# Patient Record
Sex: FEMALE | Race: WHITE | Employment: FULL TIME | URBAN - METROPOLITAN AREA
[De-identification: names, ages, dates, MRNs, and addresses within clinical notes are randomized per-mention and may not be internally consistent; named-entity substitution may affect disease eponyms.]

---

## 2017-01-04 ENCOUNTER — TELEPHONE (OUTPATIENT)
Dept: NURSING | Facility: CLINIC | Age: 27
End: 2017-01-04

## 2017-01-05 NOTE — TELEPHONE ENCOUNTER
Call Type: Triage Call    Presenting Problem: 34 and half weeks.  EDC 2-12-17 .  Lightheaded  and dizzy all the time and doesn't feel good. Has only had about 6  ounces of fluids today and a little soup to eat.  Care advice for  cold management and hydration provided.  Encouraged her to take her  temp and to call back if greater than 100.  Triage Note:  Guideline Title: Upper Respiratory Infection (URI)  Recommended Disposition: Provide Home/Self Care  Original Inclination:  Override Disposition:  Intended Action:  Physician Contacted: No  Pressure/pain above or below eyes, near ears over sinuses (may also be described  as fullness, worsens when bending forward, teeth or eye pain) ?  YES  Ear congestion, pressure, fullness ? NO  Severe breathing problems ? NO  Breathing symptoms (wheezing, shortness of breath, changes in rates of breathing,  skin color changes) main problem ? NO  Sore throat is the symptom causing most concern ? NO  Cough is the symptom causing most concern ? NO  New onset of eye redness, irritation/foreign body sensation or gritty feeling with  yellow/green drainage ? NO  Temperature of 101.5 F (38.6 C) or greater that has not responded to 24 hours of  home care measures ? NO  Localized tender, swollen glands (lymph nodes) that persist or are unimproved  after 14 days ? NO  Symptoms worsen after 7 days or symptoms do not improve after 14 days of home care  ? NO  Recent or recurrent episodes of sneezing, nasal congestion; watery nasal drainage;  scratchy/itchy throat; red/itchy/watery eyes or cough unrelieved after one week  of home care measures ? NO  Sudden onset of flu-like symptoms ? NO  Temperature up to 101.5 F (38.6C) and has not started any home care OR home care  for less than 24 hours ? NO  Pregnant and has a temperature up to 100 F (37.7 C) that has not responded to 3  days of home care ? NO  Being treated by a provider for a secondary infection AND no improvement in  symptoms, symptoms  have worsened OR has new symptoms after following treatment  plan for the time specified by provider. ? NO  Previous call within last week for similar symptoms AND has followed recommended  self care measures for a week but symptoms have not improved or symptoms have  worsened. ? NO  Mild to moderate headache for more than 24 hours unrelieved with 8 hours of  nonprescription medications ? NO  Generalized mild frontal headache unresponsive to 24 hours of home care measures ?  NO  Severe headache unrelieved by 8 hours of nonprescription medication ? NO  New productive cough with thick colored sputum (other than clear or white sputum;  not postnasal drainage) ? NO  Pregnant and has temperature 100F (37.7 C) or greater ? NO  Any temperature elevation in an immunocompromised individual OR frail elderly with  signs of dehydration ? NO  Physician Instructions:  Care Advice:

## 2017-01-09 ENCOUNTER — PRENATAL OFFICE VISIT (OUTPATIENT)
Dept: OBGYN | Facility: CLINIC | Age: 27
End: 2017-01-09
Payer: COMMERCIAL

## 2017-01-09 VITALS
HEIGHT: 62 IN | HEART RATE: 113 BPM | WEIGHT: 155.4 LBS | SYSTOLIC BLOOD PRESSURE: 127 MMHG | BODY MASS INDEX: 28.6 KG/M2 | DIASTOLIC BLOOD PRESSURE: 73 MMHG

## 2017-01-09 DIAGNOSIS — Z34.83 PRENATAL CARE, SUBSEQUENT PREGNANCY, THIRD TRIMESTER: Primary | ICD-10-CM

## 2017-01-09 PROCEDURE — 99207 ZZC PRENATAL VISIT: CPT | Performed by: OBSTETRICS & GYNECOLOGY

## 2017-01-09 PROCEDURE — 87653 STREP B DNA AMP PROBE: CPT | Performed by: OBSTETRICS & GYNECOLOGY

## 2017-01-09 PROCEDURE — 99000 SPECIMEN HANDLING OFFICE-LAB: CPT | Performed by: OBSTETRICS & GYNECOLOGY

## 2017-01-09 RX ORDER — HYDROXYZINE PAMOATE 50 MG/1
50-100 CAPSULE ORAL
Qty: 60 CAPSULE | Refills: 0 | Status: SHIPPED | OUTPATIENT
Start: 2017-01-09 | End: 2017-03-23

## 2017-01-09 NOTE — PROGRESS NOTES
"Doing well.   C/o pelvic pressure, pelvic/hip discomfort. Interferes with her sleeping.   Desires elective induction of labor at 39 weeks  Denies VB, ctx, LOF. +FM  /73 mmHg  Pulse 113  Ht 5' 2\" (1.575 m)  Wt 155 lb 6.4 oz (70.489 kg)  BMI 28.42 kg/m2  LMP 2016 (Approximate)  General Appearance: NAD  Abdomen: Gravid, NT  Refer to flow sheet above.   A/P: 26 year old  at 35w1d  -- GBS collected  -- concerns addressed above; vistaril prescribed for difficulty sleeping from discomfort  -- Discussed with Laura Grewal, the following; indications; the agents and methods of labor augmentation, including risks, benefits, and alternative approaches; and the possible need for  birth. EFW is AGA. The Labor Induction:what you need to know information sheet was made available to her. Questions and concerns were addressed and patient agrees to above if necessary during the course of her labor.  -- labor precautions reviewed  RTC in 1 week    Pawan Crum MD  [facility]            "

## 2017-01-09 NOTE — NURSING NOTE
"Chief Complaint   Patient presents with     Prenatal Care       Initial /73 mmHg  Pulse 113  Ht 5' 2\" (1.575 m)  Wt 155 lb 6.4 oz (70.489 kg)  BMI 28.42 kg/m2  LMP 05/08/2016 (Approximate) Estimated body mass index is 28.42 kg/(m^2) as calculated from the following:    Height as of this encounter: 5' 2\" (1.575 m).    Weight as of this encounter: 155 lb 6.4 oz (70.489 kg).  BP completed using cuff size: ava Woodard LPN      "

## 2017-01-10 LAB
GP B STREP DNA SPEC QL NAA+PROBE: NORMAL
SPECIMEN SOURCE: NORMAL

## 2017-01-12 ENCOUNTER — TELEPHONE (OUTPATIENT)
Dept: OBGYN | Facility: CLINIC | Age: 27
End: 2017-01-12

## 2017-01-12 NOTE — TELEPHONE ENCOUNTER
"Returned call to Patient:  S-(situation): decreased fetal movement, vaginal fluids?    B-(background): Patient is 35+4 week pregnant    A-(assessment): Patient reports lower back, abdominal pain, and pelvic pain with movement. Patient states \"every time I get up to go to the bathroom my underwear/pants are wet.\" Patient reports decreased fetal movement, states has not felt baby move today and normally baby is moving around at 5-6 AM.    R-(recommendations): Advised Patient to be seen in Birth Center to rule out rupture of membranes. Patient reports she plans to eat breakfast first, recommended to have a large glass of juice with breakfast, and lie on left side, pay attention to fetal movement. Patient will follow up in Birth Center after she eats.  Thank you,  Ifeoma Hernandez  OB/GYN, Specialty Clinic RN      "

## 2017-01-12 NOTE — PROGRESS NOTES
Quick Note:    GBS negative status. Will review at next follow-up visit.     Pawan Crum MD  [facility]      ______

## 2017-01-12 NOTE — TELEPHONE ENCOUNTER
Reason for call:  Patient reporting a symptom    Symptom or request: 35 wks pregnant - requesting an u/s at next appt.  States baby just doesn't move much - pt wants to make sure everything is ok.  States she called the birth center also.  Wants to know if an u/s can be done at the next prenatal appt.  Wants a response before appt.    Duration (how long have symptoms been present):     Have you been treated for this before?     Additional comments:     Phone Number patient can be reached at:  Home number on file [phone] (home)    Best Time:  any    Can we leave a detailed message on this number:  YES    Call taken on 1/12/2017 at 8:12 AM by Violetta Arteaga

## 2017-01-16 ENCOUNTER — PRENATAL OFFICE VISIT (OUTPATIENT)
Dept: OBGYN | Facility: CLINIC | Age: 27
End: 2017-01-16
Payer: COMMERCIAL

## 2017-01-16 VITALS
BODY MASS INDEX: 29.19 KG/M2 | SYSTOLIC BLOOD PRESSURE: 120 MMHG | WEIGHT: 158.6 LBS | HEART RATE: 107 BPM | DIASTOLIC BLOOD PRESSURE: 76 MMHG | HEIGHT: 62 IN

## 2017-01-16 DIAGNOSIS — Z34.83 PRENATAL CARE, SUBSEQUENT PREGNANCY, THIRD TRIMESTER: Primary | ICD-10-CM

## 2017-01-16 PROCEDURE — 99207 ZZC PRENATAL VISIT: CPT | Performed by: OBSTETRICS & GYNECOLOGY

## 2017-01-16 NOTE — PROGRESS NOTES
"CC: Here for routine prenatal visit @ 36w1d   HPI: + FM, no ctx, no LOF, no VB.  Achy pelvis.     PE: /76 mmHg  Pulse 107  Ht 5' 2\" (1.575 m)  Wt 158 lb 9.6 oz (71.94 kg)  BMI 29.00 kg/m2  LMP 2016 (Approximate)   See OB flowsheet    GBS negative    A/P  @ 36w1d normal pregnancy    1. Routine prenatal care.  Discussed aches/pains of pregnancy.      RTC 1 week    Mago Chaidez M.D.    "

## 2017-01-16 NOTE — NURSING NOTE
"Chief Complaint   Patient presents with     Prenatal Care     pelvic pain, swelling/pain in hands       Initial /76 mmHg  Pulse 107  Ht 5' 2\" (1.575 m)  Wt 158 lb 9.6 oz (71.94 kg)  BMI 29.00 kg/m2  LMP 05/08/2016 (Approximate) Estimated body mass index is 29 kg/(m^2) as calculated from the following:    Height as of this encounter: 5' 2\" (1.575 m).    Weight as of this encounter: 158 lb 9.6 oz (71.94 kg).  BP completed using cuff size: ava Woodard LPN      "

## 2017-01-18 ENCOUNTER — APPOINTMENT (OUTPATIENT)
Dept: ULTRASOUND IMAGING | Facility: CLINIC | Age: 27
End: 2017-01-18
Attending: OBSTETRICS & GYNECOLOGY
Payer: COMMERCIAL

## 2017-01-18 PROBLEM — O36.8190 DECREASED FETAL MOVEMENT: Status: ACTIVE | Noted: 2017-01-18

## 2017-01-18 PROCEDURE — 76819 FETAL BIOPHYS PROFIL W/O NST: CPT

## 2017-01-19 ENCOUNTER — TELEPHONE (OUTPATIENT)
Dept: OBGYN | Facility: CLINIC | Age: 27
End: 2017-01-19

## 2017-01-19 NOTE — TELEPHONE ENCOUNTER
Patient is currently able to work.  Please inquire if she feels she needs restrictions.     Mago Chaidez M.D.

## 2017-01-19 NOTE — TELEPHONE ENCOUNTER
Reason for call:  Patient reporting a symptom    Symptom or request: Has paperwork - needs Dr. Chaidez to fill out asap.  States she can't work until it is filled out.  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  Pregnant - almost 37 wks.  States employer just gave her this form and told her she couldn't work until it is completed.  Will be bringing it in to Dr. Chaidez to complete.    Additional comments: Will forward to Dr. Chaidez to advise - can pt work?  Any restrictions?  Next clinic appt is scheduled 1-24-17.    Phone Number patient can be reached at:  Home number on file [phone] (home)    Best Time:  any    Can we leave a detailed message on this number:  NO    Call taken on 1/19/2017 at 11:29 AM by Violetta Arteaga

## 2017-01-23 NOTE — TELEPHONE ENCOUNTER
Pt has not brought forms in yet.  Pt has an appt tomorrow -will close encounter for now.  Pt can bring her request to her appt.    -Violetta Arteaga  Clinic Station [place]

## 2017-01-24 ENCOUNTER — PRENATAL OFFICE VISIT (OUTPATIENT)
Dept: OBGYN | Facility: CLINIC | Age: 27
End: 2017-01-24
Payer: COMMERCIAL

## 2017-01-24 VITALS
DIASTOLIC BLOOD PRESSURE: 79 MMHG | HEART RATE: 106 BPM | HEIGHT: 62 IN | SYSTOLIC BLOOD PRESSURE: 122 MMHG | BODY MASS INDEX: 29.77 KG/M2 | WEIGHT: 161.8 LBS

## 2017-01-24 DIAGNOSIS — Z34.83 PRENATAL CARE, SUBSEQUENT PREGNANCY, THIRD TRIMESTER: Primary | ICD-10-CM

## 2017-01-24 PROBLEM — O36.8190 DECREASED FETAL MOVEMENT: Status: RESOLVED | Noted: 2017-01-18 | Resolved: 2017-01-24

## 2017-01-24 PROCEDURE — 99207 ZZC PRENATAL VISIT: CPT | Performed by: OBSTETRICS & GYNECOLOGY

## 2017-01-24 NOTE — NURSING NOTE
"Chief Complaint   Patient presents with     Prenatal Care       Initial /79 mmHg  Pulse 106  Ht 5' 2\" (1.575 m)  Wt 161 lb 12.8 oz (73.392 kg)  BMI 29.59 kg/m2  LMP 05/08/2016 (Approximate)  Breastfeeding? No Estimated body mass index is 29.59 kg/(m^2) as calculated from the following:    Height as of this encounter: 5' 2\" (1.575 m).    Weight as of this encounter: 161 lb 12.8 oz (73.392 kg).  BP completed using cuff size: ava Chávez CMA      "

## 2017-01-24 NOTE — PROGRESS NOTES
"CC: Here for routine prenatal visit @ 37w2d   HPI: + FM, no ctx, no LOF, no VB.  No complaints.     PE: /79 mmHg  Pulse 106  Ht 5' 2\" (1.575 m)  Wt 161 lb 12.8 oz (73.392 kg)  BMI 29.59 kg/m2  LMP 2016 (Approximate)  Breastfeeding? No   See OB flowsheet    GBS negative    A/P  @ 37w2d normal pregnancy    1. Routine prenatal care.  Syphilis is a sexually transmitted disease that can cause birth defects in the babies of untreated mothers. Every pregnant patient is tested for syphilis early in each pregnancy as part of the routine lab work. The [place] Department of [facility] has seen an increase in the rate of syphilis in [place]. The [facility] now recommends testing for syphilis 3 times during a pregnancy, the new prenatal visit, 28 weeks and when admitted for delivery. Patient declines lab testing for syphilis.    RTC 1 week    Mago Chaidez M.D.    "

## 2017-01-27 ENCOUNTER — TELEPHONE (OUTPATIENT)
Dept: OBGYN | Facility: CLINIC | Age: 27
End: 2017-01-27

## 2017-01-27 NOTE — TELEPHONE ENCOUNTER
Reason for Call:  Form, our goal is to have forms completed with 72 hours, however, some forms may require a visit or additional information.    Type of letter, form or note:  disability    Who is the form from?: Insurance comp    Where did the form come from: Patient or family brought in       What clinic location was the form placed at?: [facility]    Where the form was placed: 's Box filled out and given to Dr. Chaidez to sign    What number is listed as a contact on the form?: [phone]       Additional comments: Need to call pt to see where to send when they are signed.    Call taken on 1/27/2017 at 11:27 AM by Violetta Arteaga

## 2017-01-31 ENCOUNTER — PRENATAL OFFICE VISIT (OUTPATIENT)
Dept: OBGYN | Facility: CLINIC | Age: 27
End: 2017-01-31
Payer: COMMERCIAL

## 2017-01-31 VITALS
HEIGHT: 62 IN | DIASTOLIC BLOOD PRESSURE: 77 MMHG | BODY MASS INDEX: 30.62 KG/M2 | SYSTOLIC BLOOD PRESSURE: 128 MMHG | WEIGHT: 166.4 LBS | HEART RATE: 95 BPM

## 2017-01-31 DIAGNOSIS — Z34.83 PRENATAL CARE, SUBSEQUENT PREGNANCY, THIRD TRIMESTER: Primary | ICD-10-CM

## 2017-01-31 PROCEDURE — 59426 ANTEPARTUM CARE ONLY: CPT | Performed by: OBSTETRICS & GYNECOLOGY

## 2017-01-31 PROCEDURE — 99207 ZZC PRENATAL VISIT: CPT | Performed by: OBSTETRICS & GYNECOLOGY

## 2017-01-31 RX ORDER — ACETAMINOPHEN 325 MG/1
650 TABLET ORAL EVERY 4 HOURS PRN
Status: CANCELLED | OUTPATIENT
Start: 2017-01-31

## 2017-01-31 RX ORDER — ONDANSETRON 2 MG/ML
4 INJECTION INTRAMUSCULAR; INTRAVENOUS EVERY 6 HOURS PRN
Status: CANCELLED | OUTPATIENT
Start: 2017-01-31

## 2017-01-31 RX ORDER — OXYTOCIN 10 [USP'U]/ML
10 INJECTION, SOLUTION INTRAMUSCULAR; INTRAVENOUS
Status: CANCELLED | OUTPATIENT
Start: 2017-01-31

## 2017-01-31 RX ORDER — SODIUM CHLORIDE, SODIUM LACTATE, POTASSIUM CHLORIDE, CALCIUM CHLORIDE 600; 310; 30; 20 MG/100ML; MG/100ML; MG/100ML; MG/100ML
INJECTION, SOLUTION INTRAVENOUS CONTINUOUS
Status: CANCELLED | OUTPATIENT
Start: 2017-01-31

## 2017-01-31 RX ORDER — METHYLERGONOVINE MALEATE 0.2 MG/ML
200 INJECTION INTRAVENOUS
Status: CANCELLED | OUTPATIENT
Start: 2017-01-31

## 2017-01-31 RX ORDER — OXYCODONE AND ACETAMINOPHEN 5; 325 MG/1; MG/1
1 TABLET ORAL
Status: CANCELLED | OUTPATIENT
Start: 2017-01-31

## 2017-01-31 RX ORDER — IBUPROFEN 800 MG/1
800 TABLET, FILM COATED ORAL
Status: CANCELLED | OUTPATIENT
Start: 2017-01-31

## 2017-01-31 RX ORDER — OXYTOCIN/0.9 % SODIUM CHLORIDE 30/500 ML
100-340 PLASTIC BAG, INJECTION (ML) INTRAVENOUS CONTINUOUS PRN
Status: CANCELLED | OUTPATIENT
Start: 2017-01-31

## 2017-01-31 RX ORDER — NALOXONE HYDROCHLORIDE 0.4 MG/ML
.1-.4 INJECTION, SOLUTION INTRAMUSCULAR; INTRAVENOUS; SUBCUTANEOUS
Status: CANCELLED | OUTPATIENT
Start: 2017-01-31

## 2017-01-31 RX ORDER — OXYTOCIN/0.9 % SODIUM CHLORIDE 30/500 ML
1-24 PLASTIC BAG, INJECTION (ML) INTRAVENOUS CONTINUOUS
Status: CANCELLED | OUTPATIENT
Start: 2017-01-31

## 2017-01-31 RX ORDER — CARBOPROST TROMETHAMINE 250 UG/ML
250 INJECTION, SOLUTION INTRAMUSCULAR
Status: CANCELLED | OUTPATIENT
Start: 2017-01-31

## 2017-01-31 RX ORDER — LIDOCAINE 40 MG/G
CREAM TOPICAL
Status: CANCELLED | OUTPATIENT
Start: 2017-01-31

## 2017-01-31 NOTE — PROGRESS NOTES
"CC: Here for routine prenatal visit @ 38w2d   HPI: + FM, no ctx, no LOF, no VB.  No complaints.     PE: /77 mmHg  Pulse 95  Ht 5' 2\" (1.575 m)  Wt 166 lb 6.4 oz (75.479 kg)  BMI 30.43 kg/m2  LMP 2016 (Approximate)  Breastfeeding? No   See OB flowsheet    SVE deferred today per request.    A/P  @ 38w2d normal pregnancy    1. Routine prenatal care.  Elective IOL scheduled for 17    Mago Chaidez M.D.    "

## 2017-01-31 NOTE — NURSING NOTE
"Chief Complaint   Patient presents with     Prenatal Care     hand and feet swelling       Initial /77 mmHg  Pulse 95  Ht 5' 2\" (1.575 m)  Wt 166 lb 6.4 oz (75.479 kg)  BMI 30.43 kg/m2  LMP 05/08/2016 (Approximate)  Breastfeeding? No Estimated body mass index is 30.43 kg/(m^2) as calculated from the following:    Height as of this encounter: 5' 2\" (1.575 m).    Weight as of this encounter: 166 lb 6.4 oz (75.479 kg).  BP completed using cuff size: regular  Caro Chávez CMA      "

## 2017-01-31 NOTE — PROGRESS NOTES
Prenatal Breastfeeding Education Toolkit provided for patient to review, helping her to make an informed decision on a feeding choice for her baby. Questions directed to the provider.  Not planning on breastfeeding.  Caro Chávez, [facility]

## 2017-02-02 NOTE — TELEPHONE ENCOUNTER
Forms were signed and faxed then sent to be scanned and copy at .    Kemi Booker  Clinic Station

## 2017-02-04 ENCOUNTER — TRANSFERRED RECORDS (OUTPATIENT)
Dept: HEALTH INFORMATION MANAGEMENT | Facility: CLINIC | Age: 27
End: 2017-02-04

## 2017-03-09 ENCOUNTER — TELEPHONE (OUTPATIENT)
Dept: OBGYN | Facility: CLINIC | Age: 27
End: 2017-03-09

## 2017-03-09 NOTE — TELEPHONE ENCOUNTER
Pt recently gave birth and has 6 w post partum appointment on 3/23/17.  Pt calling to talk to a RN to discuss her post partum sadness, pt crying I offered to have her talk to a triage RN and she said she wants to wait till tomorrow to talk to the RN here.  Pt only wants to see Dr. Chaidez.    Please call-     Kemi Booker  Clinic Station

## 2017-03-10 ENCOUNTER — CARE COORDINATION (OUTPATIENT)
Dept: CARE COORDINATION | Facility: CLINIC | Age: 27
End: 2017-03-10

## 2017-03-10 RX ORDER — ESCITALOPRAM OXALATE 20 MG/1
TABLET ORAL
Qty: 30 TABLET | Refills: 0 | Status: SHIPPED | OUTPATIENT
Start: 2017-03-10 | End: 2018-01-22

## 2017-03-10 ASSESSMENT — ANXIETY QUESTIONNAIRES
GAD7 TOTAL SCORE: 20
IF YOU CHECKED OFF ANY PROBLEMS ON THIS QUESTIONNAIRE, HOW DIFFICULT HAVE THESE PROBLEMS MADE IT FOR YOU TO DO YOUR WORK, TAKE CARE OF THINGS AT HOME, OR GET ALONG WITH OTHER PEOPLE: VERY DIFFICULT
5. BEING SO RESTLESS THAT IT IS HARD TO SIT STILL: NEARLY EVERY DAY
3. WORRYING TOO MUCH ABOUT DIFFERENT THINGS: NEARLY EVERY DAY
2. NOT BEING ABLE TO STOP OR CONTROL WORRYING: NEARLY EVERY DAY
7. FEELING AFRAID AS IF SOMETHING AWFUL MIGHT HAPPEN: MORE THAN HALF THE DAYS
1. FEELING NERVOUS, ANXIOUS, OR ON EDGE: NEARLY EVERY DAY
6. BECOMING EASILY ANNOYED OR IRRITABLE: NEARLY EVERY DAY

## 2017-03-10 ASSESSMENT — PATIENT HEALTH QUESTIONNAIRE - PHQ9: 5. POOR APPETITE OR OVEREATING: NEARLY EVERY DAY

## 2017-03-10 NOTE — PROGRESS NOTES
Clinic Care Coordination Contact  OUTREACH    Referral Information:  Referral Source: Specialist (OB MD)  Reason for Contact: Post partum depression  Care Conference: No     Universal Utilization:   ED Visits in last year: 7  Hospital visits in last year: 2  Last PCP appointment: 17  Missed Appointments: 0  Concerns: yes  Multiple Providers or Specialists: no    Clinical Concerns:  Current Medical Concerns: None, per pt's report    Current Behavioral Concerns: Pt experiencing post partum depression    Education Provided to patient: SW and pt discussed Pregnancy & Postpartum [place] Crisis Line at [phone] and Bernarda RIGGS.     Clinical Pathway: None    Medication Management:  Pt on new mental health medication from MD Chaidez; pt hopeful it will help her symptoms     Functional Status:  Mobility Status: Independent  Equipment Currently Used at Home: none  Transportation: Pt states she has transportation.    Psychosocial:  Current living arrangement:  I live in a private home with familysuppor  Financial/Insurance: Pt is employed as a PCA / [employer]    Pt reports that she is struggling with post partum depression.  Pt reports she has 3 children- ages 7, 5 & a  and did not experience symptoms with her older children, just with her .  Pt shared she is afraid of speaking to strangers about her feelings, and is worried she will sound dumb to people.  SW and pt discussed options of speaking with a staff member from Pregnancy & Post Partum Support [place] via phone services at [phone] and/or coming in person to meet with LUX Lewis.  Pt stated she will use the phone number should need to, and did allow this writer to set an appointment with Bernarda for 3/15/17 at 10 AM.       Resources and Interventions:  Current Resources: WIC and [facility] appointment      Advanced Care Plans/Directives on file:: No  Referrals Placed: Mental Health with [facility]     Goals:   Goal 1 Statement: I want help for my post  partum depression  Goal 1 Progression Percent: 30%  Goal 1 Progression Date: 03/10/17  Barriers: Pt has high anxiety surrounding speaking to professionals about her PPD  Strengths: Pt brought her concerns to her OB MD, which shows great strength  Patient/Caregiver understanding: Pt verbalized permission for this writer to make an appointment with [facility] and verbalized understanding of calling the PPD Support Line  Frequency of Care Coordination: 1-2x/month, PRN  Upcoming appointment: 03/23/17     Plan: SW to continue to follow as needed.  Pt to attend appointment with Bernarda WOODS on 3/15.  Pt to attend appointment with OB MD on 3/23.    Brittany Treadwell  Social Work Care Coordinator  [facility]  [phone]

## 2017-03-10 NOTE — TELEPHONE ENCOUNTER
"Return call to patient.    S-(situation): feelings of post partum depression, PHQ-9 score today 19, KERRY-7 score today 20. Patient feeling very alone and that she is \" doing everything by myself.\" patient currently caring for her children ages 7, 5 and . Patient reports she has a \" friend that had the same symptoms that I talk to and she had postpartum depression.\" patient reports she does not like to talk to anyone openly about ow she is feeling. Patient thinks she would be comfortable with Dr. Chaidez. Patient reports she is \" too chicken to hurt my self but sometimes I think it would be better if I was not here.\"    B-(background): vag delivery 17 at [facility], delivered baby boy 8#2 oz \"Mahin\", prenatal care by Dr. Chaidez.    A-(assessment): postpartum depression    R-(recommendations): Support, reassurance, therapeutic communication. Pact made with patient that she will call back, use ER or call 911 if needed for feelings of hurting herself or another. Huddled with Dr. Chaidez. Prescription sent for depression, care coordinator referral generated.     Patient reports understanding.  Patient has 6 week PP visit in 2 weeks.    Renuka Peters   Ob/Gyn Clinic  RN      "

## 2017-03-11 ASSESSMENT — PATIENT HEALTH QUESTIONNAIRE - PHQ9: SUM OF ALL RESPONSES TO PHQ QUESTIONS 1-9: 19

## 2017-03-11 ASSESSMENT — ANXIETY QUESTIONNAIRES: GAD7 TOTAL SCORE: 20

## 2017-03-15 ENCOUNTER — TELEPHONE (OUTPATIENT)
Dept: FAMILY MEDICINE | Facility: CLINIC | Age: 27
End: 2017-03-15

## 2017-03-23 ENCOUNTER — TELEPHONE (OUTPATIENT)
Dept: OBGYN | Facility: CLINIC | Age: 27
End: 2017-03-23

## 2017-03-23 ENCOUNTER — PRENATAL OFFICE VISIT (OUTPATIENT)
Dept: OBGYN | Facility: CLINIC | Age: 27
End: 2017-03-23
Payer: COMMERCIAL

## 2017-03-23 VITALS
SYSTOLIC BLOOD PRESSURE: 125 MMHG | WEIGHT: 141.2 LBS | HEIGHT: 62 IN | HEART RATE: 79 BPM | BODY MASS INDEX: 25.98 KG/M2 | DIASTOLIC BLOOD PRESSURE: 68 MMHG | TEMPERATURE: 98.2 F

## 2017-03-23 DIAGNOSIS — J45.20 INTERMITTENT ASTHMA, UNCOMPLICATED: ICD-10-CM

## 2017-03-23 DIAGNOSIS — Z30.430 ENCOUNTER FOR INSERTION OF MIRENA IUD: ICD-10-CM

## 2017-03-23 DIAGNOSIS — F41.1 GAD (GENERALIZED ANXIETY DISORDER): ICD-10-CM

## 2017-03-23 PROCEDURE — 58300 INSERT INTRAUTERINE DEVICE: CPT | Performed by: OBSTETRICS & GYNECOLOGY

## 2017-03-23 RX ORDER — ALBUTEROL SULFATE 90 UG/1
2 AEROSOL, METERED RESPIRATORY (INHALATION) EVERY 6 HOURS PRN
Qty: 3 INHALER | Refills: 1 | Status: SHIPPED | OUTPATIENT
Start: 2017-03-23 | End: 2018-11-12

## 2017-03-23 NOTE — MR AVS SNAPSHOT
"              After Visit Summary   3/23/2017    Laura Huff    MRN: [id number]           Patient Information     Date Of Birth          1990        Visit Information        Provider Department      3/23/2017 11:15 AM Mago Chaidez MD [facility]        Today's Diagnoses     Routine postpartum follow-up    -  1    Intermittent asthma, uncomplicated        KERRY (generalized anxiety disorder)        mirena IUD           Follow-ups after your visit        Who to contact     If you have questions or need follow up information about today's clinic visit or your schedule please contact [facility] directly at [phone].  Normal or non-critical lab and imaging results will be communicated to you by MyChart, letter or phone within 4 business days after the clinic has received the results. If you do not hear from us within 7 days, please contact the clinic through [employer] or phone. If you have a critical or abnormal lab result, we will notify you by phone as soon as possible.  Submit refill requests through [employer] or call your pharmacy and they will forward the refill request to us. Please allow 3 business days for your refill to be completed.          Additional Information About Your Visit        MyChart Information     [employer] gives you secure access to your electronic health record. If you see a primary care provider, you can also send messages to your care team and make appointments. If you have questions, please call your primary care clinic.  If you do not have a primary care provider, please call [phone] and they will assist you.        Care EveryWhere ID     This is your Care EveryWhere ID. This could be used by other organizations to access your [facility] medical records  [id number]        Your Vitals Were     Pulse Temperature Height Last Period Breastfeeding? BMI (Body Mass Index)    79 98.2  F (36.8  C) (Oral) 5' 2\" (1.575 m) 05/08/2016 (Approximate) No 25.83 " kg/m2       Blood Pressure from Last 3 Encounters:   03/23/17 125/68   01/31/17 128/77   01/24/17 122/79    Weight from Last 3 Encounters:   03/23/17 141 lb 3.2 oz (64 kg)   01/31/17 166 lb 6.4 oz (75.5 kg)   01/24/17 161 lb 12.8 oz (73.4 kg)              We Performed the Following     INSERTION INTRAUTERINE DEVICE     LEVONORGESTREL-RELEASING ICS          Today's Medication Changes          These changes are accurate as of: 3/23/17 12:14 PM.  If you have any questions, ask your nurse or doctor.               Start taking these medicines.        Dose/Directions    albuterol 108 (90 BASE) MCG/ACT Inhaler   Commonly known as:  PROAIR HFA/PROVENTIL HFA/VENTOLIN HFA   Used for:  Intermittent asthma, uncomplicated   Started by:  Mago Chaidez MD        Dose:  2 puff   Inhale 2 puffs into the lungs every 6 hours as needed for shortness of breath / dyspnea or wheezing   Quantity:  3 Inhaler   Refills:  1         Stop taking these medicines if you haven't already. Please contact your care team if you have questions.     hydrOXYzine 50 MG capsule   Commonly known as:  VISTARIL   Stopped by:  Mago Chaidez MD           hydrOXYzine 50 MG tablet   Commonly known as:  ATARAX   Stopped by:  Mago Chaidez MD           ondansetron 4 MG ODT tab   Commonly known as:  ZOFRAN ODT   Stopped by:  Mago Chaidez MD           PRENATAL PO   Stopped by:  Mago Chaidez MD           TYLENOL PM EXTRA STRENGTH PO   Stopped by:  Mago Chaidez MD                Where to get your medicines      [id number] - [place], [place] - [address]  [address] [facility] [address]     Phone:  [phone]     albuterol 108 (90 BASE) MCG/ACT Inhaler                Primary Care Provider Office Phone # Fax #    Pratibha Hannah Zaidi [phone]       [facility] [address] [facility] [address]        Thank you!     Thank you for choosing [facility]  for your care. Our goal is always to provide you with excellent care. Hearing back from our patients is one way we can continue to improve our services. Please take a few minutes to complete the written survey that you may receive in the mail after your visit with us. Thank you!             Your Updated Medication List - Protect others around you: Learn how to safely use, store and throw away your medicines at www.disposemymeds.org.          This list is accurate as of: 3/23/17 12:14 PM.  Always use your most recent med list.                   Brand Name Dispense Instructions for use    albuterol 108 (90 BASE) MCG/ACT Inhaler    PROAIR HFA/PROVENTIL HFA/VENTOLIN HFA    3 Inhaler    Inhale 2 puffs into the lungs every 6 hours as needed for shortness of breath / dyspnea or wheezing       escitalopram 20 MG tablet    LEXAPRO    30 tablet    Take 1/2 tablet (10 mg) for 1-2 weeks, then increase to 1 tablet orally daily

## 2017-03-23 NOTE — NURSING NOTE
"Chief Complaint   Patient presents with     Post Partum Exam     IUD placement- still having some bleeding post delivery, would like to discuss ADHD also.       Initial /68 (BP Location: Right arm, Patient Position: Chair, Cuff Size: Adult Regular)  Pulse 79  Temp 98.2  F (36.8  C) (Oral)  Ht 5' 2\" (1.575 m)  Wt 141 lb 3.2 oz (64 kg)  LMP 05/08/2016 (Approximate)  Breastfeeding? No  BMI 25.83 kg/m2 Estimated body mass index is 25.83 kg/(m^2) as calculated from the following:    Height as of this encounter: 5' 2\" (1.575 m).    Weight as of this encounter: 141 lb 3.2 oz (64 kg).  Medication Reconciliation: complete   Caro Chávez, PETRA      "
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
## 2017-03-24 ASSESSMENT — PATIENT HEALTH QUESTIONNAIRE - PHQ9: SUM OF ALL RESPONSES TO PHQ QUESTIONS 1-9: 25

## 2017-04-21 ENCOUNTER — CARE COORDINATION (OUTPATIENT)
Dept: CARE COORDINATION | Facility: CLINIC | Age: 27
End: 2017-04-21

## 2017-04-21 NOTE — LETTER
[place] [facility]ORDIN[facility]  [address] [place] [address]  [phone]      April 21, 2017      Laura BLANKENSHIP [facility]  [address]  [facility] [address]    Dear Laura,  I am the Clinic Care Coordinator that works with your primary care provider's clinic. I recently tried to call and was unable to reach you. Below is a description of what Clinic Care Coordination is and how I can further assist you.     The Clinic Care Coordinator role is a Registered Nurse and/or  who understands the health care system. The goal of Clinic Care Coordination is to help you manage your health and improve access to the [facility] system in the most efficient manner.  The Registered Nurse can assist you in meeting your health care goals by providing education, coordinating services, and strengthening the communication among your providers. The  can assist you with financial, behavioral, psychosocial, and chemical dependency and counseling/psychiatric resources.    Please feel free to keep this letter and contact information to contact me at [phone] with any further questions or concerns that may arise. We at [facility] are focused on providing you with the highest-quality healthcare experience possible and that all starts with you.       Sincerely,     Brittany Puga    Enclosed: I have enclosed a copy of a 24 Hour Access Plan. This has helpful phone numbers for you to call when needed. Please keep this in an easy to access place to use as needed.

## 2017-04-21 NOTE — LETTER
Health Care Home - Access Care Plan    About Me  Patient Name:  Laura Pendleton    YOB: 1990  Age:                            26 year old   Estefani MRN:         [id number] Telephone Information:     Home Phone [phone]   Mobile [phone]       Address:    [address] [facility] [address] Email address:  [email or web address]      Emergency Contact(s)  Name Relationship Lgl Grd Work Phone Home Phone Mobile Phone   1. PIETRO PENDLETON Mother   [phone]   2. TIFFANIE PENDLETON Father   [phone]              Health Maintenance:      My Access Plan  Medical Emergency 911   Questions or concerns during clinic hours Primary Clinic Line, I will call the clinic directly: Primary Clinic: [facility] [phone]   24 Hour Appointment Line [phone] or  [phone])  (toll free)   24 Hour Nurse Line [phone] (toll free)   Questions or concerns outside clinic hours 24 Hour Appointment Line, I will call the after-hours on-call line: [facility] [phone] or [phone]) (toll-free)   Preferred Urgent Care Preferred Urgent Care: Other   Preferred Hospital Preferred Hospital: [place], [place]  [phone]   Preferred Pharmacy [facility] [address] [place] [facility] [place], [place] - [address] [place] [address]     Behavioral Health Crisis Line [facility], [phone] or 911     My Care Team Members  Patient Care Team       Relationship Specialty Notifications Start End    Pratibha Zaidi NP PCP - General Nurse Practitioner - Family  7/15/14     Phone: [phone] Fax: [phone]         [facility] [address] [facility] [address]    Brittany Puga   Admissions 4/21/17     Phone: [phone] Fax: [phone]            My Medical and Care Information  Problem List   Patient Active Problem List   Diagnosis     Temporomandibular joint disorder     Congenital vascular nevus      Psoriasis     CARDIOVASCULAR SCREENING; LDL GOAL LESS THAN 130     GERD (gastroesophageal reflux disease)     Intermittent asthma     Sinus tachycardia     Genital warts     Migraine without status migrainosus, not intractable, unspecified migraine type     KERRY (generalized anxiety disorder)     mirena IUD      Current Medications and Allergies:  See printed Medication Report

## 2017-04-21 NOTE — PROGRESS NOTES
Clinic Care Coordination Contact  [facility]/Voicemail    Referral Source: Specialist (OB MD)  Clinical Data: Care Coordinator Outreach  Outreach attempted x 1.  Left message on voicemail with call back information and requested return call.  Plan: Care Coordinator mailed out care coordination introduction letter on 4-21-17. Care Coordinator will try to reach patient again in 10-15 business days.    Brittany Tuttle  Social Work Care Coordinator  [facility]  [phone]

## 2017-07-05 ENCOUNTER — CARE COORDINATION (OUTPATIENT)
Dept: CARE COORDINATION | Facility: CLINIC | Age: 27
End: 2017-07-05

## 2017-07-05 NOTE — PROGRESS NOTES
Clinic Care Coordination Contact  [facility]/Voicemail    Referral Source: Specialist (OB MD)  Clinical Data: Care Coordinator Outreach  Outreach attempted x 2.  Left message on voicemail with call back information and requested return call.  Plan: Care Coordinator mailed out care coordination introduction letter on 4-21-17. Care Coordinator will do no further outreaches at this time.    Brittany Treadwell  Social Work Care Coordinator  [facility]  [phone]

## 2017-12-20 ENCOUNTER — APPOINTMENT (OUTPATIENT)
Dept: GENERAL RADIOLOGY | Facility: CLINIC | Age: 27
End: 2017-12-20
Attending: NURSE PRACTITIONER

## 2017-12-20 ENCOUNTER — HOSPITAL ENCOUNTER (EMERGENCY)
Facility: CLINIC | Age: 27
Discharge: HOME | End: 2017-12-20
Attending: NURSE PRACTITIONER | Admitting: NURSE PRACTITIONER

## 2017-12-20 VITALS
RESPIRATION RATE: 16 BRPM | SYSTOLIC BLOOD PRESSURE: 128 MMHG | TEMPERATURE: 98.4 F | DIASTOLIC BLOOD PRESSURE: 93 MMHG | HEART RATE: 84 BPM | WEIGHT: 150 LBS | OXYGEN SATURATION: 100 % | BODY MASS INDEX: 27.44 KG/M2

## 2017-12-20 DIAGNOSIS — S00.83XA FACIAL CONTUSION, INITIAL ENCOUNTER: Primary | ICD-10-CM

## 2017-12-20 PROCEDURE — 99214 OFFICE O/P EST MOD 30 MIN: CPT | Mod: 25

## 2017-12-20 PROCEDURE — 25000128 H RX IP 250 OP 636: Performed by: NURSE PRACTITIONER

## 2017-12-20 PROCEDURE — 99213 OFFICE O/P EST LOW 20 MIN: CPT | Performed by: NURSE PRACTITIONER

## 2017-12-20 PROCEDURE — 70150 X-RAY EXAM OF FACIAL BONES: CPT

## 2017-12-20 PROCEDURE — 70110 X-RAY EXAM OF JAW 4/> VIEWS: CPT

## 2017-12-20 PROCEDURE — 96372 THER/PROPH/DIAG INJ SC/IM: CPT

## 2017-12-20 RX ORDER — KETOROLAC TROMETHAMINE 30 MG/ML
60 INJECTION, SOLUTION INTRAMUSCULAR; INTRAVENOUS ONCE
Status: COMPLETED | OUTPATIENT
Start: 2017-12-20 | End: 2017-12-20

## 2017-12-20 RX ORDER — IBUPROFEN 600 MG/1
600 TABLET, FILM COATED ORAL EVERY 6 HOURS PRN
Qty: 30 TABLET | Refills: 0 | Status: SHIPPED | OUTPATIENT
Start: 2017-12-20 | End: 2018-01-22

## 2017-12-20 RX ORDER — HYDROCODONE BITARTRATE AND ACETAMINOPHEN 5; 325 MG/1; MG/1
1-2 TABLET ORAL EVERY 4 HOURS PRN
Qty: 15 TABLET | Refills: 0 | Status: SHIPPED | OUTPATIENT
Start: 2017-12-20 | End: 2018-01-22

## 2017-12-20 RX ADMIN — KETOROLAC TROMETHAMINE 60 MG: 30 INJECTION, SOLUTION INTRAMUSCULAR at 16:40

## 2017-12-20 ASSESSMENT — ENCOUNTER SYMPTOMS
SHORTNESS OF BREATH: 0
DIAPHORESIS: 0
WHEEZING: 0
APPETITE CHANGE: 0
RHINORRHEA: 0
FEVER: 0
NAUSEA: 0
SORE THROAT: 0
VOMITING: 0
FATIGUE: 1
CHILLS: 0
DIARRHEA: 0
CONSTIPATION: 0
ACTIVITY CHANGE: 0

## 2017-12-20 NOTE — ED AVS SNAPSHOT
[facility] Emergency Department    [address] [facility] [address]    Phone:  [phone]    Fax:  [phone]                                       Laura Grewal   MRN: [id number]    Department:  [facility] Emergency Department   Date of Visit:  12/20/2017           After Visit Summary Signature Page     I have received my discharge instructions, and my questions have been answered. I have discussed any challenges I see with this plan with the nurse or doctor.    ..........................................................................................................................................  Patient/Patient Representative Signature      ..........................................................................................................................................  Patient Representative Print Name and Relationship to Patient    ..................................................               ................................................  Date                                            Time    ..........................................................................................................................................  Reviewed by Signature/Title    ...................................................              ..............................................  Date                                                            Time
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
## 2017-12-20 NOTE — DISCHARGE INSTRUCTIONS
The xray was negative for fracture today.  If there is worsening of symptoms please return for re evaluation and possible CT scan of face.  Use ibuprofen 400-600 mg up to 4 times per day if needed for pain. Stop if it is causing nausea or abdominal pain.   Add Norco 5-325 (hydrocodone-acetaminophen) 1-2 pills up to every 4 hours if needed for pain. Do not use alcohol, operate machinery, drive, or climb on ladders for 8 hours after taking Norco. Use docusate (100mg) 2 times a day to prevent constipation while on narcotics.  CT scan was offered today and declined.  Facial Contusion  A contusion is another word for a bruise. It happens when small blood vessels break open and leak blood into the nearby area. A facial contusion can result from a bump, hit, or fall. This may happen during sports or an accident. Symptoms of a contusion often include changes in skin color (bruising), swelling, and pain.   The swelling from the contusion should decrease in a few days. Bruising and pain may take several weeks to go away.   Home care    If you have been prescribed medicines for pain, take them as directed.    To help reduce swelling and pain, wrap a cold pack or bag of frozen peas in a thin towel. Put it on the injured area for up to 20 minutes. Do this a few times a day until the swelling goes down.     If you have scrapes or cuts on your face requiring stiches or other closures, care for them as directed.    For the next 24 hours (or longer if instructed):    Don t drink alcohol, or use sedatives or medicines that make you sleepy.    Don t drive or operate machinery.    Don't do anything strenuous. Don t lift or strain.    Don't return to sports or other activity that could result in another head injury.  Note about concussion  Because the injury was to your head, it is possible that a concussion (mild brain injury) could result. Symptoms of a concussion can show up later. Be alert for signs and symptoms of a concussion.  "Seek emergency medical care if any of these develop over the next hours to days:    Headache    Nausea or vomiting    Dizziness    Sensitivity to light or noise    Unusual sleepiness or grogginess    Trouble falling asleep    Personality changes    Vision changes    Memory loss    Confusion    Trouble walking or clumsiness    Loss of consciousness (even for a short time)    Inability to be awakened    Feeling \"off\" or slow as if in a daze   Follow-up care  Follow up with your healthcare provider, or as directed.  When to seek medical advice  Call your healthcare provider right away if any of these occur:    Swelling or pain that gets worse, not better    New swelling or pain    Warmth or drainage from the swollen area or from cuts or scrapes    Fluid drainage or bleeding from the nose or ears    Fever of 100.4 F (38 C) or higher, or as directed by your healthcare provider  Call 911  Call 911 or get immediate medical care if any of the following occur:     Repeated vomiting    Unusual drowsiness or trouble awakening    Fainting or loss of consciousness    Seizure    Worsening confusion, memory loss, dizziness, headache, behavior, speech, or vision  Date Last Reviewed: 5/1/2017 2000-2017 [employer]. [address] [place] [address]. All rights reserved. This information is not intended as a substitute for professional medical care. Always follow your healthcare professional's instructions.        "

## 2018-01-22 ENCOUNTER — HOSPITAL ENCOUNTER (EMERGENCY)
Facility: CLINIC | Age: 28
Discharge: HOME | End: 2018-01-22
Attending: EMERGENCY MEDICINE | Admitting: EMERGENCY MEDICINE
Payer: COMMERCIAL

## 2018-01-22 ENCOUNTER — ALLIED HEALTH/NURSE VISIT (OUTPATIENT)
Dept: FAMILY MEDICINE | Facility: CLINIC | Age: 28
End: 2018-01-22
Payer: COMMERCIAL

## 2018-01-22 VITALS
BODY MASS INDEX: 28.16 KG/M2 | WEIGHT: 153 LBS | SYSTOLIC BLOOD PRESSURE: 120 MMHG | HEART RATE: 90 BPM | HEIGHT: 62 IN | RESPIRATION RATE: 18 BRPM | DIASTOLIC BLOOD PRESSURE: 82 MMHG | TEMPERATURE: 98.2 F | OXYGEN SATURATION: 100 %

## 2018-01-22 VITALS
SYSTOLIC BLOOD PRESSURE: 129 MMHG | TEMPERATURE: 98.3 F | OXYGEN SATURATION: 100 % | DIASTOLIC BLOOD PRESSURE: 88 MMHG | HEART RATE: 89 BPM

## 2018-01-22 DIAGNOSIS — J10.1 INFLUENZA A: Primary | ICD-10-CM

## 2018-01-22 DIAGNOSIS — B34.9 VIRAL ILLNESS: ICD-10-CM

## 2018-01-22 LAB
ALBUMIN UR-MCNC: NEGATIVE MG/DL
ANION GAP SERPL CALCULATED.3IONS-SCNC: 7 MMOL/L (ref 3–14)
APPEARANCE UR: CLEAR
BACTERIA #/AREA URNS HPF: ABNORMAL /HPF
BASOPHILS # BLD AUTO: 0 10E9/L (ref 0–0.2)
BASOPHILS NFR BLD AUTO: 0.2 %
BILIRUB UR QL STRIP: NEGATIVE
BUN SERPL-MCNC: 8 MG/DL (ref 7–30)
CALCIUM SERPL-MCNC: 8.3 MG/DL (ref 8.5–10.1)
CHLORIDE SERPL-SCNC: 106 MMOL/L (ref 94–109)
CO2 SERPL-SCNC: 25 MMOL/L (ref 20–32)
COLOR UR AUTO: YELLOW
CREAT SERPL-MCNC: 0.57 MG/DL (ref 0.52–1.04)
DIFFERENTIAL METHOD BLD: NORMAL
EOSINOPHIL # BLD AUTO: 0.1 10E9/L (ref 0–0.7)
EOSINOPHIL NFR BLD AUTO: 1.3 %
ERYTHROCYTE [DISTWIDTH] IN BLOOD BY AUTOMATED COUNT: 12.8 % (ref 10–15)
GFR SERPL CREATININE-BSD FRML MDRD: >90 ML/MIN/1.7M2
GLUCOSE SERPL-MCNC: 87 MG/DL (ref 70–99)
GLUCOSE UR STRIP-MCNC: NEGATIVE MG/DL
HCG UR QL: NEGATIVE
HCT VFR BLD AUTO: 41.3 % (ref 35–47)
HGB BLD-MCNC: 14 G/DL (ref 11.7–15.7)
HGB UR QL STRIP: NEGATIVE
IMM GRANULOCYTES # BLD: 0 10E9/L (ref 0–0.4)
IMM GRANULOCYTES NFR BLD: 0.2 %
KETONES UR STRIP-MCNC: NEGATIVE MG/DL
LEUKOCYTE ESTERASE UR QL STRIP: ABNORMAL
LYMPHOCYTES # BLD AUTO: 2.6 10E9/L (ref 0.8–5.3)
LYMPHOCYTES NFR BLD AUTO: 30.4 %
MCH RBC QN AUTO: 28.7 PG (ref 26.5–33)
MCHC RBC AUTO-ENTMCNC: 33.9 G/DL (ref 31.5–36.5)
MCV RBC AUTO: 85 FL (ref 78–100)
MONOCYTES # BLD AUTO: 0.6 10E9/L (ref 0–1.3)
MONOCYTES NFR BLD AUTO: 6.5 %
MUCOUS THREADS #/AREA URNS LPF: PRESENT /LPF
NEUTROPHILS # BLD AUTO: 5.2 10E9/L (ref 1.6–8.3)
NEUTROPHILS NFR BLD AUTO: 61.4 %
NITRATE UR QL: NEGATIVE
PH UR STRIP: 5.5 PH (ref 5–7)
PLATELET # BLD AUTO: 270 10E9/L (ref 150–450)
POTASSIUM SERPL-SCNC: 3.6 MMOL/L (ref 3.4–5.3)
RBC # BLD AUTO: 4.88 10E12/L (ref 3.8–5.2)
RBC #/AREA URNS AUTO: 1 /HPF (ref 0–2)
SODIUM SERPL-SCNC: 138 MMOL/L (ref 133–144)
SOURCE: ABNORMAL
SP GR UR STRIP: 1.01 (ref 1–1.03)
SQUAMOUS #/AREA URNS AUTO: 3 /HPF (ref 0–1)
UROBILINOGEN UR STRIP-MCNC: NORMAL MG/DL (ref 0–2)
WBC # BLD AUTO: 8.5 10E9/L (ref 4–11)
WBC #/AREA URNS AUTO: 2 /HPF (ref 0–2)

## 2018-01-22 PROCEDURE — 99207 ZZC NO CHARGE NURSE ONLY: CPT

## 2018-01-22 PROCEDURE — 96360 HYDRATION IV INFUSION INIT: CPT | Performed by: EMERGENCY MEDICINE

## 2018-01-22 PROCEDURE — 85025 COMPLETE CBC W/AUTO DIFF WBC: CPT | Performed by: EMERGENCY MEDICINE

## 2018-01-22 PROCEDURE — 25000128 H RX IP 250 OP 636: Performed by: EMERGENCY MEDICINE

## 2018-01-22 PROCEDURE — 99283 EMERGENCY DEPT VISIT LOW MDM: CPT | Mod: 25 | Performed by: EMERGENCY MEDICINE

## 2018-01-22 PROCEDURE — 80048 BASIC METABOLIC PNL TOTAL CA: CPT | Performed by: EMERGENCY MEDICINE

## 2018-01-22 PROCEDURE — 99282 EMERGENCY DEPT VISIT SF MDM: CPT | Mod: Z6 | Performed by: EMERGENCY MEDICINE

## 2018-01-22 PROCEDURE — 87086 URINE CULTURE/COLONY COUNT: CPT | Performed by: EMERGENCY MEDICINE

## 2018-01-22 PROCEDURE — 81025 URINE PREGNANCY TEST: CPT | Performed by: EMERGENCY MEDICINE

## 2018-01-22 PROCEDURE — 81001 URINALYSIS AUTO W/SCOPE: CPT | Performed by: EMERGENCY MEDICINE

## 2018-01-22 RX ORDER — ONDANSETRON 4 MG/1
8 TABLET, ORALLY DISINTEGRATING ORAL EVERY 8 HOURS PRN
Qty: 10 TABLET | Refills: 0 | Status: SHIPPED | OUTPATIENT
Start: 2018-01-22 | End: 2018-01-25

## 2018-01-22 RX ORDER — CODEINE PHOSPHATE/GUAIFENESIN 10-100MG/5
5-10 LIQUID (ML) ORAL EVERY 4 HOURS PRN
COMMUNITY
Start: 2018-01-16 | End: 2018-11-06

## 2018-01-22 RX ADMIN — SODIUM CHLORIDE 500 ML: 9 INJECTION, SOLUTION INTRAVENOUS at 12:39

## 2018-01-22 ASSESSMENT — ENCOUNTER SYMPTOMS
HEADACHES: 1
LIGHT-HEADEDNESS: 1
APPETITE CHANGE: 1
FREQUENCY: 0
ABDOMINAL PAIN: 0
WEAKNESS: 1
VOMITING: 0
SORE THROAT: 1
DYSURIA: 0
NAUSEA: 0
MYALGIAS: 1

## 2018-01-22 NOTE — NURSING NOTE
"Patient reports flu symptoms last Monday 1-15-18 = HA, stomach ache, diarrhea (dry heaving), body aches, fever, cough.  1-16-18 was seen [facility] in clinic - strep and nasal swab was negative  1-16-18 went to ER at [facility] due to worsening symptoms = temp 103, CXR - normal  Boyfriend tested positive for flu same day.    Patient in clinic due to symptoms persisting:  Coughing - green mucous  Diarrhea persists  Sweats/chills  No appetite  Is drinking however not much - urinated 15 minutes before RN visit.    Lightheadedness - started this morning.  Constant.  Has not eaten much today.  Feels weak - \"feels like I could pass out\".    Patient was wheeled to ER for further evaluation.  Tiff MUNOZ RN      "

## 2018-01-22 NOTE — ED NOTES
Pt here with multiple complaints, including sore throat, body aches,  Headache, and generalized weakness. All symptoms started last Monday.

## 2018-01-22 NOTE — ED AVS SNAPSHOT
" [facility] Emergency Department    [address] [address]    Phone:  [phone]    Fax:  [phone]                                       Laura Grewal   MRN: [id number]    Department:  [facility] Emergency Department   Date of Visit:  1/22/2018           Patient Information     Date Of Birth          1990        Your diagnoses for this visit were:     Viral illness        You were seen by Hemanth Portillo MD.      Follow-up Information     Follow up with Pratibha Zaidi NP.    Specialty:  Nurse Practitioner - Family    Why:  As needed    Contact information:    [address] [facility] [address]  [phone]          Follow up with [facility] Emergency Department.    Specialty:  EMERGENCY MEDICINE    Why:  If symptoms worsen    Contact information:    [address] [place] [id number] [phone]    Additional information:    The medical center is located at   [address] [facility] [address] [facility] ([address]   [facility] [address] [place], [place] miles north   of [place]).        Discharge Instructions       Return if symptoms worsen or new symptoms develop.  Follow-up with primary care physician next available.  Drink plenty of fluids.  If increased fevers not controlled with ibuprofen or Tylenol decreased p.o. intake decreased urine output chest pain shortness of breath or other symptoms present please return for further evaluation and care.  Viral Syndrome (Adult)  A viral illness may cause a number of symptoms. The symptoms depend on the part of the body that the virus affects. If it settles in your nose, throat, and lungs, it may cause cough, sore throat, congestion, and sometimes headache. If it settles in your stomach and intestinal tract, it may cause vomiting and diarrhea. Sometimes it causes vague symptoms like \"aching all over,\" feeling tired, loss of appetite, or fever.  A viral illness usually lasts 1 to 2 weeks, but sometimes it lasts " longer. In some cases, a more serious infection can look like a viral syndrome in the first few days of the illness. You may need another exam and additional tests to know the difference. Watch for the warning signs listed below.  Home care  Follow these guidelines for taking care of yourself at home:    If symptoms are severe, rest at home for the first 2 to 3 days.    Stay away from cigarette smoke - both your smoke and the smoke from others.    You may use over-the-counter acetaminophen or ibuprofen for fever, muscle aching, and headache, unless another medicine was prescribed for this. If you have chronic liver or kidney disease or ever had a stomach ulcer or GI bleeding, talk with your doctor before using these medicines. No one who is younger than 18 and ill with a fever should take aspirin. It may cause severe disease or death.    Your appetite may be poor, so a light diet is fine. Avoid dehydration by drinking 8 to 12 8-ounce glasses of fluids each day. This may include water; orange juice; lemonade; apple, grape, and cranberry juice; clear fruit drinks; electrolyte replacement and sports drinks; and decaffeinated teas and coffee. If you have been diagnosed with a kidney disease, ask your doctor how much and what types of fluids you should drink to prevent dehydration. If you have kidney disease, drinking too much fluid can cause it build up in the your body and be dangerous to your health.    Over-the-counter remedies won't shorten the length of the illness but may be helpful for cough, sore throat; and nasal and sinus congestion. Don't use decongestants if you have high blood pressure.  Follow-up care  Follow up with your healthcare provider if you do not improve over the next week.  Call 911  Get emergency medical care if any of the following occur:    Convulsion    Feeling weak, dizzy, or like you are going to faint    Chest pain, shortness of breath, wheezing, or difficulty breathing  When to seek  medical advice  Call your healthcare provider right away if any of these occur:    Cough with lots of colored sputum (mucus) or blood in your sputum    Chest pain, shortness of breath, wheezing, or difficulty breathing    Severe headache; face, neck, or ear pain    Severe, constant pain in the lower right side of your belly (abdominal)    Continued vomiting (can t keep liquids down)    Frequent diarrhea (more than 5 times a day); blood (red or black color) or mucus in diarrhea    Feeling weak, dizzy, or like you are going to faint    Extreme thirst    Fever of 100.4 F (38 C) or higher, or as directed by your healthcare provider  Date Last Reviewed: 9/25/2015 2000-2017 [employer]. [address] [place] [address], [place] [address]. All rights reserved. This information is not intended as a substitute for professional medical care. Always follow your healthcare professional's instructions.          24 Hour Appointment Hotline       To make an appointment at any [facility], call [phone] ([phone]). If you don't have a family doctor or clinic, we will help you find one. [facility] are conveniently located to serve the needs of you and your family.             Review of your medicines      Our records show that you are taking the medicines listed below. If these are incorrect, please call your family doctor or clinic.        Dose / Directions Last dose taken    albuterol 108 (90 BASE) MCG/ACT Inhaler   Commonly known as:  PROAIR HFA/PROVENTIL HFA/VENTOLIN HFA   Dose:  2 puff   Quantity:  3 Inhaler        Inhale 2 puffs into the lungs every 6 hours as needed for shortness of breath / dyspnea or wheezing   Refills:  1        guaiFENesin-codeine 100-10 MG/5ML Syrp syrup   Commonly known as:  guaiFENesin AC   Dose:  5-10 mL        Take 5-10 mLs by mouth every 4 hours as needed   Refills:  0        IBUPROFEN PO   Dose:  200-400 mg        Take 200-400 mg by mouth every 6 hours as needed for  moderate pain   Refills:  0                Procedures and tests performed during your visit     Basic metabolic panel    CBC with platelets, differential    HCG qualitative urine    UA reflex to Microscopic      Orders Needing Specimen Collection     None      Pending Results     No orders found from 1/20/2018 to 1/23/2018.            Pending Culture Results     No orders found from 1/20/2018 to 1/23/2018.            Pending Results Instructions     If you had any lab results that were not finalized at the time of your Discharge, you can call the ED Lab Result RN at [phone]. You will be contacted by this team for any positive Lab results or changes in treatment. The nurses are available 7 days a week from 10A to 6:30P.  You can leave a message 24 hours per day and they will return your call.        Test Results From Your Hospital Stay        1/22/2018 12:54 PM      Component Results     Component Value Ref Range & Units Status    WBC 8.5 4.0 - 11.0 10e9/L Final    RBC Count 4.88 3.8 - 5.2 10e12/L Final    Hemoglobin 14.0 11.7 - 15.7 g/dL Final    Hematocrit 41.3 35.0 - 47.0 % Final    MCV 85 78 - 100 fl Final    MCH 28.7 26.5 - 33.0 pg Final    MCHC 33.9 31.5 - 36.5 g/dL Final    RDW 12.8 10.0 - 15.0 % Final    Platelet Count 270 150 - 450 10e9/L Final    Diff Method Automated Method  Final    % Neutrophils 61.4 % Final    % Lymphocytes 30.4 % Final    % Monocytes 6.5 % Final    % Eosinophils 1.3 % Final    % Basophils 0.2 % Final    % Immature Granulocytes 0.2 % Final    Absolute Neutrophil 5.2 1.6 - 8.3 10e9/L Final    Absolute Lymphocytes 2.6 0.8 - 5.3 10e9/L Final    Absolute Monocytes 0.6 0.0 - 1.3 10e9/L Final    Absolute Eosinophils 0.1 0.0 - 0.7 10e9/L Final    Absolute Basophils 0.0 0.0 - 0.2 10e9/L Final    Abs Immature Granulocytes 0.0 0 - 0.4 10e9/L Final         1/22/2018  1:09 PM      Component Results     Component Value Ref Range & Units Status    Sodium 138 133 - 144 mmol/L Final    Potassium  3.6 3.4 - 5.3 mmol/L Final    Chloride 106 94 - 109 mmol/L Final    Carbon Dioxide 25 20 - 32 mmol/L Final    Anion Gap 7 3 - 14 mmol/L Final    Glucose 87 70 - 99 mg/dL Final    Urea Nitrogen 8 7 - 30 mg/dL Final    Creatinine 0.57 0.52 - 1.04 mg/dL Final    GFR Estimate >90 >60 mL/min/1.7m2 Final    Non  GFR Calc    GFR Estimate If Black >90 >60 mL/min/1.7m2 Final    African American GFR Calc    Calcium 8.3 (L) 8.5 - 10.1 mg/dL Final         1/22/2018 12:42 PM      Component Results     Component Value Ref Range & Units Status    Color Urine Yellow  Final    Appearance Urine Clear  Final    Glucose Urine Negative NEG^Negative mg/dL Final    Bilirubin Urine Negative NEG^Negative Final    Ketones Urine Negative NEG^Negative mg/dL Final    Specific Gravity Urine 1.011 1.003 - 1.035 Final    Blood Urine Negative NEG^Negative Final    pH Urine 5.5 5.0 - 7.0 pH Final    Protein Albumin Urine Negative NEG^Negative mg/dL Final    Urobilinogen mg/dL Normal 0.0 - 2.0 mg/dL Final    Nitrite Urine Negative NEG^Negative Final    Leukocyte Esterase Urine Small (A) NEG^Negative Final    Source Midstream Urine  Final    RBC Urine 1 0 - 2 /HPF Final    WBC Urine 2 0 - 2 /HPF Final    Bacteria Urine Few (A) NEG^Negative /HPF Final    Squamous Epithelial /HPF Urine 3 (H) 0 - 1 /HPF Final    Mucous Urine Present (A) NEG^Negative /LPF Final         1/22/2018  1:01 PM      Component Results     Component Value Ref Range & Units Status    HCG Qual Urine Negative NEG^Negative Final    This test is for screening purposes.  Results should be interpreted along with   the clinical picture.  Confirmation testing is available if warranted by   ordering [id number], HCG Quantitative Pregnancy.                  Thank you for choosing [facility]       Thank you for choosing [facility] for your care. Our goal is always to provide you with excellent care. Hearing back from our patients is one way we can continue to improve our services.  Please take a few minutes to complete the written survey that you may receive in the mail after you visit with us. Thank you!        [facility] Information     [facility] gives you secure access to your electronic health record. If you see a primary care provider, you can also send messages to your care team and make appointments. If you have questions, please call your primary care clinic.  If you do not have a primary care provider, please call [phone] and they will assist you.        Care EveryWhere ID     This is your Care EveryWhere ID. This could be used by other organizations to access your [facility] medical records  [id number]        Equal Access to Services     MELISA [facility] : Tami Aj, mani moscoso, funmi tyson, amy egan . So [facility] [phone].    ATENCIÓN: Si habla español, tiene a sparks disposición servicios gratuitos de asistencia lingüística. Llame al [phone].    We comply with applicable federal civil rights laws and [place] laws. We do not discriminate on the basis of race, color, national origin, age, disability, sex, sexual orientation, or gender identity.            After Visit Summary       This is your record. Keep this with you and show to your community pharmacist(s) and doctor(s) at your next visit.

## 2018-01-22 NOTE — ED PROVIDER NOTES
History     Chief Complaint   Patient presents with     Generalized Weakness     diagnosed with influenza, doesn't feel improved.      HPI  Laura Grewal is a 27 year old female who presents to the Emergency Department with concerns of generalized weakness. Patient began feeling poorly, with sore throat, body aches, headache and generalized weakness 1 week ago. She was evaluated in the [facility] ED last week and diagnosed with influenza. Chest x-ray was negative. Patient was started on Tamiflu. Patient reports she is not improving, and presented for reevaluation. She is concerned about her decreased appetite and fluid intake over the last week. She is also complaining of lightheadedness and generalized weakness today. She complains of low back pain, and chest pain secondary to coughing. She denies vomiting, abdominal pain, dysuria, urgency or frequency.      Problem List:    Patient Active Problem List    Diagnosis Date Noted     mirena IUD 03/23/2017     Priority: Medium     Mirena IUD placed today by Mago Chaidez MD  LOT# [id number]  Exp:09/19       Migraine without status migrainosus, not intractable, unspecified migraine type 06/09/2016     Priority: Medium     KERRY (generalized anxiety disorder) 06/09/2016     Priority: Medium     Did not tolerate Zoloft.  Currently trying lexapro       Genital warts 12/09/2015     Priority: Medium     Sinus tachycardia 01/14/2015     Priority: Medium     Intermittent asthma 05/27/2014     Priority: Medium     GERD (gastroesophageal reflux disease) 06/10/2013     Priority: Medium     Psoriasis 10/16/2008     Priority: Medium     Has tried clindamycin, triamcinolone, and differin.  On nothing currently.       Congenital vascular nevus 05/24/2007     Priority: Medium     Right forehead       Temporomandibular joint disorder 03/20/2007     Priority: Medium     right side       CARDIOVASCULAR SCREENING; LDL GOAL LESS THAN 130 01/31/2012     Priority: Low        Past Medical  History:    Past Medical History:   Diagnosis Date     Acute sinusitis, unspecified      Acute upper respiratory infections of unspecified site      Chickenpox      Depression with anxiety 5/19/2012     Mild major depression (H) 5/19/2012     Unspecified otitis media        Past Surgical History:    Past Surgical History:   Procedure Laterality Date     MOUTH SURGERY      wisdom teeth       Family History:    Family History   Problem Relation Age of Onset     C.A.D. Maternal Grandmother      Asthma Maternal Grandmother      HEART DISEASE Maternal Grandmother      pacemaker     Cancer - colorectal Paternal Grandmother      colon     CEREBROVASCULAR DISEASE Paternal Grandmother      Asthma Sister      GASTROINTESTINAL DISEASE Sister      celiac     Coronary Artery Disease Paternal Grandfather        Social History:  Marital Status:  Single [1]  Social History   Substance Use Topics     Smoking status: Never Smoker     Smokeless tobacco: Never Used     Alcohol use 0.0 oz/week     0 Standard drinks or equivalent per week      Comment: occas-quit with pregnancy        Medications:      ibuprofen (ADVIL/MOTRIN) 600 MG tablet   HYDROcodone-acetaminophen (NORCO) 5-325 MG per tablet   albuterol (PROAIR HFA/PROVENTIL HFA/VENTOLIN HFA) 108 (90 BASE) MCG/ACT Inhaler   escitalopram (LEXAPRO) 20 MG tablet         Review of Systems   Constitutional: Positive for activity change, appetite change (decreased food and fluid intake), chills, fatigue and unexpected weight change. Negative for fever.   HENT: Positive for congestion and sore throat. Negative for trouble swallowing.    Eyes: Negative for visual disturbance.   Cardiovascular: Negative for chest pain.   Gastrointestinal: Negative for abdominal pain, nausea and vomiting.   Genitourinary: Negative for dysuria, frequency and urgency.   Musculoskeletal: Positive for myalgias.   Skin: Negative for rash.   Allergic/Immunologic: Negative for immunocompromised state.   Neurological:  "Positive for weakness (generalized), light-headedness and headaches.   Psychiatric/Behavioral: Negative for confusion.       Physical Exam   BP: (!) 129/93  Pulse: 90  Temp: 98.2  F (36.8  C)  Resp: 18  Height: 157.5 cm (5' 2\")  Weight: 69.4 kg (153 lb)  SpO2: 98 %      Physical Exam   Constitutional: She appears well-developed and well-nourished. No distress.   HENT:   Head: Normocephalic.   Mouth/Throat: Oropharynx is clear and moist.   TMs clear bilaterally posterior pharynx no erythema edema present.   Eyes: Conjunctivae are normal.   Psychiatric: She has a normal mood and affect.   Nursing note and vitals reviewed.    HENT: Oral mucosa moist. No lesions.   Neck: Supple  Pulmonary/Chest: Lungs are clear to auscultation bilaterally.  Cardiovascular: Heart is regular rate and rhythm. No murmur.  Abdomen: Soft, non-distended, non-tender.   Musculoskeletal: Moving all extremities well. No peripheral edema.   Neurological: Alert. No focal neurologic deficit.   Skin: No rash.    ED Course     ED Course     Procedures               Critical Care time:  none               Labs Ordered and Resulted from Time of ED Arrival Up to the Time of Departure from the ED   BASIC METABOLIC PANEL - Abnormal; Notable for the following:        Result Value    Calcium 8.3 (*)     All other components within normal limits   URINE MACROSCOPIC WITH REFLEX TO MICRO - Abnormal; Notable for the following:     Leukocyte Esterase Urine Small (*)     Bacteria Urine Few (*)     Squamous Epithelial /HPF Urine 3 (*)     Mucous Urine Present (*)     All other components within normal limits   CBC WITH PLATELETS DIFFERENTIAL   HCG QUALITATIVE URINE           Medications   0.9% sodium chloride BOLUS (0 mLs Intravenous Stopped 1/22/18 1354)       12:00 PM Patient assessed.       Assessments & Plan (with Medical Decision Making) records were reviewed.  Patient has had a recent chest x-ray and with clear lung sounds I did not think another chest x-ray " was warranted.  Basic labs were obtained and patient was given a liter of fluids.  White count was not elevated and there was no left shift hemoglobin was 14.0.  Basic metabolic panel without significant abnormality.  UA with small leukocyte esterase otherwise unremarkable and pregnancy test was negative.  Findings were discussed with patient.  I feel this is more likely a viral illness.  She should continue symptomatic treatment with ibuprofen and Tylenol and drink plenty of fluids.  If symptoms worsen or new symptoms develop she should follow-up with her primary or to the emergency department for further assessment and care patient is agreement with this plan.     I have reviewed the nursing notes.    I have reviewed the findings, diagnosis, plan and need for follow up with the patient.       Discharge Medication List as of 1/22/2018  2:11 PM          Final diagnoses:   Viral illness     This document serves as a record of the services and decisions personally performed and made by Hemanth Portillo MD. It was created on his behalf by Sunshine Garcia, a trained medical scribe. The creation of this document is based the provider's statements to the medical scribe.  Sunshine Garcia 12:00 PM 1/22/2018    Provider:   The information in this document, created by the medical scribe for me, accurately reflects the services I personally performed and the decisions made by me. I have reviewed and approved this document for accuracy prior to leaving the patient care area.  Hemanth Portillo MD 12:00 PM 1/22/2018 1/22/2018   [facility] EMERGENCY DEPARTMENT     Hemanth Portillo MD  01/23/18 1749

## 2018-01-22 NOTE — LETTER
January 22, 2018      To Whom It May Concern:      Laura Grewal was seen in our Emergency Department today, 01/22/18.  She should return to work on 1/24/18.    Sincerely,        Hemanth Portillo MD

## 2018-01-22 NOTE — ED NOTES
Seen in [place] ED last week and told she had influenza.  Stated not feeling better.generalized weakness

## 2018-01-22 NOTE — MR AVS SNAPSHOT
After Visit Summary   1/22/2018    Laura Huff    MRN: [id number]           Patient Information     Date Of Birth          1990        Visit Information        Provider Department      1/22/2018 9:30 AM STEPHAN ROME/UMA VALDOVINOS [facility]        Today's Diagnoses     Influenza A    -  1       Follow-ups after your visit        Who to contact     If you have questions or need follow up information about today's clinic visit or your schedule please contact [facility] directly at [phone].  Normal or non-critical lab and imaging results will be communicated to you by Micro[employer], letter or phone within 4 business days after the clinic has received the results. If you do not hear from us within 7 days, please contact the clinic through geoladt or phone. If you have a critical or abnormal lab result, we will notify you by phone as soon as possible.  Submit refill requests through [employer] or call your pharmacy and they will forward the refill request to us. Please allow 3 business days for your refill to be completed.          Additional Information About Your Visit        MyChart Information     [employer] gives you secure access to your electronic health record. If you see a primary care provider, you can also send messages to your care team and make appointments. If you have questions, please call your primary care clinic.  If you do not have a primary care provider, please call [phone] and they will assist you.        Care EveryWhere ID     This is your Care EveryWhere ID. This could be used by other organizations to access your [place] medical records  [id number]        Your Vitals Were     Pulse Temperature Pulse Oximetry             89 98.3  F (36.8  C) 100%          Blood Pressure from Last 3 Encounters:   01/22/18 129/88   12/20/17 (!) 128/93   03/23/17 125/68    Weight from Last 3 Encounters:   12/20/17 150 lb (68 kg)   03/23/17 141 lb 3.2 oz (64 kg)   01/31/17 166  lb 6.4 oz (75.5 kg)              Today, you had the following     No orders found for display       Primary Care Provider Office Phone # Fax #    Pratibha ZaidiTANYA [phone] [address] [facility] [address]        Equal Access to Services     MELISA MI : Hadii aad ku hadasho Soomaali, waaxda luqadaha, qaybta kaalmada adeegyada, waxay idiin hayaan mary khfloresitanimco bryant. So [facility] [phone].    ATENCIÓN: Si habla español, tiene a sparks disposición servicios gratuitos de asistencia lingüística. Llame al [phone].    We comply with applicable federal civil rights laws and [place] laws. We do not discriminate on the basis of race, color, national origin, age, disability, sex, sexual orientation, or gender identity.            Thank you!     Thank you for choosing [facility]  for your care. Our goal is always to provide you with excellent care. Hearing back from our patients is one way we can continue to improve our services. Please take a few minutes to complete the written survey that you may receive in the mail after your visit with us. Thank you!             Your Updated Medication List - Protect others around you: Learn how to safely use, store and throw away your medicines at www.disposemymeds.org.          This list is accurate as of: 1/22/18  9:52 AM.  Always use your most recent med list.                   Brand Name Dispense Instructions for use Diagnosis    albuterol 108 (90 BASE) MCG/ACT Inhaler    PROAIR HFA/PROVENTIL HFA/VENTOLIN HFA    3 Inhaler    Inhale 2 puffs into the lungs every 6 hours as needed for shortness of breath / dyspnea or wheezing    Intermittent asthma, uncomplicated       escitalopram 20 MG tablet    LEXAPRO    30 tablet    Take 1/2 tablet (10 mg) for 1-2 weeks, then increase to 1 tablet orally daily    Postpartum depression       HYDROcodone-acetaminophen 5-325 MG per tablet    NORCO    15 tablet    Take 1-2 tablets by mouth every 4  hours as needed for moderate to severe pain    Facial contusion, initial encounter       ibuprofen 600 MG tablet    ADVIL/MOTRIN    30 tablet    Take 1 tablet (600 mg) by mouth every 6 hours as needed for moderate pain    Facial contusion, initial encounter

## 2018-01-22 NOTE — DISCHARGE INSTRUCTIONS
"Return if symptoms worsen or new symptoms develop.  Follow-up with primary care physician next available.  Drink plenty of fluids.  If increased fevers not controlled with ibuprofen or Tylenol decreased p.o. intake decreased urine output chest pain shortness of breath or other symptoms present please return for further evaluation and care.  Viral Syndrome (Adult)  A viral illness may cause a number of symptoms. The symptoms depend on the part of the body that the virus affects. If it settles in your nose, throat, and lungs, it may cause cough, sore throat, congestion, and sometimes headache. If it settles in your stomach and intestinal tract, it may cause vomiting and diarrhea. Sometimes it causes vague symptoms like \"aching all over,\" feeling tired, loss of appetite, or fever.  A viral illness usually lasts 1 to 2 weeks, but sometimes it lasts longer. In some cases, a more serious infection can look like a viral syndrome in the first few days of the illness. You may need another exam and additional tests to know the difference. Watch for the warning signs listed below.  Home care  Follow these guidelines for taking care of yourself at home:    If symptoms are severe, rest at home for the first 2 to 3 days.    Stay away from cigarette smoke - both your smoke and the smoke from others.    You may use over-the-counter acetaminophen or ibuprofen for fever, muscle aching, and headache, unless another medicine was prescribed for this. If you have chronic liver or kidney disease or ever had a stomach ulcer or GI bleeding, talk with your doctor before using these medicines. No one who is younger than 18 and ill with a fever should take aspirin. It may cause severe disease or death.    Your appetite may be poor, so a light diet is fine. Avoid dehydration by drinking 8 to 12 8-ounce glasses of fluids each day. This may include water; orange juice; lemonade; apple, grape, and cranberry juice; clear fruit drinks; electrolyte " replacement and sports drinks; and decaffeinated teas and coffee. If you have been diagnosed with a kidney disease, ask your doctor how much and what types of fluids you should drink to prevent dehydration. If you have kidney disease, drinking too much fluid can cause it build up in the your body and be dangerous to your health.    Over-the-counter remedies won't shorten the length of the illness but may be helpful for cough, sore throat; and nasal and sinus congestion. Don't use decongestants if you have high blood pressure.  Follow-up care  Follow up with your healthcare provider if you do not improve over the next week.  Call 911  Get emergency medical care if any of the following occur:    Convulsion    Feeling weak, dizzy, or like you are going to faint    Chest pain, shortness of breath, wheezing, or difficulty breathing  When to seek medical advice  Call your healthcare provider right away if any of these occur:    Cough with lots of colored sputum (mucus) or blood in your sputum    Chest pain, shortness of breath, wheezing, or difficulty breathing    Severe headache; face, neck, or ear pain    Severe, constant pain in the lower right side of your belly (abdominal)    Continued vomiting (can t keep liquids down)    Frequent diarrhea (more than 5 times a day); blood (red or black color) or mucus in diarrhea    Feeling weak, dizzy, or like you are going to faint    Extreme thirst    Fever of 100.4 F (38 C) or higher, or as directed by your healthcare provider  Date Last Reviewed: 9/25/2015 2000-2017 [employer]. [address] [place] [address], [place] [address]. All rights reserved. This information is not intended as a substitute for professional medical care. Always follow your healthcare professional's instructions.

## 2018-01-22 NOTE — ED AVS SNAPSHOT
[facility] Emergency Department    [address] [address]    Phone:  [phone]    Fax:  [phone]                                       Laura Grewal   MRN: [id number]    Department:  [facility] Emergency Department   Date of Visit:  1/22/2018           After Visit Summary Signature Page     I have received my discharge instructions, and my questions have been answered. I have discussed any challenges I see with this plan with the nurse or doctor.    ..........................................................................................................................................  Patient/Patient Representative Signature      ..........................................................................................................................................  Patient Representative Print Name and Relationship to Patient    ..................................................               ................................................  Date                                            Time    ..........................................................................................................................................  Reviewed by Signature/Title    ...................................................              ..............................................  Date                                                            Time

## 2018-01-23 LAB
BACTERIA SPEC CULT: NO GROWTH
Lab: NORMAL
SPECIMEN SOURCE: NORMAL

## 2018-01-23 ASSESSMENT — ENCOUNTER SYMPTOMS
UNEXPECTED WEIGHT CHANGE: 1
ACTIVITY CHANGE: 1
CONFUSION: 0
FATIGUE: 1
TROUBLE SWALLOWING: 0
CHILLS: 1
FEVER: 0

## 2018-10-19 ENCOUNTER — HOSPITAL ENCOUNTER (EMERGENCY)
Facility: CLINIC | Age: 28
Discharge: HOME | End: 2018-10-19
Attending: NURSE PRACTITIONER | Admitting: NURSE PRACTITIONER
Payer: OTHER MISCELLANEOUS

## 2018-10-19 VITALS — DIASTOLIC BLOOD PRESSURE: 92 MMHG | SYSTOLIC BLOOD PRESSURE: 142 MMHG | TEMPERATURE: 97.8 F | OXYGEN SATURATION: 99 %

## 2018-10-19 DIAGNOSIS — S46.811A TRAPEZIUS MUSCLE STRAIN, RIGHT, INITIAL ENCOUNTER: Primary | ICD-10-CM

## 2018-10-19 PROCEDURE — 99213 OFFICE O/P EST LOW 20 MIN: CPT | Mod: Z6 | Performed by: NURSE PRACTITIONER

## 2018-10-19 PROCEDURE — G0463 HOSPITAL OUTPT CLINIC VISIT: HCPCS | Performed by: NURSE PRACTITIONER

## 2018-10-19 RX ORDER — TIZANIDINE 2 MG/1
2 TABLET ORAL 3 TIMES DAILY PRN
Qty: 30 TABLET | Refills: 0 | Status: SHIPPED | OUTPATIENT
Start: 2018-10-19 | End: 2019-03-27

## 2018-10-19 NOTE — ED AVS SNAPSHOT
[facility] Emergency Department    [address] [address]    Phone:  [phone]    Fax:  [phone]                                       Laura Grewal   MRN: [id number]    Department:  [facility] Emergency Department   Date of Visit:  10/19/2018           After Visit Summary Signature Page     I have received my discharge instructions, and my questions have been answered. I have discussed any challenges I see with this plan with the nurse or doctor.    ..........................................................................................................................................  Patient/Patient Representative Signature      ..........................................................................................................................................  Patient Representative Print Name and Relationship to Patient    ..................................................               ................................................  Date                                   Time    ..........................................................................................................................................  Reviewed by Signature/Title    ...................................................              ..............................................  Date                                               Time          22EPIC Rev 08/18

## 2018-10-19 NOTE — ED PROVIDER NOTES
History     Chief Complaint   Patient presents with     Neck Pain     HPI  Laura Grewal is a 27 year old female who presents with neck pain.  Symptoms started three weeks ago. 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  As patient admits to having a previous neck injury from a previous.   Pt reports muscle pain in right shoulder and upper back.   Pt feels that her work has aggravated or re-initiated symptoms similar type of employment.  She is wondering if the current symptoms are related to her old injury or if it is a new injury..  Pt reports intermittently tingling in the fingers.  Pt denies loss of muscle control.   Patient denies any other concerns today and reports feeling well otherwise.  Patient denies fever, aches, chills, sweats, loss of bowel or bladder function, loss of arm control or hand control, abdominal pain, dysuria, vomiting, diarrhea, or constipation.    Problem List:    Patient Active Problem List    Diagnosis Date Noted     mirena IUD 03/23/2017     Priority: Medium     Mirena IUD placed today by Mago Chaidez MD  LOT# [id number]  Exp:09/19       Migraine without status migrainosus, not intractable, unspecified migraine type 06/09/2016     Priority: Medium     KERRY (generalized anxiety disorder) 06/09/2016     Priority: Medium     Did not tolerate Zoloft.  Currently trying lexapro       Genital warts 12/09/2015     Priority: Medium     Sinus tachycardia 01/14/2015     Priority: Medium     Intermittent asthma 05/27/2014     Priority: Medium     GERD (gastroesophageal reflux disease) 06/10/2013     Priority: Medium     Psoriasis 10/16/2008     Priority: Medium     Has tried clindamycin, triamcinolone, and differin.  On nothing currently.       Congenital vascular nevus 05/24/2007     Priority: Medium     Right forehead       Temporomandibular joint disorder 03/20/2007     Priority: Medium     right  side       CARDIOVASCULAR SCREENING; LDL GOAL LESS THAN 130 01/31/2012     Priority: Low        Past Medical History:    Past Medical History:   Diagnosis Date     Acute sinusitis, unspecified      Acute upper respiratory infections of unspecified site      Chickenpox      Depression with anxiety 5/19/2012     Mild major depression (H) 5/19/2012     Unspecified otitis media        Past Surgical History:    Past Surgical History:   Procedure Laterality Date     MOUTH SURGERY      wisdom teeth       Family History:    Family History   Problem Relation Age of Onset     C.A.D. Maternal Grandmother      Asthma Maternal Grandmother      HEART DISEASE Maternal Grandmother      pacemaker     Cancer - colorectal Paternal Grandmother      colon     Cerebrovascular Disease Paternal Grandmother      Asthma Sister      GASTROINTESTINAL DISEASE Sister      celiac     Coronary Artery Disease Paternal Grandfather        Social History:  Marital Status:  Single [1]  Social History   Substance Use Topics     Smoking status: Never Smoker     Smokeless tobacco: Never Used     Alcohol use 0.0 oz/week     0 Standard drinks or equivalent per week      Comment: occas-quit with pregnancy        Medications:      tiZANidine (ZANAFLEX) 2 MG tablet   albuterol (PROAIR HFA/PROVENTIL HFA/VENTOLIN HFA) 108 (90 BASE) MCG/ACT Inhaler   guaiFENesin-codeine (GUAIFENESIN AC) 100-10 MG/5ML SYRP syrup   IBUPROFEN PO     Review of Systems  As mentioned above in the history present illness. All other systems were reviewed and are negative.    Physical Exam   BP: (!) 142/92  Heart Rate: 88  Temp: 97.8  F (36.6  C)  SpO2: 99 %      Physical Exam   Constitutional: She appears well-developed and well-nourished. No distress.   Cardiovascular: Normal rate, regular rhythm and normal heart sounds.  Exam reveals no gallop and no friction rub.    No murmur heard.  Pulmonary/Chest: Effort normal and breath sounds normal. No respiratory distress. She has no wheezes.  She has no rales. She exhibits no tenderness.   Musculoskeletal:        Cervical back: She exhibits tenderness (right trapezius muscle spasm noted with pain with palpation), pain and spasm. She exhibits normal range of motion, no bony tenderness, no swelling, no edema, no deformity, no laceration and normal pulse.   Neurological: She is alert.   Skin: Skin is warm and dry. No rash noted. She is not diaphoretic. No erythema. No pallor.   Psychiatric: She has a normal mood and affect.   Nursing note and vitals reviewed.      ED Course     ED Course     Procedures      No results found for this or any previous visit (from the past 24 hour(s)).    Medications - No data to display    Assessments & Plan (with Medical Decision Making)     I have reviewed the nursing notes.    I have reviewed the findings, diagnosis, plan and need for follow up with the patient.  This is a 27-year-old female who comes in for some neck pain.  Patient has a previous history of similar neck pain and it being a work-related injury.  Patient is uncertain if her current pain is a new injury or reinjury from previous employment.  Evaluation reveals trapezius muscle spasm.  Discussed with patient treatment options and recommend follow-up with orthopedic  referral.  In the meantime discussed tizanidine and massage and warm moist packs per patient recommend good body mechanics and get getting plenty of sleep as well.  Patient discharged in stable condition and denies any questions at this point in time.  Discharge Medication List as of 10/19/2018  6:23 PM      START taking these medications    Details   tiZANidine (ZANAFLEX) 2 MG tablet Take 1 tablet (2 mg) by mouth 3 times daily as needed for muscle spasms, Disp-30 tablet, R-0, E-Prescribe             Final diagnoses:   Trapezius muscle strain, right, initial encounter       10/19/2018   [facility] EMERGENCY DEPARTMENT     Enma Lujan, CIELO CNP  10/19/18 6708

## 2018-10-19 NOTE — ED AVS SNAPSHOT
[facility] Emergency Department    [address] [facility] [address]    Phone:  [phone]    Fax:  [phone]                                       Laura Grewal   MRN: [id number]    Department:  [facility] Emergency Department   Date of Visit:  10/19/2018           Patient Information     Date Of Birth          1990        Your diagnoses for this visit were:     Trapezius muscle strain, right, initial encounter        You were seen by Enma Lujan, CIELO CNP.      Follow-up Information     Follow up with Pratibha Zaidi NP.    Specialty:  Nurse Practitioner - Family    Contact information:    [address] [facility] [address]  [phone]        Discharge References/Attachments     BACK AND NECK PAIN, GENERAL (ENGLISH)      24 Hour Appointment Hotline       To make an appointment at any [place] clinic, call [phone] ([phone]). If you don't have a family doctor or clinic, we will help you find one. [place] clinics are conveniently located to serve the needs of you and your family.          ED Discharge Orders     ORTHO  REFERRAL       [facility] is referring you to the Orthopedic  Services at [facility].       The  Representative will assist you in the coordination of your Orthopedic and Musculoskeletal Care as prescribed by your physician.    The  Representative will call you within 1 business day to help schedule your appointment, or you may contact the  Representative at:    All areas ~ [phone]     Type of Referral : Non Surgical / Sport Medicine  Spine: Cervical / Thoracic: Medical Spine/Non-Surgical Specialist        Timeframe requested: Within 2 weeks    Coverage of these services is subject to the terms and limitations of your health insurance plan.  Please call member services at your health plan with any benefit or coverage questions.      If X-rays, CT  or MRI's have been performed, please contact the facility where they were done to arrange for , prior to your scheduled appointment.  Please bring this referral request to your appointment and present it to your specialist.                     Review of your medicines      START taking        Dose / Directions Last dose taken    tiZANidine 2 MG tablet   Commonly known as:  ZANAFLEX   Dose:  2 mg   Quantity:  30 tablet        Take 1 tablet (2 mg) by mouth 3 times daily as needed for muscle spasms   Refills:  0          Our records show that you are taking the medicines listed below. If these are incorrect, please call your family doctor or clinic.        Dose / Directions Last dose taken    albuterol 108 (90 Base) MCG/ACT inhaler   Commonly known as:  PROAIR HFA/PROVENTIL HFA/VENTOLIN HFA   Dose:  2 puff   Quantity:  3 Inhaler        Inhale 2 puffs into the lungs every 6 hours as needed for shortness of breath / dyspnea or wheezing   Refills:  1        guaiFENesin-codeine 100-10 MG/5ML Syrp syrup   Commonly known as:  guaiFENesin AC   Dose:  5-10 mL        Take 5-10 mLs by mouth every 4 hours as needed   Refills:  0        IBUPROFEN PO   Dose:  200-400 mg        Take 200-400 mg by mouth every 6 hours as needed for moderate pain   Refills:  0                Prescriptions were sent or printed at these locations (1 Prescription)                   [facility] [address] [place] [address] [address]   [address] [facility] [address]    Telephone:  [phone]   Fax:  [phone]   Hours:                  E-Prescribed (1 of 1)         tiZANidine (ZANAFLEX) 2 MG tablet                Orders Needing Specimen Collection     None      Pending Results     No orders found from 10/17/2018 to 10/20/2018.            Pending Culture Results     No orders found from 10/17/2018 to 10/20/2018.            Pending Results Instructions     If you had any lab results that were not finalized at the time of your  Discharge, you can call the ED Lab Result RN at [phone]. You will be contacted by this team for any positive Lab results or changes in treatment. The nurses are available 7 days a week from 10A to 6:30P.  You can leave a message 24 hours per day and they will return your call.        Test Results From Your Hospital Stay               Thank you for choosing [facility]       Thank you for choosing [facility] for your care. Our goal is always to provide you with excellent care. Hearing back from our patients is one way we can continue to improve our services. Please take a few minutes to complete the written survey that you may receive in the mail after you visit with us. Thank you!        [employer] Information     [employer] gives you secure access to your electronic health record. If you see a primary care provider, you can also send messages to your care team and make appointments. If you have questions, please call your primary care clinic.  If you do not have a primary care provider, please call [phone] and they will assist you.        Care EveryWhere ID     This is your Care EveryWhere ID. This could be used by other organizations to access your [facility] medical records  [id number]        Equal Access to Services     MELISA MI : Tami Aj, mani moscoso, amy galicia. So [facility] [phone].    ATENCIÓN: Si habla español, tiene a sparks disposición servicios gratuitos de asistencia lingüística. Margarita al [phone].    We comply with applicable federal civil rights laws and [place] laws. We do not discriminate on the basis of race, color, national origin, age, disability, sex, sexual orientation, or gender identity.            After Visit Summary       This is your record. Keep this with you and show to your community pharmacist(s) and doctor(s) at your next visit.

## 2018-10-26 ENCOUNTER — RADIANT APPOINTMENT (OUTPATIENT)
Dept: GENERAL RADIOLOGY | Facility: CLINIC | Age: 28
End: 2018-10-26
Attending: PEDIATRICS
Payer: COMMERCIAL

## 2018-10-26 ENCOUNTER — OFFICE VISIT (OUTPATIENT)
Dept: ORTHOPEDICS | Facility: CLINIC | Age: 28
End: 2018-10-26

## 2018-10-26 VITALS
WEIGHT: 163 LBS | HEIGHT: 63 IN | SYSTOLIC BLOOD PRESSURE: 133 MMHG | DIASTOLIC BLOOD PRESSURE: 89 MMHG | BODY MASS INDEX: 28.88 KG/M2

## 2018-10-26 DIAGNOSIS — M25.511 RIGHT SHOULDER PAIN: ICD-10-CM

## 2018-10-26 DIAGNOSIS — M54.2 NECK PAIN: ICD-10-CM

## 2018-10-26 DIAGNOSIS — M54.2 CERVICALGIA: Primary | ICD-10-CM

## 2018-10-26 DIAGNOSIS — M62.838 TRAPEZIUS MUSCLE SPASM: ICD-10-CM

## 2018-10-26 PROCEDURE — 99204 OFFICE O/P NEW MOD 45 MIN: CPT | Performed by: PEDIATRICS

## 2018-10-26 PROCEDURE — 72040 X-RAY EXAM NECK SPINE 2-3 VW: CPT

## 2018-10-26 RX ORDER — CYCLOBENZAPRINE HCL 5 MG
5 TABLET ORAL 2 TIMES DAILY PRN
Qty: 30 TABLET | Refills: 0 | Status: SHIPPED | OUTPATIENT
Start: 2018-10-26 | End: 2019-03-27

## 2018-10-26 NOTE — MR AVS SNAPSHOT
After Visit Summary   10/26/2018    Laura Huff    MRN: [id number]           Patient Information     Date Of Birth          1990        Visit Information        Provider Department      10/26/2018 2:40 PM Tereza Lopez MD [facility]        Today's Diagnoses     Cervicalgia    -  1    Trapezius muscle spasm          Care Instructions        Plan:  - Today's Plan of Care:  Prescription Medication as directed: Cyclobenzaprine  STOP TIZANIDINE  Rehab: Physical Therapy: [facility] - [phone]  Letter for work provided    -We also discussed other future treatment options:  MRI of the neck or shoulder    Follow Up: 6 - 8 weeks    If you have any further questions for your physician or physician s care team you can call [phone] and use option 3 to leave a voice message. Calls received during business hours will be returned same day.              Follow-ups after your visit        Additional Services     PHYSICAL THERAPY REFERRAL       If you have not heard from the scheduling office within 2 business days, please call [phone] for all locations, with the exception of [place], please call [phone] and [facility] [place], please call [phone].    Please be aware that coverage of these services is subject to the terms and limitations of your health insurance plan.  Call member services at your health plan with any benefit or coverage questions.                  Future tests that were ordered for you today     Open Future Orders        Priority Expected Expires Ordered    PHYSICAL THERAPY REFERRAL Routine  10/26/2019 10/26/2018            Who to contact     If you have questions or need follow up information about today's clinic visit or your schedule please contact [facility] directly at [phone].  Normal or non-critical lab and imaging results will be communicated to you by MyChart, letter or phone within 4  "business days after the clinic has received the results. If you do not hear from us within 7 days, please contact the clinic through [employer] or phone. If you have a critical or abnormal lab result, we will notify you by phone as soon as possible.  Submit refill requests through [employer] or call your pharmacy and they will forward the refill request to us. Please allow 3 business days for your refill to be completed.          Additional Information About Your Visit        [employer] Information     [employer] gives you secure access to your electronic health record. If you see a primary care provider, you can also send messages to your care team and make appointments. If you have questions, please call your primary care clinic.  If you do not have a primary care provider, please call [phone] and they will assist you.        Care EveryWhere ID     This is your Care EveryWhere ID. This could be used by other organizations to access your [facility] medical records  [id number]        Your Vitals Were     Height BMI (Body Mass Index)                5' 3\" (1.6 m) 28.87 kg/m2           Blood Pressure from Last 3 Encounters:   10/26/18 133/89   10/19/18 (!) 142/92   01/22/18 120/82    Weight from Last 3 Encounters:   10/26/18 163 lb (73.9 kg)   01/22/18 153 lb (69.4 kg)   12/20/17 150 lb (68 kg)                 Today's Medication Changes          These changes are accurate as of 10/26/18  3:27 PM.  If you have any questions, ask your nurse or doctor.               Start taking these medicines.        Dose/Directions    cyclobenzaprine 5 MG tablet   Commonly known as:  FLEXERIL   Used for:  Cervicalgia   Started by:  Tereza Lopez MD        Dose:  5 mg   Take 1 tablet (5 mg) by mouth 2 times daily as needed for muscle spasms   Quantity:  30 tablet   Refills:  0            Where to get your medicines      These medications were sent to [facility] [address] [place], [place] - [address] [place] [address]  [address] [facility] [place] [address]     Phone:  [phone]     cyclobenzaprine 5 MG tablet                Primary Care Provider Office Phone # Fax #    [facility] [phone] [address] [facility] [address]        Equal Access to Services     MELISA MI : Hadii aad ku hadasho Soomaali, waaxda luqadaha, qaybta kaalmada adeegyada, waxay carleein hayaan adeed robertnimco bryant. So [facility] [phone].    ATENCIÓN: Si habla español, tiene a sparks disposición servicios gratuitos de asistencia lingüística. Llame al [phone].    We comply with applicable federal civil rights laws and [place] laws. We do not discriminate on the basis of race, color, national origin, age, disability, sex, sexual orientation, or gender identity.            Thank you!     Thank you for choosing [facility]  for your care. Our goal is always to provide you with excellent care. Hearing back from our patients is one way we can continue to improve our services. Please take a few minutes to complete the written survey that you may receive in the mail after your visit with us. Thank you!             Your Updated Medication List - Protect others around you: Learn how to safely use, store and throw away your medicines at www.disposemymeds.org.          This list is accurate as of 10/26/18  3:27 PM.  Always use your most recent med list.                   Brand Name Dispense Instructions for use Diagnosis    albuterol 108 (90 Base) MCG/ACT inhaler    PROAIR HFA/PROVENTIL HFA/VENTOLIN HFA    3 Inhaler    Inhale 2 puffs into the lungs every 6 hours as needed for shortness of breath / dyspnea or wheezing    Intermittent asthma, uncomplicated       cyclobenzaprine 5 MG tablet    FLEXERIL    30 tablet    Take 1 tablet (5 mg) by mouth 2 times daily as needed for muscle spasms    Cervicalgia       guaiFENesin-codeine 100-10 MG/5ML Syrp syrup    guaiFENesin AC     Take 5-10 mLs by mouth every 4 hours as needed         IBUPROFEN PO      Take 200-400 mg by mouth every 6 hours as needed for moderate pain        tiZANidine 2 MG tablet    ZANAFLEX    30 tablet    Take 1 tablet (2 mg) by mouth 3 times daily as needed for muscle spasms

## 2018-10-26 NOTE — PATIENT INSTRUCTIONS
Plan:  - Today's Plan of Care:  Prescription Medication as directed: Cyclobenzaprine  STOP TIZANIDINE  [facility]hab[facility] [phone]  Letter for work provided    -We also discussed other future treatment options:  MRI of the neck or shoulder    Follow Up: 6 - 8 weeks    If you have any further questions for your physician or physician s care team you can call [phone] and use option 3 to leave a voice message. Calls received during business hours will be returned same day.
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
## 2018-10-26 NOTE — LETTER
October 26, 2018      Laura BLANKENSHIP [facility]  [address] [address]        To Whom It May Concern,     Laura is under my care for a work related injury.  She should start physical therapy and follow up in 6-8 weeks.      Sincerely,        Tereza Lopez MD

## 2018-11-06 ENCOUNTER — APPOINTMENT (OUTPATIENT)
Dept: CT IMAGING | Facility: CLINIC | Age: 28
End: 2018-11-06
Attending: EMERGENCY MEDICINE
Payer: COMMERCIAL

## 2018-11-06 ENCOUNTER — HOSPITAL ENCOUNTER (EMERGENCY)
Facility: CLINIC | Age: 28
Discharge: HOME | End: 2018-11-06
Attending: NURSE PRACTITIONER | Admitting: EMERGENCY MEDICINE
Payer: COMMERCIAL

## 2018-11-06 VITALS
BODY MASS INDEX: 29.44 KG/M2 | WEIGHT: 160 LBS | DIASTOLIC BLOOD PRESSURE: 81 MMHG | OXYGEN SATURATION: 96 % | RESPIRATION RATE: 18 BRPM | SYSTOLIC BLOOD PRESSURE: 115 MMHG | TEMPERATURE: 99.2 F | HEIGHT: 62 IN | HEART RATE: 113 BPM

## 2018-11-06 DIAGNOSIS — N10 ACUTE PYELONEPHRITIS: ICD-10-CM

## 2018-11-06 LAB
ALBUMIN SERPL-MCNC: 4.1 G/DL (ref 3.4–5)
ALBUMIN UR-MCNC: 100 MG/DL
ALP SERPL-CCNC: 151 U/L (ref 40–150)
ALT SERPL W P-5'-P-CCNC: 53 U/L (ref 0–50)
ANION GAP SERPL CALCULATED.3IONS-SCNC: 5 MMOL/L (ref 3–14)
APPEARANCE UR: ABNORMAL
AST SERPL W P-5'-P-CCNC: 27 U/L (ref 0–45)
BASOPHILS # BLD AUTO: 0 10E9/L (ref 0–0.2)
BASOPHILS NFR BLD AUTO: 0.3 %
BILIRUB SERPL-MCNC: 0.4 MG/DL (ref 0.2–1.3)
BILIRUB UR QL STRIP: NEGATIVE
BUN SERPL-MCNC: 11 MG/DL (ref 7–30)
CALCIUM SERPL-MCNC: 9.1 MG/DL (ref 8.5–10.1)
CHLORIDE SERPL-SCNC: 104 MMOL/L (ref 94–109)
CO2 SERPL-SCNC: 30 MMOL/L (ref 20–32)
COLOR UR AUTO: ABNORMAL
CREAT SERPL-MCNC: 0.67 MG/DL (ref 0.52–1.04)
DIFFERENTIAL METHOD BLD: ABNORMAL
EOSINOPHIL # BLD AUTO: 0.1 10E9/L (ref 0–0.7)
EOSINOPHIL NFR BLD AUTO: 0.9 %
ERYTHROCYTE [DISTWIDTH] IN BLOOD BY AUTOMATED COUNT: 12.6 % (ref 10–15)
GFR SERPL CREATININE-BSD FRML MDRD: >90 ML/MIN/1.7M2
GLUCOSE SERPL-MCNC: 116 MG/DL (ref 70–99)
GLUCOSE UR STRIP-MCNC: NEGATIVE MG/DL
HCG UR QL: NEGATIVE
HCT VFR BLD AUTO: 43 % (ref 35–47)
HGB BLD-MCNC: 14.2 G/DL (ref 11.7–15.7)
HGB UR QL STRIP: ABNORMAL
IMM GRANULOCYTES # BLD: 0.1 10E9/L (ref 0–0.4)
IMM GRANULOCYTES NFR BLD: 0.4 %
KETONES UR STRIP-MCNC: NEGATIVE MG/DL
LEUKOCYTE ESTERASE UR QL STRIP: ABNORMAL
LYMPHOCYTES # BLD AUTO: 2.7 10E9/L (ref 0.8–5.3)
LYMPHOCYTES NFR BLD AUTO: 18 %
MCH RBC QN AUTO: 29.2 PG (ref 26.5–33)
MCHC RBC AUTO-ENTMCNC: 33 G/DL (ref 31.5–36.5)
MCV RBC AUTO: 89 FL (ref 78–100)
MONOCYTES # BLD AUTO: 0.7 10E9/L (ref 0–1.3)
MONOCYTES NFR BLD AUTO: 4.8 %
MUCOUS THREADS #/AREA URNS LPF: PRESENT /LPF
NEUTROPHILS # BLD AUTO: 11.4 10E9/L (ref 1.6–8.3)
NEUTROPHILS NFR BLD AUTO: 75.6 %
NITRATE UR QL: NEGATIVE
NRBC # BLD AUTO: 0 10*3/UL
NRBC BLD AUTO-RTO: 0 /100
PH UR STRIP: 6 PH (ref 5–7)
PLATELET # BLD AUTO: 349 10E9/L (ref 150–450)
POTASSIUM SERPL-SCNC: 3.9 MMOL/L (ref 3.4–5.3)
PROT SERPL-MCNC: 8.5 G/DL (ref 6.8–8.8)
RBC # BLD AUTO: 4.86 10E12/L (ref 3.8–5.2)
RBC #/AREA URNS AUTO: >182 /HPF (ref 0–2)
SODIUM SERPL-SCNC: 139 MMOL/L (ref 133–144)
SOURCE: ABNORMAL
SP GR UR STRIP: 1.01 (ref 1–1.03)
SQUAMOUS #/AREA URNS AUTO: 3 /HPF (ref 0–1)
UROBILINOGEN UR STRIP-MCNC: 0 MG/DL (ref 0–2)
WBC # BLD AUTO: 15.1 10E9/L (ref 4–11)
WBC #/AREA URNS AUTO: >182 /HPF (ref 0–5)

## 2018-11-06 PROCEDURE — 81001 URINALYSIS AUTO W/SCOPE: CPT | Performed by: NURSE PRACTITIONER

## 2018-11-06 PROCEDURE — 99285 EMERGENCY DEPT VISIT HI MDM: CPT | Mod: 25 | Performed by: EMERGENCY MEDICINE

## 2018-11-06 PROCEDURE — 96375 TX/PRO/DX INJ NEW DRUG ADDON: CPT | Performed by: EMERGENCY MEDICINE

## 2018-11-06 PROCEDURE — 80053 COMPREHEN METABOLIC PANEL: CPT | Performed by: EMERGENCY MEDICINE

## 2018-11-06 PROCEDURE — 25000128 H RX IP 250 OP 636: Performed by: EMERGENCY MEDICINE

## 2018-11-06 PROCEDURE — 81025 URINE PREGNANCY TEST: CPT | Performed by: NURSE PRACTITIONER

## 2018-11-06 PROCEDURE — 96365 THER/PROPH/DIAG IV INF INIT: CPT | Performed by: EMERGENCY MEDICINE

## 2018-11-06 PROCEDURE — 74176 CT ABD & PELVIS W/O CONTRAST: CPT

## 2018-11-06 PROCEDURE — 96361 HYDRATE IV INFUSION ADD-ON: CPT | Performed by: EMERGENCY MEDICINE

## 2018-11-06 PROCEDURE — 85025 COMPLETE CBC W/AUTO DIFF WBC: CPT | Performed by: EMERGENCY MEDICINE

## 2018-11-06 PROCEDURE — 99285 EMERGENCY DEPT VISIT HI MDM: CPT | Mod: Z6 | Performed by: EMERGENCY MEDICINE

## 2018-11-06 RX ORDER — ONDANSETRON 2 MG/ML
4 INJECTION INTRAMUSCULAR; INTRAVENOUS EVERY 30 MIN PRN
Status: DISCONTINUED | OUTPATIENT
Start: 2018-11-06 | End: 2018-11-06

## 2018-11-06 RX ORDER — HYDROMORPHONE HYDROCHLORIDE 1 MG/ML
0.5 INJECTION, SOLUTION INTRAMUSCULAR; INTRAVENOUS; SUBCUTANEOUS ONCE
Status: COMPLETED | OUTPATIENT
Start: 2018-11-06 | End: 2018-11-06

## 2018-11-06 RX ORDER — ONDANSETRON 2 MG/ML
4 INJECTION INTRAMUSCULAR; INTRAVENOUS EVERY 30 MIN PRN
Status: DISCONTINUED | OUTPATIENT
Start: 2018-11-06 | End: 2018-11-06 | Stop reason: HOSPADM

## 2018-11-06 RX ORDER — ONDANSETRON 4 MG/1
4 TABLET, ORALLY DISINTEGRATING ORAL EVERY 6 HOURS PRN
Qty: 10 TABLET | Refills: 0 | Status: SHIPPED | OUTPATIENT
Start: 2018-11-06 | End: 2018-11-12

## 2018-11-06 RX ORDER — CIPROFLOXACIN 500 MG/1
500 TABLET, FILM COATED ORAL 2 TIMES DAILY
Qty: 14 TABLET | Refills: 0 | Status: SHIPPED | OUTPATIENT
Start: 2018-11-06 | End: 2019-02-03

## 2018-11-06 RX ORDER — KETOROLAC TROMETHAMINE 15 MG/ML
15 INJECTION, SOLUTION INTRAMUSCULAR; INTRAVENOUS ONCE
Status: COMPLETED | OUTPATIENT
Start: 2018-11-06 | End: 2018-11-06

## 2018-11-06 RX ORDER — HYDROCODONE BITARTRATE AND ACETAMINOPHEN 5; 325 MG/1; MG/1
1 TABLET ORAL EVERY 6 HOURS PRN
Qty: 10 TABLET | Refills: 0 | Status: SHIPPED | OUTPATIENT
Start: 2018-11-06 | End: 2018-11-08

## 2018-11-06 RX ORDER — CEFTRIAXONE SODIUM 1 G/50ML
1 INJECTION, SOLUTION INTRAVENOUS ONCE
Status: COMPLETED | OUTPATIENT
Start: 2018-11-06 | End: 2018-11-06

## 2018-11-06 RX ORDER — SODIUM CHLORIDE 9 MG/ML
1000 INJECTION, SOLUTION INTRAVENOUS CONTINUOUS
Status: DISCONTINUED | OUTPATIENT
Start: 2018-11-06 | End: 2018-11-06 | Stop reason: HOSPADM

## 2018-11-06 RX ORDER — KETOROLAC TROMETHAMINE 30 MG/ML
30 INJECTION, SOLUTION INTRAMUSCULAR; INTRAVENOUS ONCE
Status: DISCONTINUED | OUTPATIENT
Start: 2018-11-06 | End: 2018-11-06

## 2018-11-06 RX ADMIN — CEFTRIAXONE SODIUM 1 G: 1 INJECTION, SOLUTION INTRAVENOUS at 20:04

## 2018-11-06 RX ADMIN — Medication 0.5 MG: at 19:28

## 2018-11-06 RX ADMIN — SODIUM CHLORIDE 1000 ML: 9 INJECTION, SOLUTION INTRAVENOUS at 18:18

## 2018-11-06 RX ADMIN — KETOROLAC TROMETHAMINE 15 MG: 15 INJECTION, SOLUTION INTRAMUSCULAR; INTRAVENOUS at 18:19

## 2018-11-06 RX ADMIN — ONDANSETRON 4 MG: 2 INJECTION INTRAMUSCULAR; INTRAVENOUS at 18:19

## 2018-11-06 ASSESSMENT — ENCOUNTER SYMPTOMS
DIZZINESS: 1
BACK PAIN: 1
FLANK PAIN: 1
HEMATURIA: 1
DIAPHORESIS: 1
CHILLS: 0
DIARRHEA: 0
DYSURIA: 1
VOMITING: 0
ABDOMINAL PAIN: 1
NAUSEA: 1
FREQUENCY: 1
FEVER: 0

## 2018-11-06 NOTE — ED PROVIDER NOTES
"  History     Chief Complaint   Patient presents with     Pelvic Pain     HX of IUD  2 years ago    Patient having  bleeding today  concerns if she has a bad bladder infection or vaginal bleeding      HPI  Laura Grewal is a 27 year old female who presents to urgent care for evaluation of low back pain sudden onset while working today. This was followed by dysuria - severe, \"like sharp jabs and takes her breath away\".  Became diaphoretic and nauseated. Pain continues. Urinary frequency present. Dizzy.  Trying to drink more water. Feeling hot and cold. Denies vaginal pain. Currently has IUD, placed 2 years ago.  No history of frequent UTIs or kidney stones (reports that her mother gets kidney stones).      Problem List:    Patient Active Problem List    Diagnosis Date Noted     mirena IUD 03/23/2017     Priority: Medium     Mirena IUD placed today by Mago Chaidez MD  LOT# [id number]  Exp:09/19       Migraine without status migrainosus, not intractable, unspecified migraine type 06/09/2016     Priority: Medium     KERRY (generalized anxiety disorder) 06/09/2016     Priority: Medium     Did not tolerate Zoloft.  Currently trying lexapro       Genital warts 12/09/2015     Priority: Medium     Sinus tachycardia 01/14/2015     Priority: Medium     Intermittent asthma 05/27/2014     Priority: Medium     GERD (gastroesophageal reflux disease) 06/10/2013     Priority: Medium     Psoriasis 10/16/2008     Priority: Medium     Has tried clindamycin, triamcinolone, and differin.  On nothing currently.       Congenital vascular nevus 05/24/2007     Priority: Medium     Right forehead       Temporomandibular joint disorder 03/20/2007     Priority: Medium     right side       CARDIOVASCULAR SCREENING; LDL GOAL LESS THAN 130 01/31/2012     Priority: Low        Past Medical History:    Past Medical History:   Diagnosis Date     Acute sinusitis, unspecified      Acute upper respiratory infections of unspecified site      Chickenpox      " "Depression with anxiety 5/19/2012     Mild major depression (H) 5/19/2012     Unspecified otitis media        Past Surgical History:    Past Surgical History:   Procedure Laterality Date     MOUTH SURGERY      wisdom teeth       Family History:    Family History   Problem Relation Age of Onset     C.A.D. Maternal Grandmother      Asthma Maternal Grandmother      HEART DISEASE Maternal Grandmother      pacemaker     Cancer - colorectal Paternal Grandmother      colon     Cerebrovascular Disease Paternal Grandmother      Asthma Sister      GASTROINTESTINAL DISEASE Sister      celiac     Coronary Artery Disease Paternal Grandfather        Social History:  Marital Status:  Single [1]  Social History   Substance Use Topics     Smoking status: Never Smoker     Smokeless tobacco: Never Used     Alcohol use 0.0 oz/week     0 Standard drinks or equivalent per week      Comment: occas-quit with pregnancy        Medications:      albuterol (PROAIR HFA/PROVENTIL HFA/VENTOLIN HFA) 108 (90 BASE) MCG/ACT Inhaler   cyclobenzaprine (FLEXERIL) 5 MG tablet   guaiFENesin-codeine (GUAIFENESIN AC) 100-10 MG/5ML SYRP syrup   IBUPROFEN PO   tiZANidine (ZANAFLEX) 2 MG tablet       Review of Systems   Constitutional: Positive for diaphoresis. Negative for chills and fever.   Gastrointestinal: Positive for abdominal pain and nausea. Negative for diarrhea and vomiting.   Genitourinary: Positive for dysuria, flank pain, frequency, hematuria and pelvic pain. Negative for vaginal pain.   Musculoskeletal: Positive for back pain.   Neurological: Positive for dizziness.       Physical Exam   BP: 132/89  Pulse: 113  Temp: 99.2  F (37.3  C)  Resp: 18  Height: 157.5 cm (5' 2\")  Weight: 72.6 kg (160 lb)      Physical Exam    GENERAL APPEARANCE: healthy, alert and no distress  EYES: EOMI, conjunctiva clear  HENT: bilateral ear canals clear, intact, and without inflammation. Right TM normal. Left TM normal. Nose normal.  Oropharynx without ulcers, " erythema or lesions  NECK: supple, nontender, no lymphadenopathy  RESP: lungs clear to auscultation - no rales, rhonchi or wheezes  CV: regular rates and rhythm, normal S1 S2, no murmur noted  ABDOMEN:  soft, Bowel sounds normal. tenderness RLQ. Positive CVA tenderness.No hepatomegaly.      ED Course     ED Course     Procedures               Results for orders placed or performed during the hospital encounter of 11/06/18 (from the past 24 hour(s))   UA with Microscopic   Result Value Ref Range    Color Urine Red     Appearance Urine Cloudy     Glucose Urine Negative NEG^Negative mg/dL    Bilirubin Urine Negative NEG^Negative    Ketones Urine Negative NEG^Negative mg/dL    Specific Gravity Urine 1.012 1.003 - 1.035    Blood Urine Large (A) NEG^Negative    pH Urine 6.0 5.0 - 7.0 pH    Protein Albumin Urine 100 (A) NEG^Negative mg/dL    Urobilinogen mg/dL 0.0 0.0 - 2.0 mg/dL    Nitrite Urine Negative NEG^Negative    Leukocyte Esterase Urine Large (A) NEG^Negative    Source Midstream Urine     WBC Urine >182 (H) 0 - 5 /HPF    RBC Urine >182 (H) 0 - 2 /HPF    Squamous Epithelial /HPF Urine 3 (H) 0 - 1 /HPF    Mucous Urine Present (A) NEG^Negative /LPF       Medications   0.9% sodium chloride BOLUS (not administered)     Followed by   sodium chloride 0.9% infusion (not administered)   ondansetron (ZOFRAN) injection 4 mg (not administered)   ketorolac (TORADOL) injection 30 mg (not administered)       Assessments & Plan (with Medical Decision Making)   27 year old female with relatively sudden onset of low back pain, pelvic pain, and hematuria.  Patient appears distressed, tearful, and in pain.  On exam she is tender in the right lower quadrant and positive for CVA tenderness.  Given her exam findings and history I recommend further management and workup in the emergency department.  Symptoms could be due to pyelonephritis, but have also considered nephrolithiasis.  I discussed patient's history and exam findings with   Antonio Millan and Dr. Devonte Leary, emergency physicians who agree patient should move to the ED for further work-up/management.    I have reviewed the nursing notes.    I have reviewed the findings, diagnosis, plan and need for follow up with the patient.  CIELO Brenner CNP       [facility], CIELO Watts CNP  11/06/18 8030

## 2018-11-06 NOTE — ED AVS SNAPSHOT
" [facility] Emergency Department    [address] [facility] [address]    Phone:  [phone]    Fax:  [phone]                                       Laura Grewal   MRN: [id number]    Department:  [facility] Emergency Department   Date of Visit:  11/6/2018           Patient Information     Date Of Birth          1990        Your diagnoses for this visit were:     Acute pyelonephritis        You were seen by Jacqui Perez APRN CNP and Antonio Millan MD.      Follow-up Information     Follow up with Clinic, [facility].    Contact information:    [address] [facility] [address] [phone]          Discharge Instructions         Kidney Infection (Adult Female)    An infection in one or both kidneys is called \"pyelonephritis.\" It usually happens when bacteria (or rarely, viruses, fungi, or other disease-causing organisms) get into the kidney. The bacteria (or other disease-causing organisms) can enter the kidneys from the bladder or blood traveling from other parts of the body. A kidney infection can become serious. It can cause severe illness, scarring of the kidneys, or kidney failure if not treated properly.  Common causes for this problem include:    Not keeping the genital area clean and dry, which promotes the growth of bacteria    Wiping back to front which drags bacteria from the rectum toward the urinary opening (urethra)    Wearing tight pants or underwear (this lets moisture build up in the genital area, which helps bacteria grow)    Holding urine in for long periods of time    Dehydration  Kidney infections can cause symptoms similar to a bladder infection. Symptoms include:    Pain (or burning) when urinating    Having to urinate more often than usual    Blood in the urine (pink or red)    Abdominal pain or discomfort, usually in the lower abdomen    Pain in the side or back    Pain above the pubic bone    Fever or chills    Vomiting    Loss of " appetite  Treatment is oral antibiotics, or in more severe cases, intramuscular or IV antibiotics. These are started right away and may be changed once urine culture results determine the infecting organisms. Treatment helps prevent a more serious kidney infection.  Medicines  Medicines can help in the treatment of a bladder infection:    Take antibiotics until they are used up, even if you feel better. It is important to finish them to make sure the infection is gone.    Unless another medicine was prescribed, you can use over-the-counter medicines for pain, fever, or discomfort. If you have chronic liver of kidney disease, talk with your healthcare provider before using these medicines. Also talk with your provider if you've ever had a stomach ulcer or gastrointestinal (GI) bleeding, or are taking blood thinners.  Home care  The following are general care guidelines:    Stay home from work or school. Rest in bed until your fever breaks and you are feeling better, or as advised by your healthcare provider.    Drink lots of fluid unless you must restrict fluids for other medical reasons. This will force the medicine into your urinary system and flush the bacteria out of your body. Ask your healthcare provider how much you should drink.    Don't have sex until you have finished all of your medicine and your symptoms are gone.    Don't have caffeine, alcohol, or spicy foods. These foods may irritate the kidneys and bladder.    Don't take bubble baths. Sensitivity to the chemicals in bubble baths can irritate the urethra.    Make sure you wipe from front to back after using the toilet.    Wear loose cloths and cotton underwear.  Prevention  These self-care steps can help prevent future infections:    Drink plenty of fluids to prevent dehydration and flush out the bladder. Do this unless you must restrict fluids for other health reasons, or your healthcare provider told you not to.    Proper cleaning after going to the  bathroom in important. Make sure you wipe from front to back after using the toilet.    Urinate more often. Don't try to hold urine in for a long time.    Don't wear tight-fitting pants and underwear.    Improve your diet to prevent constipation. Eat more fruits, vegetables, and fiber. Eat less junk and fatty foods. Constipation can make a urinary tract infection more likely. Talk with your healthcare provider if you have trouble with bowel movements.    Urinate right after intercourse to flush out the bladder.  Follow-up care  Follow up with your healthcare provider, or as advised. Additional testing may be needed to make sure the infection has cleared. Close follow-up and further testing is very important to find the cause and to prevent future infections.  If a urine culture was done, you will be contacted if your treatment needs to be changed. If directed, you may call to find out the results.  If you had an X-ray, CT scan, or other diagnostic test, you will be notified of any new findings that may affect your care.  Call 911  Call 911 if any of the following occur:    Trouble breathing    Fainting or loss of consciousness    Rapid or very slow heart rate    Weakness, dizziness, or fainting    Difficulty arousing or confusion  When to seek medical advice  Call your healthcare provider right away if any of these occur:    Fever 100.4 F (38 C) or higher, or as directed by your healthcare provider    Not feeling better within 1 to 2 days after starting antibiotics    Any symptom that continues after 3 days of treatment    Increasing pain in the stomach, back, side, or groin area    Repeated vomiting    Not able to take prescribed medicine due to nausea or another reason    Bloody, dark-colored, or foul smelling urine    Trouble urinating or decreased urine output    No urine for 8 hours, no tears when crying, sunken eyes, or dry mouth  Date Last Reviewed: 10/1/2016    4633-3965 [employer] [address]  [place] [address]. All rights reserved. This information is not intended as a substitute for professional medical care. Always follow your healthcare professional's instructions.          Your next 10 appointments already scheduled     Nov 07, 2018  4:00 PM CST   Ortho Eval with Shannon De Leon PT   [facility] ([facility])    [address]  [address] [place] [id number] [phone]              24 Hour Appointment Hotline       To make an appointment at any [facility] clinic, call [phone] ([phone]). If you don't have a family doctor or clinic, we will help you find one. [facility] clinics are conveniently located to serve the needs of you and your family.             Review of your medicines      START taking        Dose / Directions Last dose taken    ciprofloxacin 500 MG tablet   Commonly known as:  CIPRO   Dose:  500 mg   Quantity:  14 tablet        Take 1 tablet (500 mg) by mouth 2 times daily for 7 days   Refills:  0        HYDROcodone-acetaminophen 5-325 MG per tablet   Commonly known as:  NORCO   Dose:  1 tablet   Quantity:  10 tablet        Take 1 tablet by mouth every 6 hours as needed for severe pain   Refills:  0        ondansetron 4 MG ODT tab   Commonly known as:  ZOFRAN ODT   Dose:  4 mg   Quantity:  10 tablet        Take 1 tablet (4 mg) by mouth every 6 hours as needed for nausea   Refills:  0          Our records show that you are taking the medicines listed below. If these are incorrect, please call your family doctor or clinic.        Dose / Directions Last dose taken    albuterol 108 (90 Base) MCG/ACT inhaler   Commonly known as:  PROAIR HFA/PROVENTIL HFA/VENTOLIN HFA   Dose:  2 puff   Quantity:  3 Inhaler        Inhale 2 puffs into the lungs every 6 hours as needed for shortness of breath / dyspnea or wheezing   Refills:  1        cyclobenzaprine 5 MG tablet   Commonly known as:  FLEXERIL   Dose:  5 mg   Quantity:  30  tablet        Take 1 tablet (5 mg) by mouth 2 times daily as needed for muscle spasms   Refills:  0        IBUPROFEN PO   Dose:  200-400 mg        Take 200-400 mg by mouth every 6 hours as needed for moderate pain   Refills:  0        tiZANidine 2 MG tablet   Commonly known as:  ZANAFLEX   Dose:  2 mg   Quantity:  30 tablet        Take 1 tablet (2 mg) by mouth 3 times daily as needed for muscle spasms   Refills:  0                Information about OPIOIDS     PRESCRIPTION OPIOIDS: WHAT YOU NEED TO KNOW   We gave you an opioid (narcotic) pain medicine. It is important to manage your pain, but opioids are not always the best choice. You should first try all the other options your care team gave you. Take this medicine for as short a time (and as few doses) as possible.    Some activities can increase your pain, such as bandage changes or therapy sessions. It may help to take your pain medicine 30 to 60 minutes before these activities. Reduce your stress by getting enough sleep, working on hobbies you enjoy and practicing relaxation or meditation. Talk to your care team about ways to manage your pain beyond prescription opioids.    These medicines have risks:    DO NOT drive when on new or higher doses of pain medicine. These medicines can affect your alertness and reaction times, and you could be arrested for driving under the influence (DUI). If you need to use opioids long-term, talk to your care team about driving.    DO NOT operate heavy machinery    DO NOT do any other dangerous activities while taking these medicines.    DO NOT drink any alcohol while taking these medicines.     If the opioid prescribed includes acetaminophen, DO NOT take with any other medicines that contain acetaminophen. Read all labels carefully. Look for the word  acetaminophen  or  Tylenol.  Ask your pharmacist if you have questions or are unsure.    You can get addicted to pain medicines, especially if you have a history of addiction  (chemical, alcohol or substance dependence). Talk to your care team about ways to reduce this risk.    All opioids tend to cause constipation. Drink plenty of water and eat foods that have a lot of fiber, such as fruits, vegetables, prune juice, apple juice and high-fiber cereal. Take a laxative (Miralax, milk of magnesia, Colace, Senna) if you don t move your bowels at least every other day. Other side effects include upset stomach, sleepiness, dizziness, throwing up, tolerance (needing more of the medicine to have the same effect), physical dependence and slowed breathing.    Store your pills in a secure place, locked if possible. We will not replace any lost or stolen medicine. If you don t finish your medicine, please throw away (dispose) as directed by your pharmacist. The [place] [employer]ution [employer] has more information about safe disposal: https://www.pca.[facility].mn.us/living-green/managing-unwanted-medications        Prescriptions were sent or printed at these locations (3 Prescriptions)                   [facility] [address] [place] [address] [address]    [address] [facility] [address]    Telephone:  [phone]   Fax:  [phone]   Hours:                  Printed at Department/Unit printer (3 of 3)         ciprofloxacin (CIPRO) 500 MG tablet               HYDROcodone-acetaminophen (NORCO) 5-325 MG per tablet               ondansetron (ZOFRAN ODT) 4 MG ODT tab                Procedures and tests performed during your visit     CBC with platelets differential    CT Abdomen Pelvis without Contrast (stone protocol)    Comprehensive metabolic panel    HCG qualitative urine (UPT)    Peripheral IV catheter    UA with Microscopic      Orders Needing Specimen Collection     None      Pending Results     Date and Time Order Name Status Description    11/6/2018 9800 CT Abdomen Pelvis without Contrast (stone protocol) Preliminary             Pending Culture Results     No orders found from  11/4/2018 to 11/7/2018.            Pending Results Instructions     If you had any lab results that were not finalized at the time of your Discharge, you can call the ED Lab Result RN at [phone]. You will be contacted by this team for any positive Lab results or changes in treatment. The nurses are available 7 days a week from 10A to 6:30P.  You can leave a message 24 hours per day and they will return your call.        Test Results From Your Hospital Stay        11/6/2018  5:39 PM      Component Results     Component Value Ref Range & Units Status    Color Urine Red  Final    Appearance Urine Cloudy  Final    Glucose Urine Negative NEG^Negative mg/dL Final    Bilirubin Urine Negative NEG^Negative Final    Ketones Urine Negative NEG^Negative mg/dL Final    Specific Gravity Urine 1.012 1.003 - 1.035 Final    Blood Urine Large (A) NEG^Negative Final    pH Urine 6.0 5.0 - 7.0 pH Final    Protein Albumin Urine 100 (A) NEG^Negative mg/dL Final    Urobilinogen mg/dL 0.0 0.0 - 2.0 mg/dL Final    Nitrite Urine Negative NEG^Negative Final    Leukocyte Esterase Urine Large (A) NEG^Negative Final    Source Midstream Urine  Final    WBC Urine >182 (H) 0 - 5 /HPF Final    RBC Urine >182 (H) 0 - 2 /HPF Final    Squamous Epithelial /HPF Urine 3 (H) 0 - 1 /HPF Final    Mucous Urine Present (A) NEG^Negative /LPF Final         11/6/2018  6:31 PM      Component Results     Component Value Ref Range & Units Status    HCG Qual Urine Negative NEG^Negative Final    This test is for screening purposes.  Results should be interpreted along with   the clinical picture.  Confirmation testing is available if warranted by   ordering [id number], HCG Quantitative Pregnancy.           11/6/2018  6:14 PM      Component Results     Component Value Ref Range & Units Status    WBC 15.1 (H) 4.0 - 11.0 10e9/L Final    RBC Count 4.86 3.8 - 5.2 10e12/L Final    Hemoglobin 14.2 11.7 - 15.7 g/dL Final    Hematocrit 43.0 35.0 - 47.0 % Final    MCV 89 78 -  100 fl Final    MCH 29.2 26.5 - 33.0 pg Final    MCHC 33.0 31.5 - 36.5 g/dL Final    RDW 12.6 10.0 - 15.0 % Final    Platelet Count 349 150 - 450 10e9/L Final    Diff Method Automated Method  Final    % Neutrophils 75.6 % Final    % Lymphocytes 18.0 % Final    % Monocytes 4.8 % Final    % Eosinophils 0.9 % Final    % Basophils 0.3 % Final    % Immature Granulocytes 0.4 % Final    Nucleated RBCs 0 0 /100 Final    Absolute Neutrophil 11.4 (H) 1.6 - 8.3 10e9/L Final    Absolute Lymphocytes 2.7 0.8 - 5.3 10e9/L Final    Absolute Monocytes 0.7 0.0 - 1.3 10e9/L Final    Absolute Eosinophils 0.1 0.0 - 0.7 10e9/L Final    Absolute Basophils 0.0 0.0 - 0.2 10e9/L Final    Abs Immature Granulocytes 0.1 0 - 0.4 10e9/L Final    Absolute Nucleated RBC 0.0  Final         11/6/2018  6:28 PM      Component Results     Component Value Ref Range & Units Status    Sodium 139 133 - 144 mmol/L Final    Potassium 3.9 3.4 - 5.3 mmol/L Final    Chloride 104 94 - 109 mmol/L Final    Carbon Dioxide 30 20 - 32 mmol/L Final    Anion Gap 5 3 - 14 mmol/L Final    Glucose 116 (H) 70 - 99 mg/dL Final    Urea Nitrogen 11 7 - 30 mg/dL Final    Creatinine 0.67 0.52 - 1.04 mg/dL Final    GFR Estimate >90 >60 mL/min/1.7m2 Final    Non  GFR Calc    GFR Estimate If Black >90 >60 mL/min/1.7m2 Final    African American GFR Calc    Calcium 9.1 8.5 - 10.1 mg/dL Final    Bilirubin Total 0.4 0.2 - 1.3 mg/dL Final    Albumin 4.1 3.4 - 5.0 g/dL Final    Protein Total 8.5 6.8 - 8.8 g/dL Final    Alkaline Phosphatase 151 (H) 40 - 150 U/L Final    ALT 53 (H) 0 - 50 U/L Final    AST 27 0 - 45 U/L Final         11/6/2018  7:43 PM      Narrative     CT ABDOMEN AND PELVIS WITHOUT LVNSKIXA21/6/2018 7:16 PM     HISTORY: Abdominal or flank pain.     TECHNIQUE: Axial images with reconstructions. No IV contrast.  Radiation dose for this scan was reduced using automated exposure  control, adjustment of the mA and/or kV according to patient size, or  iterative  reconstruction technique.    COMPARISON: 3/14/2008    FINDINGS:  Small nonobstructing left renal stone. No ureter stone or  hydronephrosis. IUD. Unremarkable appendix.        Impression     IMPRESSION: Negative for ureter stone                  Thank you for choosing [facility]       Thank you for choosing [facility] for your care. Our goal is always to provide you with excellent care. Hearing back from our patients is one way we can continue to improve our services. Please take a few minutes to complete the written survey that you may receive in the mail after you visit with us. Thank you!        [employer] Information     [facility] gives you secure access to your electronic health record. If you see a primary care provider, you can also send messages to your care team and make appointments. If you have questions, please call your primary care clinic.  If you do not have a primary care provider, please call [phone] and they will assist you.        Care EveryWhere ID     This is your Care EveryWhere ID. This could be used by other organizations to access your [facility] medical records  [id number]        Equal Access to Services     MELISA MI : Tami Aj, waanamaria moscoso, qadeonta kaalavery tyson, amy egan . So [place] -[phone].    ATENCIÓN: Si habla español, tiene a sparks disposición servicios gratuitos de asistencia lingüística. Llame al [phone].    We comply with applicable federal civil rights laws and [place] laws. We do not discriminate on the basis of race, color, national origin, age, disability, sex, sexual orientation, or gender identity.            After Visit Summary       This is your record. Keep this with you and show to your community pharmacist(s) and doctor(s) at your next visit.

## 2018-11-06 NOTE — ED PROVIDER NOTES
History     Chief Complaint   Patient presents with     Pelvic Pain     HX of IUD  2 years ago    Patient having  bleeding today  concerns if she has a bad bladder infection or vaginal bleeding      HPI  Laura Grewal is a 27 year old female who presents to the Emergency Department from Urgent Care for evaluation of abdominal pain. Sudden onset right lower quadrant abdominal pain began this morning around 8 AM with associated nausea and diaphoresis. Urinary frequency and dysuria present. Small amout of blood noted on tissues this morning after wiping. Pain is severe, and radiates to the right flank. She went to work, but pain progressively worsened throughout the day. This afternoon she noticed blood in the toilet after urinating. She has been dizzy and nauseous without vomiting. She had a normal bowel movement yesterday. She is not menstruating as she has a IUD in place. Patient's mother has a history of kidney stones and pyelonephritis. She is a non smoker. No alcohol or illicit drug use.     Problem List:    Patient Active Problem List    Diagnosis Date Noted     mirena IUD 03/23/2017     Priority: Medium     Mirena IUD placed today by Mago Chaidez MD  LOT# [id number]  Exp:09/19       Migraine without status migrainosus, not intractable, unspecified migraine type 06/09/2016     Priority: Medium     KERRY (generalized anxiety disorder) 06/09/2016     Priority: Medium     Did not tolerate Zoloft.  Currently trying lexapro       Genital warts 12/09/2015     Priority: Medium     Sinus tachycardia 01/14/2015     Priority: Medium     Intermittent asthma 05/27/2014     Priority: Medium     GERD (gastroesophageal reflux disease) 06/10/2013     Priority: Medium     Psoriasis 10/16/2008     Priority: Medium     Has tried clindamycin, triamcinolone, and differin.  On nothing currently.       Congenital vascular nevus 05/24/2007     Priority: Medium     Right forehead       Temporomandibular joint disorder 03/20/2007      "Priority: Medium     right side       CARDIOVASCULAR SCREENING; LDL GOAL LESS THAN 130 01/31/2012     Priority: Low        Past Medical History:    Past Medical History:   Diagnosis Date     Acute sinusitis, unspecified      Acute upper respiratory infections of unspecified site      Chickenpox      Depression with anxiety 5/19/2012     Mild major depression (H) 5/19/2012     Unspecified otitis media        Past Surgical History:    Past Surgical History:   Procedure Laterality Date     MOUTH SURGERY      wisdom teeth       Family History:    Family History   Problem Relation Age of Onset     C.A.D. Maternal Grandmother      Asthma Maternal Grandmother      HEART DISEASE Maternal Grandmother      pacemaker     Cancer - colorectal Paternal Grandmother      colon     Cerebrovascular Disease Paternal Grandmother      Asthma Sister      GASTROINTESTINAL DISEASE Sister      celiac     Coronary Artery Disease Paternal Grandfather        Social History:  Marital Status:  Single [1]  Social History   Substance Use Topics     Smoking status: Never Smoker     Smokeless tobacco: Never Used     Alcohol use 0.0 oz/week     0 Standard drinks or equivalent per week      Comment: occas-quit with pregnancy        Medications:      ciprofloxacin (CIPRO) 500 MG tablet   cyclobenzaprine (FLEXERIL) 5 MG tablet   albuterol (PROAIR HFA/PROVENTIL HFA/VENTOLIN HFA) 108 (90 BASE) MCG/ACT Inhaler   IBUPROFEN PO   oxyCODONE IR (ROXICODONE) 5 MG tablet   phenazopyridine (PYRIDIUM) 200 MG tablet   tiZANidine (ZANAFLEX) 2 MG tablet       Review of Systems  All other systems are reviewed and are negative.    Physical Exam   BP: 132/89  Pulse: 113  Temp: 99.2  F (37.3  C)  Resp: 18  Height: 157.5 cm (5' 2\")  Weight: 72.6 kg (160 lb)  SpO2: 99 %      Physical Exam  Nontoxic appearing no respiratory distress alert and oriented ×3  Head atraumatic normocephalic  Neck supple full active painless range of motion  Lungs clear to auscultation  Heart " regular no murmur  No CVA tenderness  Abdomen soft mild diffuse right-sided tenderness without guarding or rebound bowel sounds positive no masses or HSM  Strength and sensation grossly intact throughout the extremities, gait and station normal  Speech is fluent, good eye contact, thought processes are rational  Lower extremities without swelling, redness or tenderness  Pedal pulses symmetrical and strong    ED Course     ED Course     Procedures          Results for orders placed or performed during the hospital encounter of 11/06/18   CT Abdomen Pelvis without Contrast (stone protocol)    Narrative    CT ABDOMEN AND PELVIS WITHOUT [id number]/6/2018 7:16 PM     HISTORY: Abdominal or flank pain.     TECHNIQUE: Axial images with reconstructions. No IV contrast.  Radiation dose for this scan was reduced using automated exposure  control, adjustment of the mA and/or kV according to patient size, or  iterative reconstruction technique.    COMPARISON: 3/14/2008    FINDINGS:  Small nonobstructing left renal stone. No ureter stone or  hydronephrosis. IUD. Unremarkable appendix.      Impression    IMPRESSION: Negative for ureter stone    MONSE BERRY MD   UA with Microscopic   Result Value Ref Range    Color Urine Red     Appearance Urine Cloudy     Glucose Urine Negative NEG^Negative mg/dL    Bilirubin Urine Negative NEG^Negative    Ketones Urine Negative NEG^Negative mg/dL    Specific Gravity Urine 1.012 1.003 - 1.035    Blood Urine Large (A) NEG^Negative    pH Urine 6.0 5.0 - 7.0 pH    Protein Albumin Urine 100 (A) NEG^Negative mg/dL    Urobilinogen mg/dL 0.0 0.0 - 2.0 mg/dL    Nitrite Urine Negative NEG^Negative    Leukocyte Esterase Urine Large (A) NEG^Negative    Source Midstream Urine     WBC Urine >182 (H) 0 - 5 /HPF    RBC Urine >182 (H) 0 - 2 /HPF    Squamous Epithelial /HPF Urine 3 (H) 0 - 1 /HPF    Mucous Urine Present (A) NEG^Negative /LPF   HCG qualitative urine (UPT)   Result Value Ref Range    HCG Qual Urine  Negative NEG^Negative   CBC with platelets differential   Result Value Ref Range    WBC 15.1 (H) 4.0 - 11.0 10e9/L    RBC Count 4.86 3.8 - 5.2 10e12/L    Hemoglobin 14.2 11.7 - 15.7 g/dL    Hematocrit 43.0 35.0 - 47.0 %    MCV 89 78 - 100 fl    MCH 29.2 26.5 - 33.0 pg    MCHC 33.0 31.5 - 36.5 g/dL    RDW 12.6 10.0 - 15.0 %    Platelet Count 349 150 - 450 10e9/L    Diff Method Automated Method     % Neutrophils 75.6 %    % Lymphocytes 18.0 %    % Monocytes 4.8 %    % Eosinophils 0.9 %    % Basophils 0.3 %    % Immature Granulocytes 0.4 %    Nucleated RBCs 0 0 /100    Absolute Neutrophil 11.4 (H) 1.6 - 8.3 10e9/L    Absolute Lymphocytes 2.7 0.8 - 5.3 10e9/L    Absolute Monocytes 0.7 0.0 - 1.3 10e9/L    Absolute Eosinophils 0.1 0.0 - 0.7 10e9/L    Absolute Basophils 0.0 0.0 - 0.2 10e9/L    Abs Immature Granulocytes 0.1 0 - 0.4 10e9/L    Absolute Nucleated RBC 0.0    Comprehensive metabolic panel   Result Value Ref Range    Sodium 139 133 - 144 mmol/L    Potassium 3.9 3.4 - 5.3 mmol/L    Chloride 104 94 - 109 mmol/L    Carbon Dioxide 30 20 - 32 mmol/L    Anion Gap 5 3 - 14 mmol/L    Glucose 116 (H) 70 - 99 mg/dL    Urea Nitrogen 11 7 - 30 mg/dL    Creatinine 0.67 0.52 - 1.04 mg/dL    GFR Estimate >90 >60 mL/min/1.7m2    GFR Estimate If Black >90 >60 mL/min/1.7m2    Calcium 9.1 8.5 - 10.1 mg/dL    Bilirubin Total 0.4 0.2 - 1.3 mg/dL    Albumin 4.1 3.4 - 5.0 g/dL    Protein Total 8.5 6.8 - 8.8 g/dL    Alkaline Phosphatase 151 (H) 40 - 150 U/L    ALT 53 (H) 0 - 50 U/L    AST 27 0 - 45 U/L            No results found for this or any previous visit (from the past 24 hour(s)).    Medications   0.9% sodium chloride BOLUS (0 mLs Intravenous Stopped 11/6/18 2000)   ketorolac (TORADOL) injection 15 mg (15 mg Intravenous Given 11/6/18 1819)   HYDROmorphone (PF) (DILAUDID) injection 0.5 mg (0.5 mg Intravenous Given 11/6/18 1928)   cefTRIAXone in d5w (ROCEPHIN) intermittent infusion 1 g (0 g Intravenous Stopped 11/6/18 2046)      5:48 PM Patient assessed.       Assessments & Plan (with Medical Decision Making)  27-year-old female presents with dysuria, details per HPI.  CT scan undertaken given pain, family history of kidney stone.  She has left intrarenal stone, but there is no evidence for ureteral stone or hydronephrosis.  Findings are consistent with pyelonephritis.  She is not vomiting not dehydrated no evidence for sepsis.  She is treated with IV fluid bolus, 1 dose Dilaudid, Toradol, Rocephin 1 g, urine culture ordered.  No indication for admission, outpatient treatment appropriate at this time.  Ciprofloxacin 500 twice daily times 7 days, return criteria reviewed discharged in improved stable condition.     I have reviewed the nursing notes.    I have reviewed the findings, diagnosis, plan and need for follow up with the patient.       Discharge Medication List as of 11/6/2018  8:46 PM      START taking these medications    Details   ciprofloxacin (CIPRO) 500 MG tablet Take 1 tablet (500 mg) by mouth 2 times daily for 7 days, Disp-14 tablet, R-0, Local Print      ondansetron (ZOFRAN ODT) 4 MG ODT tab Take 1 tablet (4 mg) by mouth every 6 hours as needed for nausea, Disp-10 tablet, R-0, Local Print      HYDROcodone-acetaminophen (NORCO) 5-325 MG per tablet Take 1 tablet by mouth every 6 hours as needed for severe pain, Disp-10 tablet, R-0, Local Print             Final diagnoses:   Acute pyelonephritis     This document serves as a record of the services and decisions personally performed and made by Antonio Millan MD. It was created on his behalf by Sunshine Garcia, a trained medical scribe. The creation of this document is based the provider's statements to the medical scribe.  Sunshine Garcia 5:48 PM 11/6/2018    Provider:   The information in this document, created by the medical scribe for me, accurately reflects the services I personally performed and the decisions made by me. I have reviewed and approved this  document for accuracy prior to leaving the patient care area.  Antonio Millan MD 5:48 PM 11/6/2018 11/6/2018   [facility] EMERGENCY DEPARTMENT     Antonio Millan MD  11/10/18 8711

## 2018-11-06 NOTE — ED AVS SNAPSHOT
[facility] Emergency Department    [address] [address]    Phone:  [phone]    Fax:  [phone]                                       Laura Grewal   MRN: [id number]    Department:  [facility] Emergency Department   Date of Visit:  11/6/2018           After Visit Summary Signature Page     I have received my discharge instructions, and my questions have been answered. I have discussed any challenges I see with this plan with the nurse or doctor.    ..........................................................................................................................................  Patient/Patient Representative Signature      ..........................................................................................................................................  Patient Representative Print Name and Relationship to Patient    ..................................................               ................................................  Date                                   Time    ..........................................................................................................................................  Reviewed by Signature/Title    ...................................................              ..............................................  Date                                               Time          22EPIC Rev 08/18

## 2018-11-07 NOTE — ED NOTES
Pt here from UC with concern for kidney stone. Pt reports flank pain and urinary frequency that started today along with hematuria. No personal hx of kidney stones but reports that her mother has had stones.

## 2018-11-07 NOTE — DISCHARGE INSTRUCTIONS
"  Kidney Infection (Adult Female)    An infection in one or both kidneys is called \"pyelonephritis.\" It usually happens when bacteria (or rarely, viruses, fungi, or other disease-causing organisms) get into the kidney. The bacteria (or other disease-causing organisms) can enter the kidneys from the bladder or blood traveling from other parts of the body. A kidney infection can become serious. It can cause severe illness, scarring of the kidneys, or kidney failure if not treated properly.  Common causes for this problem include:    Not keeping the genital area clean and dry, which promotes the growth of bacteria    Wiping back to front which drags bacteria from the rectum toward the urinary opening (urethra)    Wearing tight pants or underwear (this lets moisture build up in the genital area, which helps bacteria grow)    Holding urine in for long periods of time    Dehydration  Kidney infections can cause symptoms similar to a bladder infection. Symptoms include:    Pain (or burning) when urinating    Having to urinate more often than usual    Blood in the urine (pink or red)    Abdominal pain or discomfort, usually in the lower abdomen    Pain in the side or back    Pain above the pubic bone    Fever or chills    Vomiting    Loss of appetite  Treatment is oral antibiotics, or in more severe cases, intramuscular or IV antibiotics. These are started right away and may be changed once urine culture results determine the infecting organisms. Treatment helps prevent a more serious kidney infection.  Medicines  Medicines can help in the treatment of a bladder infection:    Take antibiotics until they are used up, even if you feel better. It is important to finish them to make sure the infection is gone.    Unless another medicine was prescribed, you can use over-the-counter medicines for pain, fever, or discomfort. If you have chronic liver of kidney disease, talk with your healthcare provider before using these " medicines. Also talk with your provider if you've ever had a stomach ulcer or gastrointestinal (GI) bleeding, or are taking blood thinners.  Home care  The following are general care guidelines:    Stay home from work or school. Rest in bed until your fever breaks and you are feeling better, or as advised by your healthcare provider.    Drink lots of fluid unless you must restrict fluids for other medical reasons. This will force the medicine into your urinary system and flush the bacteria out of your body. Ask your healthcare provider how much you should drink.    Don't have sex until you have finished all of your medicine and your symptoms are gone.    Don't have caffeine, alcohol, or spicy foods. These foods may irritate the kidneys and bladder.    Don't take bubble baths. Sensitivity to the chemicals in bubble baths can irritate the urethra.    Make sure you wipe from front to back after using the toilet.    Wear loose cloths and cotton underwear.  Prevention  These self-care steps can help prevent future infections:    Drink plenty of fluids to prevent dehydration and flush out the bladder. Do this unless you must restrict fluids for other health reasons, or your healthcare provider told you not to.    Proper cleaning after going to the bathroom in important. Make sure you wipe from front to back after using the toilet.    Urinate more often. Don't try to hold urine in for a long time.    Don't wear tight-fitting pants and underwear.    Improve your diet to prevent constipation. Eat more fruits, vegetables, and fiber. Eat less junk and fatty foods. Constipation can make a urinary tract infection more likely. Talk with your healthcare provider if you have trouble with bowel movements.    Urinate right after intercourse to flush out the bladder.  Follow-up care  Follow up with your healthcare provider, or as advised. Additional testing may be needed to make sure the infection has cleared. Close follow-up and  further testing is very important to find the cause and to prevent future infections.  If a urine culture was done, you will be contacted if your treatment needs to be changed. If directed, you may call to find out the results.  If you had an X-ray, CT scan, or other diagnostic test, you will be notified of any new findings that may affect your care.  Call 911  Call 911 if any of the following occur:    Trouble breathing    Fainting or loss of consciousness    Rapid or very slow heart rate    Weakness, dizziness, or fainting    Difficulty arousing or confusion  When to seek medical advice  Call your healthcare provider right away if any of these occur:    Fever 100.4 F (38 C) or higher, or as directed by your healthcare provider    Not feeling better within 1 to 2 days after starting antibiotics    Any symptom that continues after 3 days of treatment    Increasing pain in the stomach, back, side, or groin area    Repeated vomiting    Not able to take prescribed medicine due to nausea or another reason    Bloody, dark-colored, or foul smelling urine    Trouble urinating or decreased urine output    No urine for 8 hours, no tears when crying, sunken eyes, or dry mouth  Date Last Reviewed: 10/1/2016    [id number] [employer]. [address] [place] [address], [place] [address]. All rights reserved. This information is not intended as a substitute for professional medical care. Always follow your healthcare professional's instructions.

## 2018-11-08 ENCOUNTER — TELEPHONE (OUTPATIENT)
Dept: ORTHOPEDICS | Facility: CLINIC | Age: 28
End: 2018-11-08

## 2018-11-08 ENCOUNTER — HOSPITAL ENCOUNTER (EMERGENCY)
Facility: CLINIC | Age: 28
Discharge: HOME | End: 2018-11-08
Attending: NURSE PRACTITIONER | Admitting: NURSE PRACTITIONER
Payer: COMMERCIAL

## 2018-11-08 VITALS
TEMPERATURE: 99.6 F | HEART RATE: 94 BPM | RESPIRATION RATE: 20 BRPM | SYSTOLIC BLOOD PRESSURE: 138 MMHG | DIASTOLIC BLOOD PRESSURE: 90 MMHG | OXYGEN SATURATION: 99 %

## 2018-11-08 DIAGNOSIS — N10 ACUTE PYELONEPHRITIS: ICD-10-CM

## 2018-11-08 LAB
ALBUMIN SERPL-MCNC: 3.6 G/DL (ref 3.4–5)
ALP SERPL-CCNC: 132 U/L (ref 40–150)
ALT SERPL W P-5'-P-CCNC: 52 U/L (ref 0–50)
ANION GAP SERPL CALCULATED.3IONS-SCNC: 6 MMOL/L (ref 3–14)
AST SERPL W P-5'-P-CCNC: 43 U/L (ref 0–45)
BASOPHILS # BLD AUTO: 0 10E9/L (ref 0–0.2)
BASOPHILS NFR BLD AUTO: 0.3 %
BILIRUB SERPL-MCNC: 0.3 MG/DL (ref 0.2–1.3)
BUN SERPL-MCNC: 10 MG/DL (ref 7–30)
CALCIUM SERPL-MCNC: 9 MG/DL (ref 8.5–10.1)
CHLORIDE SERPL-SCNC: 106 MMOL/L (ref 94–109)
CO2 SERPL-SCNC: 30 MMOL/L (ref 20–32)
CREAT SERPL-MCNC: 0.7 MG/DL (ref 0.52–1.04)
DIFFERENTIAL METHOD BLD: NORMAL
EOSINOPHIL # BLD AUTO: 0.1 10E9/L (ref 0–0.7)
EOSINOPHIL NFR BLD AUTO: 1.6 %
ERYTHROCYTE [DISTWIDTH] IN BLOOD BY AUTOMATED COUNT: 12.7 % (ref 10–15)
GFR SERPL CREATININE-BSD FRML MDRD: >90 ML/MIN/1.7M2
GLUCOSE SERPL-MCNC: 81 MG/DL (ref 70–99)
HCT VFR BLD AUTO: 40.1 % (ref 35–47)
HGB BLD-MCNC: 13.1 G/DL (ref 11.7–15.7)
IMM GRANULOCYTES # BLD: 0 10E9/L (ref 0–0.4)
IMM GRANULOCYTES NFR BLD: 0.1 %
LYMPHOCYTES # BLD AUTO: 2.5 10E9/L (ref 0.8–5.3)
LYMPHOCYTES NFR BLD AUTO: 36.1 %
MCH RBC QN AUTO: 29 PG (ref 26.5–33)
MCHC RBC AUTO-ENTMCNC: 32.7 G/DL (ref 31.5–36.5)
MCV RBC AUTO: 89 FL (ref 78–100)
MONOCYTES # BLD AUTO: 0.5 10E9/L (ref 0–1.3)
MONOCYTES NFR BLD AUTO: 7.2 %
NEUTROPHILS # BLD AUTO: 3.8 10E9/L (ref 1.6–8.3)
NEUTROPHILS NFR BLD AUTO: 54.7 %
NRBC # BLD AUTO: 0 10*3/UL
NRBC BLD AUTO-RTO: 0 /100
PLATELET # BLD AUTO: 326 10E9/L (ref 150–450)
POTASSIUM SERPL-SCNC: 3.9 MMOL/L (ref 3.4–5.3)
PROT SERPL-MCNC: 7.4 G/DL (ref 6.8–8.8)
RBC # BLD AUTO: 4.52 10E12/L (ref 3.8–5.2)
SODIUM SERPL-SCNC: 142 MMOL/L (ref 133–144)
WBC # BLD AUTO: 6.9 10E9/L (ref 4–11)

## 2018-11-08 PROCEDURE — 80053 COMPREHEN METABOLIC PANEL: CPT | Performed by: NURSE PRACTITIONER

## 2018-11-08 PROCEDURE — 87086 URINE CULTURE/COLONY COUNT: CPT | Performed by: NURSE PRACTITIONER

## 2018-11-08 PROCEDURE — 99285 EMERGENCY DEPT VISIT HI MDM: CPT | Mod: 25 | Performed by: NURSE PRACTITIONER

## 2018-11-08 PROCEDURE — 25000128 H RX IP 250 OP 636: Performed by: NURSE PRACTITIONER

## 2018-11-08 PROCEDURE — 96375 TX/PRO/DX INJ NEW DRUG ADDON: CPT | Performed by: NURSE PRACTITIONER

## 2018-11-08 PROCEDURE — 99284 EMERGENCY DEPT VISIT MOD MDM: CPT | Mod: Z6 | Performed by: NURSE PRACTITIONER

## 2018-11-08 PROCEDURE — 85025 COMPLETE CBC W/AUTO DIFF WBC: CPT | Performed by: NURSE PRACTITIONER

## 2018-11-08 PROCEDURE — 96374 THER/PROPH/DIAG INJ IV PUSH: CPT | Performed by: NURSE PRACTITIONER

## 2018-11-08 PROCEDURE — 96361 HYDRATE IV INFUSION ADD-ON: CPT | Performed by: NURSE PRACTITIONER

## 2018-11-08 RX ORDER — OXYCODONE HYDROCHLORIDE 5 MG/1
5 TABLET ORAL EVERY 6 HOURS PRN
Qty: 8 TABLET | Refills: 0 | Status: SHIPPED | OUTPATIENT
Start: 2018-11-08 | End: 2019-03-27

## 2018-11-08 RX ORDER — HYDROMORPHONE HYDROCHLORIDE 1 MG/ML
0.5 INJECTION, SOLUTION INTRAMUSCULAR; INTRAVENOUS; SUBCUTANEOUS
Status: COMPLETED | OUTPATIENT
Start: 2018-11-08 | End: 2018-11-08

## 2018-11-08 RX ORDER — SODIUM CHLORIDE 9 MG/ML
1000 INJECTION, SOLUTION INTRAVENOUS CONTINUOUS
Status: DISCONTINUED | OUTPATIENT
Start: 2018-11-08 | End: 2018-11-08 | Stop reason: HOSPADM

## 2018-11-08 RX ORDER — ONDANSETRON 2 MG/ML
4 INJECTION INTRAMUSCULAR; INTRAVENOUS ONCE
Status: COMPLETED | OUTPATIENT
Start: 2018-11-08 | End: 2018-11-08

## 2018-11-08 RX ORDER — PHENAZOPYRIDINE HYDROCHLORIDE 200 MG/1
200 TABLET, FILM COATED ORAL 3 TIMES DAILY
Qty: 9 TABLET | Refills: 0 | Status: SHIPPED | OUTPATIENT
Start: 2018-11-08 | End: 2019-02-03

## 2018-11-08 RX ADMIN — ONDANSETRON HYDROCHLORIDE 4 MG: 2 INJECTION, SOLUTION INTRAMUSCULAR; INTRAVENOUS at 13:15

## 2018-11-08 RX ADMIN — SODIUM CHLORIDE 1000 ML: 9 INJECTION, SOLUTION INTRAVENOUS at 13:15

## 2018-11-08 RX ADMIN — Medication 0.5 MG: at 13:15

## 2018-11-08 ASSESSMENT — ENCOUNTER SYMPTOMS
DYSURIA: 1
CHILLS: 1
COUGH: 0
DIZZINESS: 0
VOMITING: 0
HEMATURIA: 0
FLANK PAIN: 1
FEVER: 1
FATIGUE: 1
FREQUENCY: 1
HEADACHES: 0
DIARRHEA: 0
BACK PAIN: 1
NAUSEA: 1
ABDOMINAL PAIN: 1

## 2018-11-08 NOTE — DISCHARGE INSTRUCTIONS
"Drink plenty of fluids.  Continue Ciprofloxacin.  Pyridium 200mg three times a day as needed for pain with urination.  Tylenol or Ibuprofen for pain mild/moderate pain.  Add Oxycodone 5mg every  6 hours as needed for more severe pain.  Do not use alcohol, operate machinery, drive, or climb on ladders for 8 hours after taking Percocet. Use docusate (100mg) 2 times a day to prevent constipation while on narcotics.  Return to the emergency department for persistent fevers > 2 more days, vomiting-unable to keep fluids down, or pain increasing.        Kidney Infection (Adult Female)    An infection in one or both kidneys is called \"pyelonephritis.\" It usually happens when bacteria (or rarely, viruses, fungi, or other disease-causing organisms) get into the kidney. The bacteria (or other disease-causing organisms) can enter the kidneys from the bladder or blood traveling from other parts of the body. A kidney infection can become serious. It can cause severe illness, scarring of the kidneys, or kidney failure if not treated properly.  Common causes for this problem include:    Not keeping the genital area clean and dry, which promotes the growth of bacteria    Wiping back to front which drags bacteria from the rectum toward the urinary opening (urethra)    Wearing tight pants or underwear (this lets moisture build up in the genital area, which helps bacteria grow)    Holding urine in for long periods of time    Dehydration  Kidney infections can cause symptoms similar to a bladder infection. Symptoms include:    Pain (or burning) when urinating    Having to urinate more often than usual    Blood in the urine (pink or red)    Abdominal pain or discomfort, usually in the lower abdomen    Pain in the side or back    Pain above the pubic bone    Fever or chills    Vomiting    Loss of appetite  Treatment is oral antibiotics, or in more severe cases, intramuscular or IV antibiotics. These are started right away and may be " changed once urine culture results determine the infecting organisms. Treatment helps prevent a more serious kidney infection.  Medicines  Medicines can help in the treatment of a bladder infection:    Take antibiotics until they are used up, even if you feel better. It is important to finish them to make sure the infection is gone.    Unless another medicine was prescribed, you can use over-the-counter medicines for pain, fever, or discomfort. If you have chronic liver of kidney disease, talk with your healthcare provider before using these medicines. Also talk with your provider if you've ever had a stomach ulcer or gastrointestinal (GI) bleeding, or are taking blood thinners.  Home care  The following are general care guidelines:    Stay home from work or school. Rest in bed until your fever breaks and you are feeling better, or as advised by your healthcare provider.    Drink lots of fluid unless you must restrict fluids for other medical reasons. This will force the medicine into your urinary system and flush the bacteria out of your body. Ask your healthcare provider how much you should drink.    Don't have sex until you have finished all of your medicine and your symptoms are gone.    Don't have caffeine, alcohol, or spicy foods. These foods may irritate the kidneys and bladder.    Don't take bubble baths. Sensitivity to the chemicals in bubble baths can irritate the urethra.    Make sure you wipe from front to back after using the toilet.    Wear loose cloths and cotton underwear.  Prevention  These self-care steps can help prevent future infections:    Drink plenty of fluids to prevent dehydration and flush out the bladder. Do this unless you must restrict fluids for other health reasons, or your healthcare provider told you not to.    Proper cleaning after going to the bathroom in important. Make sure you wipe from front to back after using the toilet.    Urinate more often. Don't try to hold urine in for  a long time.    Don't wear tight-fitting pants and underwear.    Improve your diet to prevent constipation. Eat more fruits, vegetables, and fiber. Eat less junk and fatty foods. Constipation can make a urinary tract infection more likely. Talk with your healthcare provider if you have trouble with bowel movements.    Urinate right after intercourse to flush out the bladder.  Follow-up care  Follow up with your healthcare provider, or as advised. Additional testing may be needed to make sure the infection has cleared. Close follow-up and further testing is very important to find the cause and to prevent future infections.  If a urine culture was done, you will be contacted if your treatment needs to be changed. If directed, you may call to find out the results.  If you had an X-ray, CT scan, or other diagnostic test, you will be notified of any new findings that may affect your care.  Call 911  Call 911 if any of the following occur:    Trouble breathing    Fainting or loss of consciousness    Rapid or very slow heart rate    Weakness, dizziness, or fainting    Difficulty arousing or confusion  When to seek medical advice  Call your healthcare provider right away if any of these occur:    Fever 100.4 F (38 C) or higher, or as directed by your healthcare provider    Not feeling better within 1 to 2 days after starting antibiotics    Any symptom that continues after 3 days of treatment    Increasing pain in the stomach, back, side, or groin area    Repeated vomiting    Not able to take prescribed medicine due to nausea or another reason    Bloody, dark-colored, or foul smelling urine    Trouble urinating or decreased urine output    No urine for 8 hours, no tears when crying, sunken eyes, or dry mouth  Date Last Reviewed: 10/1/2016    2242-1840 [employer]. [address] [place] [id number]. All rights reserved. This information is not intended as a substitute for professional medical care.  Always follow your healthcare professional's instructions.      Opioid Medication Information    Pain medications are among the most commonly prescribed medicines, so we are including this information for all our patients. If you did not receive pain medication or get a prescription for pain medicine, you can ignore it.     You may have been given a prescription for an opioid (narcotic) pain medicine and/or have received a pain medicine while here in the Emergency Department. These medicines can make you drowsy or impaired. You must not drive, operate dangerous equipment, or engage in any other dangerous activities while taking these medications. If you drive while taking these medications, you could be arrested for DUI, or driving under the influence. Do not drink any alcohol while you are taking these medications.     Opioid pain medications can cause addiction. If you have a history of chemical dependency of any type, you are at a higher risk of becoming addicted to pain medications.  Only take these prescribed medications to treat your pain when all other options have been tried. Take it for as short a time and as few doses as possible. Store your pain pills in a secure place, as they are frequently stolen and provide a dangerous opportunity for children or visitors in your house to start abusing these powerful medications. We will not replace any lost or stolen medicine.  As soon as your pain is better, you should flush all your remaining medication.     Many prescription pain medications contain Tylenol  (acetaminophen), including Vicodin , Tylenol #3 , Norco , Lortab , and Percocet .  You should not take any extra pills of Tylenol  if you are using these prescription medications or you can get very sick.  Do not ever take more than 3000 mg of acetaminophen in any 24 hour period.    All opioids tend to cause constipation. Drink plenty of water and eat foods that have a lot of fiber, such as fruits, vegetables,  prune juice, apple juice and high fiber cereal.  Take a laxative if you don t move your bowels at least every other day. Miralax , Milk of Magnesia, Colace , or Senna  can be used to keep you regular.

## 2018-11-08 NOTE — TELEPHONE ENCOUNTER
Received a medication refill request from [facility] for cyclobenzaprine for patient. LVM for patient requesting information on if this medication is still needed. Form is in Dr Lopez's box in [place] awaiting signature for approval once word is received from the patient.  Willian Kelly, ATC

## 2018-11-08 NOTE — ED AVS SNAPSHOT
" [facility] Emergency Department    [address] [facility] [address]    Phone:  [phone]    Fax:  [phone]                                       Laura Grewal   MRN: [id number]    Department:  [facility] Emergency Department   Date of Visit:  11/8/2018           Patient Information     Date Of Birth          1990        Your diagnoses for this visit were:     Acute pyelonephritis        You were seen by Jacqui Perez APRN CNP.      Follow-up Information     Follow up with Clinic, [facility].    Why:  As needed    Contact information:    [address] [place] [id number] [phone]          Follow up with [facility] Emergency Department.    Specialty:  EMERGENCY MEDICINE    Why:  If symptoms worsen    Contact information:    [address] [place] [id number] [phone]    Additional information:    The medical center is located at   [address] [place] [address] (between [facility] and   [place] [address] in [place], [place] miles north   of [place]).        Discharge Instructions       Drink plenty of fluids.  Continue Ciprofloxacin.  Pyridium 200mg three times a day as needed for pain with urination.  Tylenol or Ibuprofen for pain mild/moderate pain.  Add Oxycodone 5mg every  6 hours as needed for more severe pain.  Do not use alcohol, operate machinery, drive, or climb on ladders for 8 hours after taking Percocet. Use docusate (100mg) 2 times a day to prevent constipation while on narcotics.  Return to the emergency department for persistent fevers > 2 more days, vomiting-unable to keep fluids down, or pain increasing.        Kidney Infection (Adult Female)    An infection in one or both kidneys is called \"pyelonephritis.\" It usually happens when bacteria (or rarely, viruses, fungi, or other disease-causing organisms) get into the kidney. The bacteria (or other disease-causing organisms) can enter the kidneys from the bladder or blood traveling from " other parts of the body. A kidney infection can become serious. It can cause severe illness, scarring of the kidneys, or kidney failure if not treated properly.  Common causes for this problem include:    Not keeping the genital area clean and dry, which promotes the growth of bacteria    Wiping back to front which drags bacteria from the rectum toward the urinary opening (urethra)    Wearing tight pants or underwear (this lets moisture build up in the genital area, which helps bacteria grow)    Holding urine in for long periods of time    Dehydration  Kidney infections can cause symptoms similar to a bladder infection. Symptoms include:    Pain (or burning) when urinating    Having to urinate more often than usual    Blood in the urine (pink or red)    Abdominal pain or discomfort, usually in the lower abdomen    Pain in the side or back    Pain above the pubic bone    Fever or chills    Vomiting    Loss of appetite  Treatment is oral antibiotics, or in more severe cases, intramuscular or IV antibiotics. These are started right away and may be changed once urine culture results determine the infecting organisms. Treatment helps prevent a more serious kidney infection.  Medicines  Medicines can help in the treatment of a bladder infection:    Take antibiotics until they are used up, even if you feel better. It is important to finish them to make sure the infection is gone.    Unless another medicine was prescribed, you can use over-the-counter medicines for pain, fever, or discomfort. If you have chronic liver of kidney disease, talk with your healthcare provider before using these medicines. Also talk with your provider if you've ever had a stomach ulcer or gastrointestinal (GI) bleeding, or are taking blood thinners.  Home care  The following are general care guidelines:    Stay home from work or school. Rest in bed until your fever breaks and you are feeling better, or as advised by your healthcare  provider.    Drink lots of fluid unless you must restrict fluids for other medical reasons. This will force the medicine into your urinary system and flush the bacteria out of your body. Ask your healthcare provider how much you should drink.    Don't have sex until you have finished all of your medicine and your symptoms are gone.    Don't have caffeine, alcohol, or spicy foods. These foods may irritate the kidneys and bladder.    Don't take bubble baths. Sensitivity to the chemicals in bubble baths can irritate the urethra.    Make sure you wipe from front to back after using the toilet.    Wear loose cloths and cotton underwear.  Prevention  These self-care steps can help prevent future infections:    Drink plenty of fluids to prevent dehydration and flush out the bladder. Do this unless you must restrict fluids for other health reasons, or your healthcare provider told you not to.    Proper cleaning after going to the bathroom in important. Make sure you wipe from front to back after using the toilet.    Urinate more often. Don't try to hold urine in for a long time.    Don't wear tight-fitting pants and underwear.    Improve your diet to prevent constipation. Eat more fruits, vegetables, and fiber. Eat less junk and fatty foods. Constipation can make a urinary tract infection more likely. Talk with your healthcare provider if you have trouble with bowel movements.    Urinate right after intercourse to flush out the bladder.  Follow-up care  Follow up with your healthcare provider, or as advised. Additional testing may be needed to make sure the infection has cleared. Close follow-up and further testing is very important to find the cause and to prevent future infections.  If a urine culture was done, you will be contacted if your treatment needs to be changed. If directed, you may call to find out the results.  If you had an X-ray, CT scan, or other diagnostic test, you will be notified of any new findings that  may affect your care.  Call 911  Call 911 if any of the following occur:    Trouble breathing    Fainting or loss of consciousness    Rapid or very slow heart rate    Weakness, dizziness, or fainting    Difficulty arousing or confusion  When to seek medical advice  Call your healthcare provider right away if any of these occur:    Fever 100.4 F (38 C) or higher, or as directed by your healthcare provider    Not feeling better within 1 to 2 days after starting antibiotics    Any symptom that continues after 3 days of treatment    Increasing pain in the stomach, back, side, or groin area    Repeated vomiting    Not able to take prescribed medicine due to nausea or another reason    Bloody, dark-colored, or foul smelling urine    Trouble urinating or decreased urine output    No urine for 8 hours, no tears when crying, sunken eyes, or dry mouth  Date Last Reviewed: 10/1/2016    6243-4250 [employer]. [address] [place] [address]. All rights reserved. This information is not intended as a substitute for professional medical care. Always follow your healthcare professional's instructions.      Opioid Medication Information    Pain medications are among the most commonly prescribed medicines, so we are including this information for all our patients. If you did not receive pain medication or get a prescription for pain medicine, you can ignore it.     You may have been given a prescription for an opioid (narcotic) pain medicine and/or have received a pain medicine while here in the Emergency Department. These medicines can make you drowsy or impaired. You must not drive, operate dangerous equipment, or engage in any other dangerous activities while taking these medications. If you drive while taking these medications, you could be arrested for DUI, or driving under the influence. Do not drink any alcohol while you are taking these medications.     Opioid pain medications can cause addiction. If  you have a history of chemical dependency of any type, you are at a higher risk of becoming addicted to pain medications.  Only take these prescribed medications to treat your pain when all other options have been tried. Take it for as short a time and as few doses as possible. Store your pain pills in a secure place, as they are frequently stolen and provide a dangerous opportunity for children or visitors in your house to start abusing these powerful medications. We will not replace any lost or stolen medicine.  As soon as your pain is better, you should flush all your remaining medication.     Many prescription pain medications contain Tylenol  (acetaminophen), including Vicodin , Tylenol #3 , Norco , Lortab , and Percocet .  You should not take any extra pills of Tylenol  if you are using these prescription medications or you can get very sick.  Do not ever take more than 3000 mg of acetaminophen in any 24 hour period.    All opioids tend to cause constipation. Drink plenty of water and eat foods that have a lot of fiber, such as fruits, vegetables, prune juice, apple juice and high fiber cereal.  Take a laxative if you don t move your bowels at least every other day. Miralax , Milk of Magnesia, Colace , or Senna  can be used to keep you regular.                  24 Hour Appointment Hotline       To make an appointment at any [facility], call [phone] ([phone]). If you don't have a family doctor or clinic, we will help you find one. [facility] clinics are conveniently located to serve the needs of you and your family.             Review of your medicines      START taking        Dose / Directions Last dose taken    oxyCODONE IR 5 MG tablet   Commonly known as:  ROXICODONE   Dose:  5 mg   Quantity:  8 tablet        Take 1 tablet (5 mg) by mouth every 6 hours as needed for pain   Refills:  0        phenazopyridine 200 MG tablet   Commonly known as:  PYRIDIUM   Dose:  200 mg   Quantity:  9 tablet         Take 1 tablet (200 mg) by mouth 3 times daily for 3 days   Refills:  0          Our records show that you are taking the medicines listed below. If these are incorrect, please call your family doctor or clinic.        Dose / Directions Last dose taken    albuterol 108 (90 Base) MCG/ACT inhaler   Commonly known as:  PROAIR HFA/PROVENTIL HFA/VENTOLIN HFA   Dose:  2 puff   Quantity:  3 Inhaler        Inhale 2 puffs into the lungs every 6 hours as needed for shortness of breath / dyspnea or wheezing   Refills:  1        ciprofloxacin 500 MG tablet   Commonly known as:  CIPRO   Dose:  500 mg   Quantity:  14 tablet        Take 1 tablet (500 mg) by mouth 2 times daily for 7 days   Refills:  0        cyclobenzaprine 5 MG tablet   Commonly known as:  FLEXERIL   Dose:  5 mg   Quantity:  30 tablet        Take 1 tablet (5 mg) by mouth 2 times daily as needed for muscle spasms   Refills:  0        IBUPROFEN PO   Dose:  200-400 mg        Take 200-400 mg by mouth every 6 hours as needed for moderate pain   Refills:  0        ondansetron 4 MG ODT tab   Commonly known as:  ZOFRAN ODT   Dose:  4 mg   Quantity:  10 tablet        Take 1 tablet (4 mg) by mouth every 6 hours as needed for nausea   Refills:  0        tiZANidine 2 MG tablet   Commonly known as:  ZANAFLEX   Dose:  2 mg   Quantity:  30 tablet        Take 1 tablet (2 mg) by mouth 3 times daily as needed for muscle spasms   Refills:  0          STOP taking        Dose Reason for stopping Comments    HYDROcodone-acetaminophen 5-325 MG per tablet   Commonly known as:  NORCO                      Information about OPIOIDS     PRESCRIPTION OPIOIDS: WHAT YOU NEED TO KNOW   We gave you an opioid (narcotic) pain medicine. It is important to manage your pain, but opioids are not always the best choice. You should first try all the other options your care team gave you. Take this medicine for as short a time (and as few doses) as possible.    Some activities can increase your pain,  such as bandage changes or therapy sessions. It may help to take your pain medicine 30 to 60 minutes before these activities. Reduce your stress by getting enough sleep, working on hobbies you enjoy and practicing relaxation or meditation. Talk to your care team about ways to manage your pain beyond prescription opioids.    These medicines have risks:    DO NOT drive when on new or higher doses of pain medicine. These medicines can affect your alertness and reaction times, and you could be arrested for driving under the influence (DUI). If you need to use opioids long-term, talk to your care team about driving.    DO NOT operate heavy machinery    DO NOT do any other dangerous activities while taking these medicines.    DO NOT drink any alcohol while taking these medicines.     If the opioid prescribed includes acetaminophen, DO NOT take with any other medicines that contain acetaminophen. Read all labels carefully. Look for the word  acetaminophen  or  Tylenol.  Ask your pharmacist if you have questions or are unsure.    You can get addicted to pain medicines, especially if you have a history of addiction (chemical, alcohol or substance dependence). Talk to your care team about ways to reduce this risk.    All opioids tend to cause constipation. Drink plenty of water and eat foods that have a lot of fiber, such as fruits, vegetables, prune juice, apple juice and high-fiber cereal. Take a laxative (Miralax, milk of magnesia, Colace, Senna) if you don t move your bowels at least every other day. Other side effects include upset stomach, sleepiness, dizziness, throwing up, tolerance (needing more of the medicine to have the same effect), physical dependence and slowed breathing.    Store your pills in a secure place, locked if possible. We will not replace any lost or stolen medicine. If you don t finish your medicine, please throw away (dispose) as directed by your pharmacist. The [place] [employer]  has more information about safe disposal: https://www.pca.[facility].[place].us/living-green/managing-unwanted-medications        Prescriptions were sent or printed at these locations (2 Prescriptions)                   [id number] - [place], [place] - [address]   [address], [facility] [address]    Telephone:  [phone]   Fax:  [phone]   Hours:                  E-Prescribed (1 of 2)         phenazopyridine (PYRIDIUM) 200 MG tablet                 Printed at Department/Unit printer (1 of 2)         oxyCODONE IR (ROXICODONE) 5 MG tablet                Procedures and tests performed during your visit     CBC with platelets differential    Comprehensive metabolic panel    Urine Culture      Orders Needing Specimen Collection     Ordered          11/08/18 1308  CBC with platelets differential - STAT, Prio: STAT, Final result     Scheduled Task Status   11/08/18 1259 [employer] CC Reminder: Open   Order Class:  PCU Collect                11/08/18 1308  Comprehensive metabolic panel - STAT, Prio: STAT, Final result     Scheduled Task Status   11/08/18 1259 [employer] CC Reminder: Open   Order Class:  PCU Collect                11/08/18 1308  Urine Culture - ROUTINE, Prio: Routine, In process     Scheduled Task Status   11/08/18 1259 [employer] [facility] Reminder: Open   Order Class:  PCU Collect                  Pending Results     Date and Time Order Name Status Description    11/8/2018 1308 Urine Culture In process             Pending Culture Results     Date and Time Order Name Status Description    11/8/2018 1308 Urine Culture In process             Pending Results Instructions     If you had any lab results that were not finalized at the time of your Discharge, you can call the ED Lab Result RN at [phone]. You will be contacted by this team for any positive Lab results or changes in treatment. The nurses are available 7 days a week from 10A to 6:30P.  You can leave a message 24 hours per day and they will  return your call.        Test Results From Your Hospital Stay        11/8/2018  1:42 PM      Component Results     Component Value Ref Range & Units Status    WBC 6.9 4.0 - 11.0 10e9/L Final    RBC Count 4.52 3.8 - 5.2 10e12/L Final    Hemoglobin 13.1 11.7 - 15.7 g/dL Final    Hematocrit 40.1 35.0 - 47.0 % Final    MCV 89 78 - 100 fl Final    MCH 29.0 26.5 - 33.0 pg Final    MCHC 32.7 31.5 - 36.5 g/dL Final    RDW 12.7 10.0 - 15.0 % Final    Platelet Count 326 150 - 450 10e9/L Final    Diff Method Automated Method  Final    % Neutrophils 54.7 % Final    % Lymphocytes 36.1 % Final    % Monocytes 7.2 % Final    % Eosinophils 1.6 % Final    % Basophils 0.3 % Final    % Immature Granulocytes 0.1 % Final    Nucleated RBCs 0 0 /100 Final    Absolute Neutrophil 3.8 1.6 - 8.3 10e9/L Final    Absolute Lymphocytes 2.5 0.8 - 5.3 10e9/L Final    Absolute Monocytes 0.5 0.0 - 1.3 10e9/L Final    Absolute Eosinophils 0.1 0.0 - 0.7 10e9/L Final    Absolute Basophils 0.0 0.0 - 0.2 10e9/L Final    Abs Immature Granulocytes 0.0 0 - 0.4 10e9/L Final    Absolute Nucleated RBC 0.0  Final         11/8/2018  2:09 PM      Component Results     Component Value Ref Range & Units Status    Sodium 142 133 - 144 mmol/L Final    Potassium 3.9 3.4 - 5.3 mmol/L Final    Chloride 106 94 - 109 mmol/L Final    Carbon Dioxide 30 20 - 32 mmol/L Final    Anion Gap 6 3 - 14 mmol/L Final    Glucose 81 70 - 99 mg/dL Final    Urea Nitrogen 10 7 - 30 mg/dL Final    Creatinine 0.70 0.52 - 1.04 mg/dL Final    GFR Estimate >90 >60 mL/min/1.7m2 Final    Non  GFR Calc    GFR Estimate If Black >90 >60 mL/min/1.7m2 Final    African American GFR Calc    Calcium 9.0 8.5 - 10.1 mg/dL Final    Bilirubin Total 0.3 0.2 - 1.3 mg/dL Final    Albumin 3.6 3.4 - 5.0 g/dL Final    Protein Total 7.4 6.8 - 8.8 g/dL Final    Alkaline Phosphatase 132 40 - 150 U/L Final    ALT 52 (H) 0 - 50 U/L Final    AST 43 0 - 45 U/L Final         11/8/2018  1:15 PM                 Thank you for choosing [facility]       Thank you for choosing [facility] for your care. Our goal is always to provide you with excellent care. Hearing back from our patients is one way we can continue to improve our services. Please take a few minutes to complete the written survey that you may receive in the mail after you visit with us. Thank you!        [employer] Information     [employer] gives you secure access to your electronic health record. If you see a primary care provider, you can also send messages to your care team and make appointments. If you have questions, please call your primary care clinic.  If you do not have a primary care provider, please call [phone] and they will assist you.        Care EveryWhere ID     This is your Care EveryWhere ID. This could be used by other organizations to access your [facility] medical records  [id number]        Equal Access to Services     MELISA MI : Tami Aj, mani moscoso, amy galicia. So [facility] [phone].    ATENCIÓN: Si habla español, tiene a sparks disposición servicios gratuitos de asistencia lingüística. Llame al [phone].    We comply with applicable federal civil rights laws and [place] laws. We do not discriminate on the basis of race, color, national origin, age, disability, sex, sexual orientation, or gender identity.            After Visit Summary       This is your record. Keep this with you and show to your community pharmacist(s) and doctor(s) at your next visit.

## 2018-11-08 NOTE — ED PROVIDER NOTES
History     Chief Complaint   Patient presents with     Flank Pain     currently being treated for pylonephritis. pain is the same.     NAVA Grewal is a 27 year old female who presents to the emergency department for ongoing right flank and right abdominal pain.  Patient was evaluated here yesterday and diagnosed with pyelonephritis.  Patient states she continues to have pain and dysuria.  Patient received IV Rocephin yesterday and has taken 2 doses of ciprofloxacin so far.  Low-grade fevers.  Feels nauseated but no vomiting.  Patient is treating her pain with Norco that was prescribed yesterday without much relief.    Problem List:    Patient Active Problem List    Diagnosis Date Noted     mirena IUD 03/23/2017     Priority: Medium     Mirena IUD placed today by Mago Chaidez MD  LOT# [id number]  Exp:09/19       Migraine without status migrainosus, not intractable, unspecified migraine type 06/09/2016     Priority: Medium     KERRY (generalized anxiety disorder) 06/09/2016     Priority: Medium     Did not tolerate Zoloft.  Currently trying lexapro       Genital warts 12/09/2015     Priority: Medium     Sinus tachycardia 01/14/2015     Priority: Medium     Intermittent asthma 05/27/2014     Priority: Medium     GERD (gastroesophageal reflux disease) 06/10/2013     Priority: Medium     Psoriasis 10/16/2008     Priority: Medium     Has tried clindamycin, triamcinolone, and differin.  On nothing currently.       Congenital vascular nevus 05/24/2007     Priority: Medium     Right forehead       Temporomandibular joint disorder 03/20/2007     Priority: Medium     right side       CARDIOVASCULAR SCREENING; LDL GOAL LESS THAN 130 01/31/2012     Priority: Low        Past Medical History:    Past Medical History:   Diagnosis Date     Acute sinusitis, unspecified      Acute upper respiratory infections of unspecified site      Chickenpox      Depression with anxiety 5/19/2012     Mild major depression (H) 5/19/2012      Unspecified otitis media        Past Surgical History:    Past Surgical History:   Procedure Laterality Date     MOUTH SURGERY      wisdom teeth       Family History:    Family History   Problem Relation Age of Onset     C.A.D. Maternal Grandmother      Asthma Maternal Grandmother      HEART DISEASE Maternal Grandmother      pacemaker     Cancer - colorectal Paternal Grandmother      colon     Cerebrovascular Disease Paternal Grandmother      Asthma Sister      GASTROINTESTINAL DISEASE Sister      celiac     Coronary Artery Disease Paternal Grandfather        Social History:  Marital Status:  Single [1]  Social History   Substance Use Topics     Smoking status: Never Smoker     Smokeless tobacco: Never Used     Alcohol use 0.0 oz/week     0 Standard drinks or equivalent per week      Comment: occas-quit with pregnancy        Medications:      ciprofloxacin (CIPRO) 500 MG tablet   cyclobenzaprine (FLEXERIL) 5 MG tablet   ondansetron (ZOFRAN ODT) 4 MG ODT tab   oxyCODONE IR (ROXICODONE) 5 MG tablet   phenazopyridine (PYRIDIUM) 200 MG tablet   tiZANidine (ZANAFLEX) 2 MG tablet   albuterol (PROAIR HFA/PROVENTIL HFA/VENTOLIN HFA) 108 (90 BASE) MCG/ACT Inhaler   IBUPROFEN PO         Review of Systems   Constitutional: Positive for chills, fatigue and fever.   HENT: Negative for congestion.    Respiratory: Negative for cough.    Cardiovascular: Negative for chest pain.   Gastrointestinal: Positive for abdominal pain and nausea. Negative for diarrhea and vomiting.   Genitourinary: Positive for dysuria, flank pain and frequency. Negative for hematuria and urgency.   Musculoskeletal: Positive for back pain.   Skin: Negative for rash.   Neurological: Negative for dizziness and headaches.       Physical Exam   BP: 138/90  Pulse: 94  Temp: 99.6  F (37.6  C)  Resp: 20  SpO2: 99 %      Physical Exam   Constitutional: She appears well-developed and well-nourished. She appears distressed.   HENT:   Head: Normocephalic and  atraumatic.   Right Ear: External ear normal.   Left Ear: External ear normal.   Nose: Nose normal.   Mouth/Throat: Oropharynx is clear and moist.   Eyes: Conjunctivae are normal.   Cardiovascular: Normal rate, regular rhythm and normal heart sounds.    No murmur heard.  Pulmonary/Chest: Effort normal and breath sounds normal.   Abdominal: Soft. Normal appearance and bowel sounds are normal. There is tenderness in the right lower quadrant. There is CVA tenderness (right).       Musculoskeletal: Normal range of motion.   Neurological: She is alert.   Skin: Skin is warm and dry.       ED Course     ED Course     Procedures             Results for orders placed or performed during the hospital encounter of 11/08/18 (from the past 24 hour(s))   CBC with platelets differential   Result Value Ref Range    WBC 6.9 4.0 - 11.0 10e9/L    RBC Count 4.52 3.8 - 5.2 10e12/L    Hemoglobin 13.1 11.7 - 15.7 g/dL    Hematocrit 40.1 35.0 - 47.0 %    MCV 89 78 - 100 fl    MCH 29.0 26.5 - 33.0 pg    MCHC 32.7 31.5 - 36.5 g/dL    RDW 12.7 10.0 - 15.0 %    Platelet Count 326 150 - 450 10e9/L    Diff Method Automated Method     % Neutrophils 54.7 %    % Lymphocytes 36.1 %    % Monocytes 7.2 %    % Eosinophils 1.6 %    % Basophils 0.3 %    % Immature Granulocytes 0.1 %    Nucleated RBCs 0 0 /100    Absolute Neutrophil 3.8 1.6 - 8.3 10e9/L    Absolute Lymphocytes 2.5 0.8 - 5.3 10e9/L    Absolute Monocytes 0.5 0.0 - 1.3 10e9/L    Absolute Eosinophils 0.1 0.0 - 0.7 10e9/L    Absolute Basophils 0.0 0.0 - 0.2 10e9/L    Abs Immature Granulocytes 0.0 0 - 0.4 10e9/L    Absolute Nucleated RBC 0.0    Comprehensive metabolic panel   Result Value Ref Range    Sodium 142 133 - 144 mmol/L    Potassium 3.9 3.4 - 5.3 mmol/L    Chloride 106 94 - 109 mmol/L    Carbon Dioxide 30 20 - 32 mmol/L    Anion Gap 6 3 - 14 mmol/L    Glucose 81 70 - 99 mg/dL    Urea Nitrogen 10 7 - 30 mg/dL    Creatinine 0.70 0.52 - 1.04 mg/dL    GFR Estimate >90 >60 mL/min/1.7m2     GFR Estimate If Black >90 >60 mL/min/1.7m2    Calcium 9.0 8.5 - 10.1 mg/dL    Bilirubin Total 0.3 0.2 - 1.3 mg/dL    Albumin 3.6 3.4 - 5.0 g/dL    Protein Total 7.4 6.8 - 8.8 g/dL    Alkaline Phosphatase 132 40 - 150 U/L    ALT 52 (H) 0 - 50 U/L    AST 43 0 - 45 U/L       Medications   0.9% sodium chloride BOLUS (0 mLs Intravenous Stopped 11/8/18 1420)     Followed by   sodium chloride 0.9% infusion (not administered)   ondansetron (ZOFRAN) injection 4 mg (4 mg Intravenous Given 11/8/18 1315)   HYDROmorphone (PF) (DILAUDID) injection 0.5 mg (0.5 mg Intravenous Given 11/8/18 1315)       Assessments & Plan (with Medical Decision Making)   Laura Grewal is a 27 year old female who presents to the emergency department for ongoing right flank and right abdominal pain.  Patient was evaluated here yesterday and diagnosed with pyelonephritis.  Patient states she continues to have pain and dysuria.  Patient received IV Rocephin yesterday and has taken 2 doses of ciprofloxacin so far.  Low-grade fevers.  Feels nauseated but no vomiting.  Patient is treating her pain with Norco that was prescribed yesterday without much relief.    On exam patient appears distress, in pain.  No fever.  No tachycardia.  Right lower quadrant abdominal tenderness.  Positive right CVA tenderness.  Repeat blood work was obtained today and patient's WBC is now in the normal range compared to 15.1 from yesterday.  Electrolytes are normal.  Kidney function tests are normal.  Patient received IV normal saline 1 L bolus, IV Zofran, and IV Dilaudid with improvement in her pain.  Upon entering the room for recheck patient's toddler is laying/sleeping on top of her abdomen.  Patient has had no vomiting here.  I discussed with patient that I would expect her symptoms to continue to improve.  Patient has not been on antibiotics long enough for me to consider a failure of her antibiotics since she is only had 2 doses so far.  I instructed patient to continue  with her ciprofloxacin.  Patient inquires about something stronger for pain and I did discontinue her hydrocodone and gave her a prescription for a small number (#8) of oxycodone 5 mg every 6 hours to use for pain.  I would expect her pain to be improving over the weekend.  Patient was also given a prescription for Pyridium given she describes continued burning with urination.  She was also advised to use Tylenol or ibuprofen for mild to moderate pain.  Patient instructed to stay well-hydrated.  Patient instructed to return for persistent fever greater than 2 days, increased abdominal pain, vomiting, unable to keep fluids down, or getting worse in any way.  Patient is agreeable to the plan.  I have reviewed the nursing notes.    I have reviewed the findings, diagnosis, plan and need for follow up with the patient.      Discharge Medication List as of 11/8/2018  2:24 PM      START taking these medications    Details   oxyCODONE IR (ROXICODONE) 5 MG tablet Take 1 tablet (5 mg) by mouth every 6 hours as needed for pain, Disp-8 tablet, R-0, Local Print      phenazopyridine (PYRIDIUM) 200 MG tablet Take 1 tablet (200 mg) by mouth 3 times daily for 3 days, Disp-9 tablet, R-0, E-Prescribe             Final diagnoses:   Acute pyelonephritis       11/8/2018   [facility] EMERGENCY DEPARTMENT     Jacqui Perez APRN CNP  11/08/18 1444

## 2018-11-08 NOTE — ED NOTES
Discharge instructions, including new Rx, were reviewed with pt and pt's . Pt was ambulatory from the ER.

## 2018-11-09 LAB
BACTERIA SPEC CULT: NO GROWTH
Lab: NORMAL
SPECIMEN SOURCE: NORMAL

## 2018-11-12 ENCOUNTER — OFFICE VISIT (OUTPATIENT)
Dept: FAMILY MEDICINE | Facility: CLINIC | Age: 28
End: 2018-11-12
Payer: COMMERCIAL

## 2018-11-12 VITALS
HEART RATE: 107 BPM | HEIGHT: 62 IN | OXYGEN SATURATION: 99 % | SYSTOLIC BLOOD PRESSURE: 100 MMHG | TEMPERATURE: 98.8 F | DIASTOLIC BLOOD PRESSURE: 76 MMHG | BODY MASS INDEX: 30 KG/M2 | WEIGHT: 163 LBS | RESPIRATION RATE: 14 BRPM

## 2018-11-12 DIAGNOSIS — R10.9 FLANK PAIN: Primary | ICD-10-CM

## 2018-11-12 DIAGNOSIS — R11.0 NAUSEA: ICD-10-CM

## 2018-11-12 DIAGNOSIS — N20.0 CALCULUS OF LEFT KIDNEY: ICD-10-CM

## 2018-11-12 LAB
ALBUMIN UR-MCNC: NEGATIVE MG/DL
APPEARANCE UR: NORMAL
BACTERIA #/AREA URNS HPF: ABNORMAL /HPF
BILIRUB UR QL STRIP: NEGATIVE
COLOR UR AUTO: YELLOW
GLUCOSE UR STRIP-MCNC: NEGATIVE MG/DL
HGB UR QL STRIP: NEGATIVE
KETONES UR STRIP-MCNC: NEGATIVE MG/DL
LEUKOCYTE ESTERASE UR QL STRIP: NEGATIVE
NITRATE UR QL: NEGATIVE
NON-SQ EPI CELLS #/AREA URNS LPF: ABNORMAL /LPF
PH UR STRIP: 7 PH (ref 5–7)
RBC #/AREA URNS AUTO: ABNORMAL /HPF
SOURCE: NORMAL
SP GR UR STRIP: 1.02 (ref 1–1.03)
UROBILINOGEN UR STRIP-ACNC: 0.2 EU/DL (ref 0.2–1)
WBC #/AREA URNS AUTO: ABNORMAL /HPF

## 2018-11-12 PROCEDURE — 81001 URINALYSIS AUTO W/SCOPE: CPT | Performed by: FAMILY MEDICINE

## 2018-11-12 PROCEDURE — 99214 OFFICE O/P EST MOD 30 MIN: CPT | Performed by: FAMILY MEDICINE

## 2018-11-12 RX ORDER — PROCHLORPERAZINE MALEATE 10 MG
10 TABLET ORAL EVERY 6 HOURS PRN
Qty: 20 TABLET | Refills: 1 | Status: SHIPPED | OUTPATIENT
Start: 2018-11-12 | End: 2019-03-27

## 2018-11-12 RX ORDER — PHENAZOPYRIDINE HYDROCHLORIDE 200 MG/1
200 TABLET, FILM COATED ORAL 3 TIMES DAILY PRN
Qty: 6 TABLET | COMMUNITY
Start: 2018-11-12 | End: 2019-03-27

## 2018-11-12 RX ORDER — ONDANSETRON 4 MG/1
4 TABLET, ORALLY DISINTEGRATING ORAL EVERY 6 HOURS PRN
Qty: 10 TABLET | Refills: 0 | COMMUNITY
Start: 2018-11-12 | End: 2019-03-27

## 2018-11-12 NOTE — LETTER
November 12, 2018      Laura Grewal  [address] [address]        To Whom It May Concern:    Laura Grewal  was seen on 11/12/2018.  Please excuse her until 11/19/2018 due to illness.        Sincerely,        Tyrone Couch MD

## 2018-11-12 NOTE — PROGRESS NOTES
SUBJECTIVE:   Laura Grewal is a 27 year old female who presents to clinic today for the following health issues:      ED/UC Followup:    Facility:  [facility]   Date of visit: 11/08/2018  Reason for visit: Left kidney stone   Current Status: Same        URINARY TRACT SYMPTOMS  Onset: 11-6-18    Description: Patient has been in x2 to the ER for kidney infection was but on a pain med with cipro and pyridium and zofran for nausea, she is still having problems with the pain   Painful urination (Dysuria): no   Blood in urine (Hematuria): no   Delay in urine (Hesitency): YES    Intensity: severe    Progression of Symptoms:  worsening with pain dizziness lightheaded and vomiting     Accompanying Signs & Symptoms:  Fever/chills: YES  Flank pain YES  Nausea and vomiting: YES  Any vaginal symptoms: none  Abdominal/Pelvic Pain: YES    History:   History of frequent UTI's: no   History of kidney stones: YES  Sexually Active:no  Possibility of pregnancy: No    Precipitating factors:   no    Therapies Tried and outcome: Patient has ilya in the ER x2 11-6and 11-8, Pyridium  and Increase fluid intake    History as above here for ER follow up  She was diagnosed with a small non obstructing kidney stone. She reports that she is still  Having nausea , lightheaded feeling and also flank pain. She is on ciprofloxacin for a presumed bladder infection except that the culture turned out to be negative. Patient reports that she is still having significant nausea and the Zofran is not working for her.     Problem list and histories reviewed & adjusted, as indicated.  Additional history: as documented    Patient Active Problem List   Diagnosis     Temporomandibular joint disorder     Congenital vascular nevus     Psoriasis     CARDIOVASCULAR SCREENING; LDL GOAL LESS THAN 130     GERD (gastroesophageal reflux disease)     Intermittent asthma     Sinus tachycardia     Genital warts     Migraine without status migrainosus, not intractable,  unspecified migraine type     KERRY (generalized anxiety disorder)     mirena IUD     Past Surgical History:   Procedure Laterality Date     MOUTH SURGERY      wisdom teeth       Social History   Substance Use Topics     Smoking status: Never Smoker     Smokeless tobacco: Never Used     Alcohol use 0.0 oz/week     0 Standard drinks or equivalent per week      Comment: occas-quit with pregnancy     Family History   Problem Relation Age of Onset     C.A.D. Maternal Grandmother      Asthma Maternal Grandmother      HEART DISEASE Maternal Grandmother      pacemaker     Cancer - colorectal Paternal Grandmother      colon     Cerebrovascular Disease Paternal Grandmother      Coronary Artery Disease Paternal Grandfather      Asthma Sister      GASTROINTESTINAL DISEASE Sister      celiac         Current Outpatient Prescriptions   Medication Sig Dispense Refill     ciprofloxacin (CIPRO) 500 MG tablet Take 1 tablet (500 mg) by mouth 2 times daily for 7 days 14 tablet 0     ondansetron (ZOFRAN ODT) 4 MG ODT tab Take 1 tablet (4 mg) by mouth every 6 hours as needed for nausea 10 tablet 0     oxyCODONE IR (ROXICODONE) 5 MG tablet Take 1 tablet (5 mg) by mouth every 6 hours as needed for pain 8 tablet 0     phenazopyridine (PYRIDIUM) 200 MG tablet Take 1 tablet (200 mg) by mouth 3 times daily as needed for irritation 6 tablet      prochlorperazine (COMPAZINE) 10 MG tablet Take 1 tablet (10 mg) by mouth every 6 hours as needed for nausea or vomiting 20 tablet 1     cyclobenzaprine (FLEXERIL) 5 MG tablet Take 1 tablet (5 mg) by mouth 2 times daily as needed for muscle spasms (Patient not taking: Reported on 11/12/2018) 30 tablet 0     IBUPROFEN PO Take 200-400 mg by mouth every 6 hours as needed for moderate pain       tiZANidine (ZANAFLEX) 2 MG tablet Take 1 tablet (2 mg) by mouth 3 times daily as needed for muscle spasms (Patient not taking: Reported on 11/12/2018) 30 tablet 0     Allergies   Allergen Reactions     Sertraline Other  "(See Comments)     Headache, Migraines     Tramadol Other (See Comments)     Mood swings     Zoloft [Serotonin Reuptake Inhibitors] Other (See Comments)     Migraine Headaches     Methylergonovine Anxiety     BP Readings from Last 3 Encounters:   11/12/18 100/76   11/08/18 138/90   11/06/18 115/81    Wt Readings from Last 3 Encounters:   11/12/18 163 lb (73.9 kg)   11/06/18 160 lb (72.6 kg)   10/26/18 163 lb (73.9 kg)                  Labs reviewed in EPIC    Reviewed and updated as needed this visit by clinical staff  Tobacco  Allergies  Meds  Med Hx  Surg Hx  Fam Hx  Soc Hx      Reviewed and updated as needed this visit by Provider         ROS:  Constitutional, HEENT, cardiovascular, pulmonary, gi and gu systems are negative, except as otherwise noted.    OBJECTIVE:     /76  Pulse 107  Temp 98.8  F (37.1  C) (Tympanic)  Resp 14  Ht 5' 2\" (1.575 m)  Wt 163 lb (73.9 kg)  SpO2 99%  BMI 29.81 kg/m2  Body mass index is 29.81 kg/(m^2).  GENERAL: healthy, alert and no distress  EYES: Eyes grossly normal to inspection, PERRL and conjunctivae and sclerae normal  HENT: ear canals and TM's normal, nose and mouth without ulcers or lesions  NECK: no adenopathy, no asymmetry, masses, or scars and thyroid normal to palpation  RESP: lungs clear to auscultation - no rales, rhonchi or wheezes  CV: regular rate and rhythm, normal S1 S2, no S3 or S4, no murmur, click or rub, no peripheral edema and peripheral pulses strong  ABDOMEN: tenderness suprapubic and bowel sounds normal  MS: no gross musculoskeletal defects noted, no edema  BACK: +ve CVA tenderness, no paralumbar tenderness    Diagnostic Test Results:  Results for orders placed or performed in visit on 11/12/18 (from the past 24 hour(s))   UA reflex to Microscopic and Culture   Result Value Ref Range    Color Urine Yellow     Appearance Urine Slightly Cloudy     Glucose Urine Negative NEG^Negative mg/dL    Bilirubin Urine Negative NEG^Negative    Ketones " Urine Negative NEG^Negative mg/dL    Specific Gravity Urine 1.020 1.003 - 1.035    Blood Urine Negative NEG^Negative    pH Urine 7.0 5.0 - 7.0 pH    Protein Albumin Urine Negative NEG^Negative mg/dL    Urobilinogen Urine 0.2 0.2 - 1.0 EU/dL    Nitrite Urine Negative NEG^Negative    Leukocyte Esterase Urine Negative NEG^Negative    Source Midstream Urine    Urine Microscopic   Result Value Ref Range    WBC Urine 0 - 5 OTO5^0 - 5 /HPF    RBC Urine O - 2 OTO2^O - 2 /HPF    Squamous Epithelial /LPF Urine Many (A) FEW^Few /LPF    Bacteria Urine Moderate (A) NEG^Negative /HPF       ASSESSMENT/PLAN:         1. Flank pain  UA negative for infection today. Patient was informed of the culture report.   - UA reflex to Microscopic and Culture  - Urine Microscopic    2. Calculus of left kidney  Referred to urology   Discussed CT results with the radiology and the stone is about 3.3 mm . It is non obstructing and should not be causing as much pain that patient says that she is in. I have placed a referral to the urology for further evaluation.   - UROLOGY ADULT REFERRAL    3. Nausea  Says Zofran not helping   - prochlorperazine (COMPAZINE) 10 MG tablet; Take 1 tablet (10 mg) by mouth every 6 hours as needed for nausea or vomiting  Dispense: 20 tablet; Refill: 1    FUTURE APPOINTMENTS:       - Follow-up visit as needed  Patient Instructions         Thank you for choosing [facility].  You may be receiving a survey in the mail from [employer] regarding your visit today.  Please take a few minutes to complete and return the survey to let us know how we are doing.      If you have questions or concerns, please contact us via [employer] or you can contact your care team at [phone].    Our Clinic hours are:  Monday 6:40 am  to 7:00 pm  Tuesday -Friday 6:40 am to 5:00 pm    The [place] outpatient lab hours are:  Monday - Friday 6:10 am to 4:45 pm  Saturdays 7:00 am to 11:00 am  Appointments are required, call [phone]    If  you have clinical questions after hours or would like to schedule an appointment,  call the clinic at [phone].  Kidney Stone with Pain    The sharp cramping pain on either side of your lower back and nausea/vomiting that you have are because of a small stone that has formed in the kidney. It is now passing down a narrow tube (ureter) on its way to your bladder. Once the stone reaches your bladder, the pain will often stop. But it may come back as the stone continues to pass out of the bladder and through the urethra. The stone may pass in your urine stream in one piece. The size may be 1/16 inch to 1/4 inch (1 mm to 6 mm). Or, the stone may break up into any fragments that you may not even notice.  Once you have had a kidney stone, you are at risk of getting another one in the future. There are 4 types of kidney stones. Eighty percent are calcium stones--mostly calcium oxalate but also some with calcium phosphate. The other 3 types include uric acid stones, struvite stones (from a preceding infection), and rarely, cystine stones.  Most stones will pass on their own, but may take from a few hours to a few days. Sometimes the stone is too large to pass by itself. In that case, the healthcare provider will need to use other ways to remove the stone. These techniques include:    Lithotripsy. This uses ultrasound waves to break up the stone.    Ureteroscopy. This pushes a basket-like instrument through the urethra and bladder and into the ureter to pull out the stone.    Various types of direct surgery through the skin  Home care  The following are general care guidelines:    Drink plenty of fluids. This means at least 12, 8-ounce glasses of fluid--mostly water--a day.    Each time you urinate, do so in a jar. Pour the urine from the jar through the strainer and into the toilet. Continue doing this until 24 hours after your pain stops. By then, if there was a kidney stone, it should pass from your bladder. Some  stones dissolve into sand-like particles and pass right through the strainer. In that case, you won t ever see a stone.    Save any stone that you find in the strainer and bring it to your healthcare provider to look at. It may be possible to stop certain types of stones from forming. For this reason, it is important to know what kind of stone you have.    Try to stay as active as possible. This will help the stone pass. Don't stay in bed unless your pain keeps you from getting up. You may notice a red, pink, or brown color to your urine. This is normal while passing a kidney stone.    If you develop pain, you may take ibuprofen or naproxen for pain, unless another medicine was prescribed. If you have chronic liver or kidney disease, talk with your healthcare provider before taking these medicines. Also talk with your provider if you've had a stomach ulcer or GI bleeding.  Preventing stones  Each year for the next 5 to 7 years, you are at risk that a new stone will form. Your risk is a 50% chance over this time period. The risk is higher if you have a family history of kidney stones or have certain chronic illnesses like hypertension, obesity, or diabetes. But you can make changes to your lifestyle and diet that can lower your risk for another stone.  Most kidney stones are made of calcium. The following is advice for preventing another calcium stone. If you don t know the type of stone you have, follow this advice until the cause of your stone is found.  Things that help:    The most important thing you can do is to drink plenty of fluids each day. See home care above.     Eat foods that contain phytates. These include wheat, rice, rye, barley, and beans. Phytates are substances that may lower your risk for any type of stone to form.    Eat more fruits and vegetables. Choose those that are high in potassium.    Eat foods high in natural citrate like fruit and low-sugar fruit juices.    Having too little calcium in  your diet can put you at risk for calcium kidney stones. Eat a normal amount of calcium in your diet and talk with your healthcare provider if you are taking calcium supplements. Cutting back on your calcium intake may raise your risk. New research shows that eating calcium-rich and oxalate-rich foods together lowers your risk for stones by binding the minerals in the stomach and intestines before they can reach the kidneys.      Limit salt intake to 2 grams (1 teaspoon) per day. Use limited amounts when cooking, and don t add salt at the table. Processed and canned foods are usually high in salt.     Spinach, rhubarb, peanuts, cashews, almonds, grapefruit, and grapefruit juice are all high oxalate foods. You should limit how much of these you eat. Or eat them with calcium-rich foods. These include dairy products, dark leafy greens, soy products, and calcium-enriched foods.    Reducing the amount of animal meat and high protein foods in your diet may lower your risk for uric acid stones.    Avoid excess sugar (sucrose) and fructose (sweetener in many soft drinks) in your diet.     If you take vitamin C as a supplement, don't take more than 1,000 mg a day.    A dietitian or your healthcare provider can give you information about changes in your diet that will help prevent more kidney stones from forming.  Follow-up care  Follow up with your healthcare provider, or as advised, if the pain lasts more than 48 hours. Talk with your provider about urine and blood tests to find out the cause of your stone. If you had an X-ray, CT scan, or other diagnostic test, you will be told of any new findings that may affect your care.  Call 911  Call 911 if you have any of these:    Weakness, dizziness, or fainting  When to seek medical advice  Call your healthcare provider right away if any of these occur:    Pain that is not controlled by the medicine given    Repeated vomiting or unable to keep down fluids    Fever of 100.4 F  (38 C) or higher, or as directed by your healthcare provider    Passage of solid red or brown urine (can't see through it) or urine with lots of blood clots    Foul-smelling or cloudy urine    Unable to pass urine for 8 hours and increasing bladder pressure  Date Last Reviewed: 10/1/2016    1715-6298 [employer]. [address] [place] [address]. All rights reserved. This information is not intended as a substitute for professional medical care. Always follow your healthcare professional's instructions.            Tyrone Couch MD  [facility]

## 2018-11-12 NOTE — MR AVS SNAPSHOT
After Visit Summary   11/12/2018    Laura Huff    MRN: [id number]           Patient Information     Date Of Birth          1990        Visit Information        Provider Department      11/12/2018 11:00 AM Tyrone Couch MD [facility]        Today's Diagnoses     Flank pain    -  1    Calculus of left kidney        Nausea          Care Instructions          Thank you for choosing [facility].  You may be receiving a survey in the mail from [facility] regarding your visit today.  Please take a few minutes to complete and return the survey to let us know how we are doing.      If you have questions or concerns, please contact us via [employer] or you can contact your care team at [phone].    Our Clinic hours are:  Monday 6:40 am  to 7:00 pm  Tuesday -Friday 6:40 am to 5:00 pm    The [place] outpatient lab hours are:  Monday - Friday 6:10 am to 4:45 pm  Saturdays 7:00 am to 11:00 am  Appointments are required, call [phone]    If you have clinical questions after hours or would like to schedule an appointment,  call the clinic at [phone].  Kidney Stone with Pain    The sharp cramping pain on either side of your lower back and nausea/vomiting that you have are because of a small stone that has formed in the kidney. It is now passing down a narrow tube (ureter) on its way to your bladder. Once the stone reaches your bladder, the pain will often stop. But it may come back as the stone continues to pass out of the bladder and through the urethra. The stone may pass in your urine stream in one piece. The size may be 1/16 inch to 1/4 inch (1 mm to 6 mm). Or, the stone may break up into any fragments that you may not even notice.  Once you have had a kidney stone, you are at risk of getting another one in the future. There are 4 types of kidney stones. Eighty percent are calcium stones--mostly calcium oxalate but also some with calcium phosphate. The other 3  types include uric acid stones, struvite stones (from a preceding infection), and rarely, cystine stones.  Most stones will pass on their own, but may take from a few hours to a few days. Sometimes the stone is too large to pass by itself. In that case, the healthcare provider will need to use other ways to remove the stone. These techniques include:    Lithotripsy. This uses ultrasound waves to break up the stone.    Ureteroscopy. This pushes a basket-like instrument through the urethra and bladder and into the ureter to pull out the stone.    Various types of direct surgery through the skin  Home care  The following are general care guidelines:    Drink plenty of fluids. This means at least 12, 8-ounce glasses of fluid--mostly water--a day.    Each time you urinate, do so in a jar. Pour the urine from the jar through the strainer and into the toilet. Continue doing this until 24 hours after your pain stops. By then, if there was a kidney stone, it should pass from your bladder. Some stones dissolve into sand-like particles and pass right through the strainer. In that case, you won t ever see a stone.    Save any stone that you find in the strainer and bring it to your healthcare provider to look at. It may be possible to stop certain types of stones from forming. For this reason, it is important to know what kind of stone you have.    Try to stay as active as possible. This will help the stone pass. Don't stay in bed unless your pain keeps you from getting up. You may notice a red, pink, or brown color to your urine. This is normal while passing a kidney stone.    If you develop pain, you may take ibuprofen or naproxen for pain, unless another medicine was prescribed. If you have chronic liver or kidney disease, talk with your healthcare provider before taking these medicines. Also talk with your provider if you've had a stomach ulcer or GI bleeding.  Preventing stones  Each year for the next 5 to 7 years, you are  at risk that a new stone will form. Your risk is a 50% chance over this time period. The risk is higher if you have a family history of kidney stones or have certain chronic illnesses like hypertension, obesity, or diabetes. But you can make changes to your lifestyle and diet that can lower your risk for another stone.  Most kidney stones are made of calcium. The following is advice for preventing another calcium stone. If you don t know the type of stone you have, follow this advice until the cause of your stone is found.  Things that help:    The most important thing you can do is to drink plenty of fluids each day. See home care above.     Eat foods that contain phytates. These include wheat, rice, rye, barley, and beans. Phytates are substances that may lower your risk for any type of stone to form.    Eat more fruits and vegetables. Choose those that are high in potassium.    Eat foods high in natural citrate like fruit and low-sugar fruit juices.    Having too little calcium in your diet can put you at risk for calcium kidney stones. Eat a normal amount of calcium in your diet and talk with your healthcare provider if you are taking calcium supplements. Cutting back on your calcium intake may raise your risk. New research shows that eating calcium-rich and oxalate-rich foods together lowers your risk for stones by binding the minerals in the stomach and intestines before they can reach the kidneys.      Limit salt intake to 2 grams (1 teaspoon) per day. Use limited amounts when cooking, and don t add salt at the table. Processed and canned foods are usually high in salt.     Spinach, rhubarb, peanuts, cashews, almonds, grapefruit, and grapefruit juice are all high oxalate foods. You should limit how much of these you eat. Or eat them with calcium-rich foods. These include dairy products, dark leafy greens, soy products, and calcium-enriched foods.    Reducing the amount of animal meat and high protein foods in  your diet may lower your risk for uric acid stones.    Avoid excess sugar (sucrose) and fructose (sweetener in many soft drinks) in your diet.     If you take vitamin C as a supplement, don't take more than 1,000 mg a day.    A dietitian or your healthcare provider can give you information about changes in your diet that will help prevent more kidney stones from forming.  Follow-up care  Follow up with your healthcare provider, or as advised, if the pain lasts more than 48 hours. Talk with your provider about urine and blood tests to find out the cause of your stone. If you had an X-ray, CT scan, or other diagnostic test, you will be told of any new findings that may affect your care.  Call 911  Call 911 if you have any of these:    Weakness, dizziness, or fainting  When to seek medical advice  Call your healthcare provider right away if any of these occur:    Pain that is not controlled by the medicine given    Repeated vomiting or unable to keep down fluids    Fever of 100.4 F (38 C) or higher, or as directed by your healthcare provider    Passage of solid red or brown urine (can't see through it) or urine with lots of blood clots    Foul-smelling or cloudy urine    Unable to pass urine for 8 hours and increasing bladder pressure  Date Last Reviewed: 10/1/2016    4334-9263 [employer]. [address] [place] [address]. All rights reserved. This information is not intended as a substitute for professional medical care. Always follow your healthcare professional's instructions.                Follow-ups after your visit        Additional Services     UROLOGY ADULT REFERRAL       Your provider has referred you to: EFFIE: [facility] [phone]   [email or web address]    Please be aware that coverage of these services is subject to the terms and limitations of your health insurance plan.  Call  "member services at your health plan with any benefit or coverage questions.      Please bring the following with you to your appointment:    (1) Any X-Rays, CTs or MRIs which have been performed.  Contact the facility where they were done to arrange for  prior to your scheduled appointment.    (2) List of current medications  (3) This referral request   (4) Any documents/labs given to you for this referral                  Follow-up notes from your care team     Return in about 1 week (around 11/19/2018).      Who to contact     If you have questions or need follow up information about today's clinic visit or your schedule please contact [facility] directly at [phone].  Normal or non-critical lab and imaging results will be communicated to you by MyChart, letter or phone within 4 business days after the clinic has received the results. If you do not hear from us within 7 days, please contact the clinic through [employer] or phone. If you have a critical or abnormal lab result, we will notify you by phone as soon as possible.  Submit refill requests through [employer] or call your pharmacy and they will forward the refill request to us. Please allow 3 business days for your refill to be completed.          Additional Information About Your Visit        [employer] Information     [employer] gives you secure access to your electronic health record. If you see a primary care provider, you can also send messages to your care team and make appointments. If you have questions, please call your primary care clinic.  If you do not have a primary care provider, please call [phone] and they will assist you.        Care EveryWhere ID     This is your Care EveryWhere ID. This could be used by other organizations to access your [place] medical records  [id number]        Your Vitals Were     Pulse Temperature Respirations Height Pulse Oximetry BMI (Body Mass Index)    107 98.8  F (37.1  C) (Tympanic) 14 5' 2\" " (1.575 m) 99% 29.81 kg/m2       Blood Pressure from Last 3 Encounters:   11/12/18 100/76   11/08/18 138/90   11/06/18 115/81    Weight from Last 3 Encounters:   11/12/18 163 lb (73.9 kg)   11/06/18 160 lb (72.6 kg)   10/26/18 163 lb (73.9 kg)              We Performed the Following     UA reflex to Microscopic and Culture     UROLOGY ADULT REFERRAL          Today's Medication Changes          These changes are accurate as of 11/12/18 11:26 AM.  If you have any questions, ask your nurse or doctor.               Start taking these medicines.        Dose/Directions    prochlorperazine 10 MG tablet   Commonly known as:  COMPAZINE   Used for:  Nausea   Started by:  Tyrone Couch MD        Dose:  10 mg   Take 1 tablet (10 mg) by mouth every 6 hours as needed for nausea or vomiting   Quantity:  20 tablet   Refills:  1         Stop taking these medicines if you haven't already. Please contact your care team if you have questions.     albuterol 108 (90 Base) MCG/ACT inhaler   Commonly known as:  PROAIR HFA/PROVENTIL HFA/VENTOLIN HFA   Stopped by:  Tyrone Couch MD                Where to get your medicines      These medications were sent to [facility] [address]   [address] [facility] [address]     Phone:  [phone]     prochlorperazine 10 MG tablet                Primary Care Provider Office Phone # Fax #    [facility] [phone] [address] [facility] [address]        Equal Access to Services     GEE MI AH: Tami Aj, mani luwilbert, qachirag kaalamy moore. So [facility] [phone].    ATENCIÓN: Si habla español, tiene a sparks disposición servicios gratuitos de asistencia lingüística. Margarita duncan [phone].    We comply with applicable federal civil rights laws and [place] laws. We do not discriminate on the basis of race, color, national  origin, age, disability, sex, sexual orientation, or gender identity.            Thank you!     Thank you for choosing [facility]  for your care. Our goal is always to provide you with excellent care. Hearing back from our patients is one way we can continue to improve our services. Please take a few minutes to complete the written survey that you may receive in the mail after your visit with us. Thank you!             Your Updated Medication List - Protect others around you: Learn how to safely use, store and throw away your medicines at www.disposemymeds.org.          This list is accurate as of 11/12/18 11:26 AM.  Always use your most recent med list.                   Brand Name Dispense Instructions for use Diagnosis    ciprofloxacin 500 MG tablet    CIPRO    14 tablet    Take 1 tablet (500 mg) by mouth 2 times daily for 7 days        cyclobenzaprine 5 MG tablet    FLEXERIL    30 tablet    Take 1 tablet (5 mg) by mouth 2 times daily as needed for muscle spasms    Cervicalgia       IBUPROFEN PO      Take 200-400 mg by mouth every 6 hours as needed for moderate pain        oxyCODONE IR 5 MG tablet    ROXICODONE    8 tablet    Take 1 tablet (5 mg) by mouth every 6 hours as needed for pain        prochlorperazine 10 MG tablet    COMPAZINE    20 tablet    Take 1 tablet (10 mg) by mouth every 6 hours as needed for nausea or vomiting    Nausea       PYRIDIUM 200 MG tablet   Generic drug:  phenazopyridine     6 tablet    Take 1 tablet (200 mg) by mouth 3 times daily as needed for irritation        tiZANidine 2 MG tablet    ZANAFLEX    30 tablet    Take 1 tablet (2 mg) by mouth 3 times daily as needed for muscle spasms        ZOFRAN ODT 4 MG ODT tab   Generic drug:  ondansetron     10 tablet    Take 1 tablet (4 mg) by mouth every 6 hours as needed for nausea

## 2018-11-12 NOTE — PATIENT INSTRUCTIONS
Thank you for choosing [facility].  You may be receiving a survey in the mail from [facility] regarding your visit today.  Please take a few minutes to complete and return the survey to let us know how we are doing.      If you have questions or concerns, please contact us via [employer] or you can contact your care team at [phone].    Our Clinic hours are:  Monday 6:40 am  to 7:00 pm  Tuesday -Friday 6:40 am to 5:00 pm    The [place] outpatient lab hours are:  Monday - Friday 6:10 am to 4:45 pm  Saturdays 7:00 am to 11:00 am  Appointments are required, call [phone]    If you have clinical questions after hours or would like to schedule an appointment,  call the clinic at [phone].  Kidney Stone with Pain    The sharp cramping pain on either side of your lower back and nausea/vomiting that you have are because of a small stone that has formed in the kidney. It is now passing down a narrow tube (ureter) on its way to your bladder. Once the stone reaches your bladder, the pain will often stop. But it may come back as the stone continues to pass out of the bladder and through the urethra. The stone may pass in your urine stream in one piece. The size may be 1/16 inch to 1/4 inch (1 mm to 6 mm). Or, the stone may break up into any fragments that you may not even notice.  Once you have had a kidney stone, you are at risk of getting another one in the future. There are 4 types of kidney stones. Eighty percent are calcium stones--mostly calcium oxalate but also some with calcium phosphate. The other 3 types include uric acid stones, struvite stones (from a preceding infection), and rarely, cystine stones.  Most stones will pass on their own, but may take from a few hours to a few days. Sometimes the stone is too large to pass by itself. In that case, the healthcare provider will need to use other ways to remove the stone. These techniques include:    Lithotripsy. This uses ultrasound waves to  break up the stone.    Ureteroscopy. This pushes a basket-like instrument through the urethra and bladder and into the ureter to pull out the stone.    Various types of direct surgery through the skin  Home care  The following are general care guidelines:    Drink plenty of fluids. This means at least 12, 8-ounce glasses of fluid--mostly water--a day.    Each time you urinate, do so in a jar. Pour the urine from the jar through the strainer and into the toilet. Continue doing this until 24 hours after your pain stops. By then, if there was a kidney stone, it should pass from your bladder. Some stones dissolve into sand-like particles and pass right through the strainer. In that case, you won t ever see a stone.    Save any stone that you find in the strainer and bring it to your healthcare provider to look at. It may be possible to stop certain types of stones from forming. For this reason, it is important to know what kind of stone you have.    Try to stay as active as possible. This will help the stone pass. Don't stay in bed unless your pain keeps you from getting up. You may notice a red, pink, or brown color to your urine. This is normal while passing a kidney stone.    If you develop pain, you may take ibuprofen or naproxen for pain, unless another medicine was prescribed. If you have chronic liver or kidney disease, talk with your healthcare provider before taking these medicines. Also talk with your provider if you've had a stomach ulcer or GI bleeding.  Preventing stones  Each year for the next 5 to 7 years, you are at risk that a new stone will form. Your risk is a 50% chance over this time period. The risk is higher if you have a family history of kidney stones or have certain chronic illnesses like hypertension, obesity, or diabetes. But you can make changes to your lifestyle and diet that can lower your risk for another stone.  Most kidney stones are made of calcium. The following is advice for  preventing another calcium stone. If you don t know the type of stone you have, follow this advice until the cause of your stone is found.  Things that help:    The most important thing you can do is to drink plenty of fluids each day. See home care above.     Eat foods that contain phytates. These include wheat, rice, rye, barley, and beans. Phytates are substances that may lower your risk for any type of stone to form.    Eat more fruits and vegetables. Choose those that are high in potassium.    Eat foods high in natural citrate like fruit and low-sugar fruit juices.    Having too little calcium in your diet can put you at risk for calcium kidney stones. Eat a normal amount of calcium in your diet and talk with your healthcare provider if you are taking calcium supplements. Cutting back on your calcium intake may raise your risk. New research shows that eating calcium-rich and oxalate-rich foods together lowers your risk for stones by binding the minerals in the stomach and intestines before they can reach the kidneys.      Limit salt intake to 2 grams (1 teaspoon) per day. Use limited amounts when cooking, and don t add salt at the table. Processed and canned foods are usually high in salt.     Spinach, rhubarb, peanuts, cashews, almonds, grapefruit, and grapefruit juice are all high oxalate foods. You should limit how much of these you eat. Or eat them with calcium-rich foods. These include dairy products, dark leafy greens, soy products, and calcium-enriched foods.    Reducing the amount of animal meat and high protein foods in your diet may lower your risk for uric acid stones.    Avoid excess sugar (sucrose) and fructose (sweetener in many soft drinks) in your diet.     If you take vitamin C as a supplement, don't take more than 1,000 mg a day.    A dietitian or your healthcare provider can give you information about changes in your diet that will help prevent more kidney stones from forming.  Follow-up  care  Follow up with your healthcare provider, or as advised, if the pain lasts more than 48 hours. Talk with your provider about urine and blood tests to find out the cause of your stone. If you had an X-ray, CT scan, or other diagnostic test, you will be told of any new findings that may affect your care.  Call 911  Call 911 if you have any of these:    Weakness, dizziness, or fainting  When to seek medical advice  Call your healthcare provider right away if any of these occur:    Pain that is not controlled by the medicine given    Repeated vomiting or unable to keep down fluids    Fever of 100.4 F (38 C) or higher, or as directed by your healthcare provider    Passage of solid red or brown urine (can't see through it) or urine with lots of blood clots    Foul-smelling or cloudy urine    Unable to pass urine for 8 hours and increasing bladder pressure  Date Last Reviewed: 10/1/2016    0716-1693 [employer]. [address] [place] [address], [place] [address]. All rights reserved. This information is not intended as a substitute for professional medical care. Always follow your healthcare professional's instructions.

## 2018-11-13 NOTE — TELEPHONE ENCOUNTER
Received a medication refill request from [facility] for cyclobenzaprine for patient. LVM for patient requesting information on if this medication is still needed. Form is in Dr Lopez's box in [place] awaiting signature for approval once word is received from the patient.  Willian Kelly, ATC

## 2018-11-15 ENCOUNTER — OFFICE VISIT (OUTPATIENT)
Dept: UROLOGY | Facility: CLINIC | Age: 28
End: 2018-11-15
Payer: COMMERCIAL

## 2018-11-15 VITALS — RESPIRATION RATE: 16 BRPM | DIASTOLIC BLOOD PRESSURE: 39 MMHG | HEART RATE: 92 BPM | SYSTOLIC BLOOD PRESSURE: 139 MMHG

## 2018-11-15 DIAGNOSIS — N20.0 CALCULUS OF KIDNEY: Primary | ICD-10-CM

## 2018-11-15 PROCEDURE — 99203 OFFICE O/P NEW LOW 30 MIN: CPT | Performed by: UROLOGY

## 2018-11-15 NOTE — NURSING NOTE
"Initial BP (!) 139/39 (BP Location: Right arm, Patient Position: Chair, Cuff Size: Adult Regular)  Pulse 92  Resp 16 Estimated body mass index is 29.81 kg/(m^2) as calculated from the following:    Height as of 11/12/18: 1.575 m (5' 2\").    Weight as of 11/12/18: 73.9 kg (163 lb). .    Patient is here for kidney stone.  agnes turcios LPN    "

## 2018-11-16 NOTE — PROGRESS NOTES
Visit Date:   11/15/2018      REASON FOR VISIT TODAY:  Kidney stone.      HISTORY:  Mrs. Grewal is a 27-year-old woman who presented to the emergency room on 11/6/2018 with some right-sided abdominal and back pain that eventually radiated to both sides of her lower back and abdomen.  She had some burning with urination at that time and some urgency as well.  The patient had some nausea with these episodes and some emesis, but denies any significant fevers.  A urinalysis was checked in the ER with greater than 182 white blood cells, greater than 182 red blood cells and only 3 squamous cells.  She was presumed to have a urinary tract infection and was started empirically on Cipro antibiotics.  She notes that the Cipro took away the dysuria, but that she persisted in having the back pain.  The patient re-presented to the emergency room on 11/08, and a urine culture was sent from that date, which has ultimately come back as negative.  The patient was seen in followup in her primary care on 11/12/2018, at which time a urinalysis was found to be negative.  The patient notes that again while the dysuria seems to be gone, she continues to have bilateral lower back and pelvic discomfort.  Of note, when the patient was in the Emergency Department on 11/06/2018, she underwent a CT scan that demonstrated a 3 x 3 mm left renal stone with no evidence of ureteral stones and no evidence of kidney stones on the right.      The patient did present in followup at her primary doctor's on 11/12/15.  Again, a urinalysis at that time was normal.  The patient notes that she does not have a history of recurrent UTIs, but has had some occasionally.  She denies any previous episodes of kidney stones.  The patient notes that she did have some blood in the urine that she could see with her eyes during the period of time that she was having the dysuria, but none since starting the antibiotics.  The patient does admit to constipation and notes  that she oftentimes will go every other day, but it is not uncommon for her to go several days and even up to a week between bowel movements.      PAST MEDICAL HISTORY:  Significant for psoriasis, asthma, general anxiety disorder and TMJ.      PAST SURGICAL HISTORY:  Includes wisdom teeth.      MEDICATIONS:  Include Pyridium, Compazine, Zofran, Flexeril and oxycodone.      ALLERGIES:  SERTRALINE, TRAMADOL, ZOLOFT, METHYLERGONOVINE.      FAMILY HISTORY:  Positive for a mother with kidney stones.      SOCIAL HISTORY:  Negative for tobacco and alcohol.      REVIEW OF SYSTEMS:  As noted as above.      PHYSICAL EXAMINATION:   VITAL:  On exam, her blood pressure is 139/39, pulse 92.   GENERAL:  She is in no acute distress.  She is here with her toddler son.   ABDOMEN:  Soft with some mild left-sided tenderness.  No flank tenderness noted on percussion, but there was some bilateral lower back tenderness as well.      IMAGING:  The patient's CT scan is as noted above.        LABORATORY DATA:  Urinalyses and urine culture are as noted above.  She has a creatinine of 0.7 on 11/08/2018.  White blood cell count was 6.9 on 11/08/2018.      ASSESSMENT AND PLAN:  Over half of today's 30-minute visit was spent counseling the patient regarding her abdominal pain and kidney stone.  I suggested to Mrs. Grewal that while she does have a kidney stone up in the kidney, it is not causing obstruction and we do not think this has anything to do with her pain.  We discussed that kidney stones typically are only symptomatic when they cause obstruction.  I suggested to the patient that my best guess in terms of her pain was likely severe constipation given her history of going multiple days and even up to a week between bowel movements, and the fact that a large amount of stool can be seen on the patient's CT scan from 11/06/2018.  I, therefore, counseled the patient that I would suggest she get a laxative in the form of magnesium citrate and  drink a third to half of a bottle to flush the system out.  I suspect this will go a long way toward improving her discomfort.  Further, I counseled the patient that for patients who form stones, the goal urine output would be 2 to 2-1/2 liters a day of urine output, which would require drinking copious amounts of fluid.  The increase in her fluid consumption would also significantly improve her constipation in all likelihood as well.  We did discuss that the patient's kidney stone has a roughly 50% chance over the next 5 years of dropping into the ureter and causing obstruction.  However, given the overall small size of the stone, she would have over a 90% chance of passing the stone without needing intervention.  I suggested to the patient that we see her back in 1 year's time with a simple KUB to make sure that the stone that she has is not getting bigger in the meantime.  Mrs. Pendleton is in agreement with the plan.  She will try the bowel cleanout and again, we will see her back in a year with a KUB.         GIOVANNA ALLEN MD             D: 11/15/2018   T: 2018   MT: HANNAH      Name:     ROB PENDLETON   MRN:      [id number]        Account:      [id number]   :      1990           Visit Date:   11/15/2018      Document: [id number]

## 2018-12-03 ENCOUNTER — OFFICE VISIT (OUTPATIENT)
Dept: FAMILY MEDICINE | Facility: CLINIC | Age: 28
End: 2018-12-03
Payer: COMMERCIAL

## 2018-12-03 VITALS
DIASTOLIC BLOOD PRESSURE: 88 MMHG | HEIGHT: 62 IN | WEIGHT: 162 LBS | SYSTOLIC BLOOD PRESSURE: 124 MMHG | OXYGEN SATURATION: 99 % | BODY MASS INDEX: 29.81 KG/M2 | TEMPERATURE: 98.8 F | HEART RATE: 72 BPM

## 2018-12-03 DIAGNOSIS — R10.2 VAGINAL PAIN: ICD-10-CM

## 2018-12-03 DIAGNOSIS — K59.09 CHRONIC CONSTIPATION: ICD-10-CM

## 2018-12-03 DIAGNOSIS — R19.7 DIARRHEA, UNSPECIFIED TYPE: ICD-10-CM

## 2018-12-03 DIAGNOSIS — N76.0 BV (BACTERIAL VAGINOSIS): Primary | ICD-10-CM

## 2018-12-03 DIAGNOSIS — B96.89 BV (BACTERIAL VAGINOSIS): Primary | ICD-10-CM

## 2018-12-03 LAB
SPECIMEN SOURCE: ABNORMAL
WET PREP SPEC: ABNORMAL

## 2018-12-03 PROCEDURE — 87210 SMEAR WET MOUNT SALINE/INK: CPT | Performed by: NURSE PRACTITIONER

## 2018-12-03 PROCEDURE — 99214 OFFICE O/P EST MOD 30 MIN: CPT | Performed by: NURSE PRACTITIONER

## 2018-12-03 PROCEDURE — 87491 CHLMYD TRACH DNA AMP PROBE: CPT | Performed by: NURSE PRACTITIONER

## 2018-12-03 PROCEDURE — 87591 N.GONORRHOEAE DNA AMP PROB: CPT | Performed by: NURSE PRACTITIONER

## 2018-12-03 RX ORDER — METRONIDAZOLE 500 MG/1
500 TABLET ORAL 2 TIMES DAILY
Qty: 14 TABLET | Refills: 0 | Status: SHIPPED | OUTPATIENT
Start: 2018-12-03 | End: 2019-03-27

## 2018-12-03 NOTE — PATIENT INSTRUCTIONS
Thank you for choosing [facility].  You may be receiving a survey in the mail from Anastasiia Avery regarding your visit today.  Please take a few minutes to complete and return the survey to let us know how we are doing.      If you have questions or concerns, please contact us via [employer] or you can contact your care team at [phone].    Our Clinic hours are:  Monday 6:40 am  to 7:00 pm  Tuesday -Friday 6:40 am to 5:00 pm    The [place] outpatient lab hours are:  Monday - Friday 6:10 am to 4:45 pm  Saturdays 7:00 am to 11:00 am  Appointments are required, call [phone]    If you have clinical questions after hours or would like to schedule an appointment,  call the clinic at [phone].

## 2018-12-03 NOTE — PROGRESS NOTES
"Chief Concern: Vaginal/pelvic pain    HPI  Laura is a 27 year old female who presents to clinic with concern for chronic episodes of severe, shooting vaginal pain radiating up into her abdomen. The pain began after the birth of her first child about 8 years ago, and usually occur about monthly. She is now experiencing the pain weekly-monthly, and she is unable to predict when the pain will occur. She denies dyspareunia, abnormal vaginal discharge, external genital symptoms, or new sexual partner. She has had a Mirena IUD since 2017 and wants the IUD out because it is uncomfortable and she has had significant weight gain after insertion. She has not been menstruating with the IUD. She was recently treated with antibiotics for a UTI and seen in the ED for gastroenteritis. She has been having frequent, watery diarrhea for over a week. No changes in urinary pattern.    PMH  Mild major depression  Anxiety  Recent UTI    PSH  [place] teeth extraction    Allergies  Sertraline: Headache, migraines  Tramadol: Mood Swings  Methylergonovine: Anxiety    Medications  None    Health Maintenance  Reviewed, Up to date    Immunizations  Declined Influenza vaccine    LMP: Mirena IUD  -No new sexual partners, monogamous relationship with a male partner  -Does not wish to become pregnant at this time      ROS  General: Denies fever, chills, malaise  GI: Multiple loose, watery stools per day, denies nausea or vomiting  /Female: Denies abnormal vaginal discharge or bleeding, denies pain with intercourse. Intermittent vaginal/pelvic pain. No external genitalia pain, itching, or burning    PE  /90 HR 72 T 98.8 SpO2 99%  Ht 5'2\" Wt 162 lb BMI 29.63    Diagnostics  Wet Prep: + clue cells  Chlamydia: Pending  Gonorrhea: Pending    General: Alert, no acute distress  GI: Abdomen round, soft, mildly tender in bilateral lower quadrants on deep palpation. No hepatosplenomegaly, no masses, no CVA tenderness  /Female: " External genitalia without erythema, edema, or lesions. Vagina well-rugated, pink and moist. Cervix pink with white discharge. Uterus midline, adnexa and ovaries negative for masses or pain on palpation. Positive for cervical tenderness. IUD strings visualized.    Assessment/Plan  1. Vaginal Pain  -STI screening; results pending    2. Bacterial Vaginosis  -Start metronidazole 500 mg by mouth twice daily for 7 days    3. Mirena IUD Removal  -Successful removal by CIELO Guzman CNP  -Non-hormonal birth control discussed, patient does not wish to become pregnant    4. Diarrhea, unspecified  -C.Diff stool collection kit provided to patient due to recent antibiotic use  -Increase fluid intake to prevent dehydration    Karolina Omalley FNP [employer]

## 2018-12-03 NOTE — PROGRESS NOTES
"  SUBJECTIVE:   Laura Grewal is a 27 year old female who presents to clinic today for the following health issues:      Vaginal PAIN     Onset: off and on for 8 years    Description:   Character: Sharp pains that come and go - shoots straight up her vagina  Pain Lasts for a couple minutes then goes away; makes her double over in pain  Can't move      Intensity: severe    Progression of Symptoms:  same and intermittent    Accompanying Signs & Symptoms:  Fever/Chills?: no   Gas/Bloating: no   Nausea: no   Vomitting: no   Diarrhea?: for one week - numerous times per day - watery. Wondering if she has the stomach flu.   Had abx 1 month ago for kidney infection  Constipation: yes chronic issue, can go a whole week without having a BM- miralax and laxative not helpful - only took twice.  Dysuria or Hematuria: no   No pain during sexual intercourse.  No new partners.   History:   Trauma: no   Previous similar pain: no    Previous tests done: not for this issue  Had a CT scan due to blood in her urine and flank pain in November  Has a kidney stone in her left kidney - not obstructing    Precipitating factors:   Does the pain change with:     Food: no      BM: no     Urination: no     Alleviating factors:  nothing    Therapies Tried and outcome: nothing    LMP:  IUD           Problem list and histories reviewed & adjusted, as indicated.  Additional history: as documented      Reviewed and updated as needed this visit by clinical staff  Tobacco  Allergies  Meds  Med Hx  Surg Hx  Fam Hx  Soc Hx      Reviewed and updated as needed this visit by Provider  Allergies         ROS:  Constitutional, HEENT, cardiovascular, pulmonary, gi and gu systems are negative, except as otherwise noted.    OBJECTIVE:     /88  Pulse 72  Temp 98.8  F (37.1  C) (Tympanic)  Ht 5' 2\" (1.575 m)  Wt 162 lb (73.5 kg)  SpO2 99%  BMI 29.63 kg/m2  Body mass index is 29.63 kg/(m^2).  GENERAL: healthy, alert and no distress  NECK: no " adenopathy, no asymmetry, masses, or scars and thyroid normal to palpation  RESP: lungs clear to auscultation - no rales, rhonchi or wheezes  CV: regular rate and rhythm, normal S1 S2, no S3 or S4, no murmur, click or rub, no peripheral edema and peripheral pulses strong  ABDOMEN: soft, nontender, no hepatosplenomegaly, no masses and bowel sounds normal   (female): normal female external genitalia, normal urethral meatus, vaginal mucosa, normal cervix/adnexa/uterus without masses or discharge  MS: no gross musculoskeletal defects noted, no edema    Diagnostic Test Results:  Results for orders placed or performed in visit on 12/03/18 (from the past 24 hour(s))   Wet prep   Result Value Ref Range    Specimen Description Vagina     Wet Prep No yeast seen     Wet Prep Clue cells seen (A)     Wet Prep No Trichomonas seen        ASSESSMENT/PLAN:       ICD-10-CM    1. BV (bacterial vaginosis) N76.0 metroNIDAZOLE (FLAGYL) 500 MG tablet BID for 7 days    B96.89    2. Vaginal pain R10.2 Wet prep + for BV  Pending labs:     NEISSERIA GONORRHOEA PCR     CHLAMYDIA TRACHOMATIS PCR    Possibly related to chronic constipation.    Will treat BV and chronic constipation. If still an issue, follow up in clinic.     3. Diarrhea, unspecified type R19.7 Clostridium difficile Toxin B PCR - one week of watery diarrhea; abx use last month.     4. Chronic constipation. See below.      Bacterial Vaginosis:  This is a common inflammation of the vagina caused by bacteria.    It appears to be an overgrowth of several types of bacteria normally located in the vagina.  It is not thought to be a sexually transmitted process and partners do not need to be treated.      The most common symptoms include:  Gray or yellowish discharge, odor (often fishy), itching, irritation, sometimes pelvic discomfort.      This is treated with metronidazole - topical once nightly X 5 days or oral 500 mg twice daily X 7 days.   Do not drink any alcohol while taking  either of these medications.   Avoid intercourse until your symptoms have resolved.    Return to clinic if symptoms do not resolve or worsen despite treatment.      For constipation:   1. Consume at least 8 glasses of water per day   2. Exercise for at least 30 minutes every day. Regular exercise helps to keep your digestive system active and and healthy and may help with constipation.   3. Take advantage of opportunities - you are more like to have a bowel movement after waking and after eating. Do not postpone having a bowel movement if you feel the urge to go.  4. Increase dietary fiber. Goal of 25 grams per day for women, 35 grams per day for men. If unable to consume 25-35 grams through diet alone consider OTC supplements. Metamucil is the best choice. It requires the use of plenty of water to be effective. Can take days to weeks to take effect.  5. Prunes can be just as effective as Metamucil. 10 prunes = 6 grams of fiber.  6. Recommend taking Miralax (17 grams = 1 scoop). Take once daily, can mix with anything. If you experience increasing bowel movements and diarrhea, decrease to every other day or every 3rd day. Miralax is an osmotic laxative that increases the amount of water secreted by the intestines resulting in softer and easier to pass stools. It can take 1-4 days to work. It may be taken chronically if you drink enough water throughout the day.  7. If Miralax isn't enough, recommend stimulant laxative such as Senna, Ex Lax or Dulcolax. Stimulant laxatives speed up the colonic motility of your gut helping to induce a bowel movement. Take as needed at bedtime.  8. If you are still having difficulties with constipation may also add a stool softener to your bowel regimen such a Docusate.       The risks, benefits and treatment options of prescribed medications or other treatments have been discussed with the patient. The patient verbalized their understanding and should call or follow up if no  improvement or if they develop further problems.    CIELO [facility]

## 2018-12-03 NOTE — MR AVS SNAPSHOT
After Visit Summary   12/3/2018    Laura Huff    MRN: [id number]           Patient Information     Date Of Birth          1990        Visit Information        Provider Department      12/3/2018 2:40 PM Alissa Robbins APRN CNP [facility]        Today's Diagnoses     BV (bacterial vaginosis)    -  1    Vaginal pain        Diarrhea, unspecified type        Chronic constipation          Care Instructions          Thank you for choosing [facility].  You may be receiving a survey in the mail from Anastasiia Avery regarding your visit today.  Please take a few minutes to complete and return the survey to let us know how we are doing.      If you have questions or concerns, please contact us via [employer] or you can contact your care team at [phone].    Our Clinic hours are:  Monday 6:40 am  to 7:00 pm  Tuesday -Friday 6:40 am to 5:00 pm    The [place] outpatient lab hours are:  Monday - Friday 6:10 am to 4:45 pm  Saturdays 7:00 am to 11:00 am  Appointments are required, call [phone]    If you have clinical questions after hours or would like to schedule an appointment,  call the clinic at [phone].            Follow-ups after your visit        Follow-up notes from your care team     Return in about 4 weeks (around 12/31/2018), or if symptoms worsen or fail to improve.      Future tests that were ordered for you today     Open Future Orders        Priority Expected Expires Ordered    Clostridium difficile Toxin B PCR Routine  1/2/2019 12/3/2018            Who to contact     If you have questions or need follow up information about today's clinic visit or your schedule please contact [facility] directly at [phone].  Normal or non-critical lab and imaging results will be communicated to you by MyChart, letter or phone within 4 business days after the clinic has received the results. If you do not hear from us within 7 days, please contact  "the clinic through [employer] or phone. If you have a critical or abnormal lab result, we will notify you by phone as soon as possible.  Submit refill requests through [employer] or call your pharmacy and they will forward the refill request to us. Please allow 3 business days for your refill to be completed.          Additional Information About Your Visit        [facility] [employer] Information     [employer] gives you secure access to your electronic health record. If you see a primary care provider, you can also send messages to your care team and make appointments. If you have questions, please call your primary care clinic.  If you do not have a primary care provider, please call [phone] and they will assist you.        Care EveryWhere ID     This is your Care EveryWhere ID. This could be used by other organizations to access your [facility] medical records  [id number]        Your Vitals Were     Pulse Temperature Height Pulse Oximetry BMI (Body Mass Index)       72 98.8  F (37.1  C) (Tympanic) 5' 2\" (1.575 m) 99% 29.63 kg/m2        Blood Pressure from Last 3 Encounters:   12/03/18 124/88   11/15/18 (!) 139/39   11/12/18 100/76    Weight from Last 3 Encounters:   12/03/18 162 lb (73.5 kg)   11/12/18 163 lb (73.9 kg)   11/06/18 160 lb (72.6 kg)              We Performed the Following     CHLAMYDIA TRACHOMATIS PCR     NEISSERIA GONORRHOEA PCR     Wet prep          Today's Medication Changes          These changes are accurate as of 12/3/18  6:05 PM.  If you have any questions, ask your nurse or doctor.               Start taking these medicines.        Dose/Directions    metroNIDAZOLE 500 MG tablet   Commonly known as:  FLAGYL   Used for:  BV (bacterial vaginosis)   Started by:  Alissa Robbins APRN CNP        Dose:  500 mg   Take 1 tablet (500 mg) by mouth 2 times daily   Quantity:  14 tablet   Refills:  0            Where to get your medicines      These medications were sent to [facility] [address] [place] [address] " [place], [place] - [address]  [address] [facility] [address]     Phone:  [phone]     metroNIDAZOLE 500 MG tablet                Primary Care Provider Office Phone # Fax #    [facility] [phone] [address] [facility] [address]        Equal Access to Services     MELISA MI : Hadii aad ku hadasho Soomaali, waaxda luqadaha, qaybta kaalmada adeegyada, waxay idiin hayaan adeeg kharash larogelio . So [facility] [phone].    ATENCIÓN: Si habla español, tiene a sparks disposición servicios gratuitos de asistencia lingüística. Margarita al [phone].    We comply with applicable federal civil rights laws and [place] laws. We do not discriminate on the basis of race, color, national origin, age, disability, sex, sexual orientation, or gender identity.            Thank you!     Thank you for choosing [facility]  for your care. Our goal is always to provide you with excellent care. Hearing back from our patients is one way we can continue to improve our services. Please take a few minutes to complete the written survey that you may receive in the mail after your visit with us. Thank you!             Your Updated Medication List - Protect others around you: Learn how to safely use, store and throw away your medicines at www.disposemymeds.org.          This list is accurate as of 12/3/18  6:05 PM.  Always use your most recent med list.                   Brand Name Dispense Instructions for use Diagnosis    cyclobenzaprine 5 MG tablet    FLEXERIL    30 tablet    Take 1 tablet (5 mg) by mouth 2 times daily as needed for muscle spasms    Cervicalgia       IBUPROFEN PO      Take 200-400 mg by mouth every 6 hours as needed for moderate pain        metroNIDAZOLE 500 MG tablet    FLAGYL    14 tablet    Take 1 tablet (500 mg) by mouth 2 times daily    BV (bacterial vaginosis)       oxyCODONE 5 MG tablet    ROXICODONE    8 tablet    Take 1 tablet (5 mg) by mouth every 6  hours as needed for pain        prochlorperazine 10 MG tablet    COMPAZINE    20 tablet    Take 1 tablet (10 mg) by mouth every 6 hours as needed for nausea or vomiting    Nausea       PYRIDIUM 200 MG tablet   Generic drug:  phenazopyridine     6 tablet    Take 1 tablet (200 mg) by mouth 3 times daily as needed for irritation        tiZANidine 2 MG tablet    ZANAFLEX    30 tablet    Take 1 tablet (2 mg) by mouth 3 times daily as needed for muscle spasms        ZOFRAN ODT 4 MG ODT tab   Generic drug:  ondansetron     10 tablet    Take 1 tablet (4 mg) by mouth every 6 hours as needed for nausea

## 2018-12-05 LAB
C TRACH DNA SPEC QL NAA+PROBE: NEGATIVE
N GONORRHOEA DNA SPEC QL NAA+PROBE: NEGATIVE
SPECIMEN SOURCE: NORMAL
SPECIMEN SOURCE: NORMAL

## 2019-02-03 ENCOUNTER — HOSPITAL ENCOUNTER (EMERGENCY)
Facility: CLINIC | Age: 29
Discharge: HOME | End: 2019-02-03
Attending: EMERGENCY MEDICINE | Admitting: EMERGENCY MEDICINE
Payer: COMMERCIAL

## 2019-02-03 VITALS
TEMPERATURE: 99.5 F | SYSTOLIC BLOOD PRESSURE: 119 MMHG | HEART RATE: 125 BPM | DIASTOLIC BLOOD PRESSURE: 75 MMHG | OXYGEN SATURATION: 94 %

## 2019-02-03 DIAGNOSIS — R10.9 STOMACH ACHE: ICD-10-CM

## 2019-02-03 DIAGNOSIS — J11.1 INFLUENZA-LIKE ILLNESS: ICD-10-CM

## 2019-02-03 DIAGNOSIS — J11.1 FLU: ICD-10-CM

## 2019-02-03 LAB
ALBUMIN UR-MCNC: NEGATIVE MG/DL
ANION GAP SERPL CALCULATED.3IONS-SCNC: 8 MMOL/L (ref 3–14)
APPEARANCE UR: CLEAR
BASOPHILS # BLD AUTO: 0 10E9/L (ref 0–0.2)
BASOPHILS NFR BLD AUTO: 0.3 %
BILIRUB UR QL STRIP: NEGATIVE
BUN SERPL-MCNC: 13 MG/DL (ref 7–30)
CALCIUM SERPL-MCNC: 8.6 MG/DL (ref 8.5–10.1)
CHLORIDE SERPL-SCNC: 102 MMOL/L (ref 94–109)
CO2 SERPL-SCNC: 25 MMOL/L (ref 20–32)
COLOR UR AUTO: YELLOW
CREAT SERPL-MCNC: 0.76 MG/DL (ref 0.52–1.04)
DIFFERENTIAL METHOD BLD: ABNORMAL
EOSINOPHIL # BLD AUTO: 0 10E9/L (ref 0–0.7)
EOSINOPHIL NFR BLD AUTO: 0.2 %
ERYTHROCYTE [DISTWIDTH] IN BLOOD BY AUTOMATED COUNT: 13.2 % (ref 10–15)
FLUAV+FLUBV AG SPEC QL: NEGATIVE
FLUAV+FLUBV AG SPEC QL: NEGATIVE
GFR SERPL CREATININE-BSD FRML MDRD: >90 ML/MIN/{1.73_M2}
GLUCOSE SERPL-MCNC: 99 MG/DL (ref 70–99)
GLUCOSE UR STRIP-MCNC: NEGATIVE MG/DL
HCG UR QL: NEGATIVE
HCT VFR BLD AUTO: 41.4 % (ref 35–47)
HGB BLD-MCNC: 13.9 G/DL (ref 11.7–15.7)
HGB UR QL STRIP: NEGATIVE
IMM GRANULOCYTES # BLD: 0.1 10E9/L (ref 0–0.4)
IMM GRANULOCYTES NFR BLD: 0.4 %
KETONES UR STRIP-MCNC: NEGATIVE MG/DL
LACTATE BLD-SCNC: 1.5 MMOL/L (ref 0.7–2)
LEUKOCYTE ESTERASE UR QL STRIP: NEGATIVE
LYMPHOCYTES # BLD AUTO: 1.8 10E9/L (ref 0.8–5.3)
LYMPHOCYTES NFR BLD AUTO: 12.2 %
MCH RBC QN AUTO: 29 PG (ref 26.5–33)
MCHC RBC AUTO-ENTMCNC: 33.6 G/DL (ref 31.5–36.5)
MCV RBC AUTO: 86 FL (ref 78–100)
MONOCYTES # BLD AUTO: 0.8 10E9/L (ref 0–1.3)
MONOCYTES NFR BLD AUTO: 5.5 %
NEUTROPHILS # BLD AUTO: 12.2 10E9/L (ref 1.6–8.3)
NEUTROPHILS NFR BLD AUTO: 81.4 %
NITRATE UR QL: NEGATIVE
NRBC # BLD AUTO: 0 10*3/UL
NRBC BLD AUTO-RTO: 0 /100
PH UR STRIP: 8 PH (ref 5–7)
PLATELET # BLD AUTO: 365 10E9/L (ref 150–450)
POTASSIUM SERPL-SCNC: 3.7 MMOL/L (ref 3.4–5.3)
RBC # BLD AUTO: 4.79 10E12/L (ref 3.8–5.2)
SODIUM SERPL-SCNC: 135 MMOL/L (ref 133–144)
SOURCE: ABNORMAL
SP GR UR STRIP: 1.02 (ref 1–1.03)
SPECIMEN SOURCE: NORMAL
UROBILINOGEN UR STRIP-MCNC: 0 MG/DL (ref 0–2)
WBC # BLD AUTO: 15 10E9/L (ref 4–11)

## 2019-02-03 PROCEDURE — 25000132 ZZH RX MED GY IP 250 OP 250 PS 637: Performed by: EMERGENCY MEDICINE

## 2019-02-03 PROCEDURE — 81003 URINALYSIS AUTO W/O SCOPE: CPT | Performed by: EMERGENCY MEDICINE

## 2019-02-03 PROCEDURE — 80048 BASIC METABOLIC PNL TOTAL CA: CPT | Performed by: EMERGENCY MEDICINE

## 2019-02-03 PROCEDURE — 96374 THER/PROPH/DIAG INJ IV PUSH: CPT

## 2019-02-03 PROCEDURE — 99285 EMERGENCY DEPT VISIT HI MDM: CPT | Mod: 25

## 2019-02-03 PROCEDURE — 99285 EMERGENCY DEPT VISIT HI MDM: CPT | Mod: 25 | Performed by: EMERGENCY MEDICINE

## 2019-02-03 PROCEDURE — 83605 ASSAY OF LACTIC ACID: CPT | Performed by: EMERGENCY MEDICINE

## 2019-02-03 PROCEDURE — 25000128 H RX IP 250 OP 636: Performed by: EMERGENCY MEDICINE

## 2019-02-03 PROCEDURE — 96375 TX/PRO/DX INJ NEW DRUG ADDON: CPT

## 2019-02-03 PROCEDURE — 87804 INFLUENZA ASSAY W/OPTIC: CPT | Performed by: EMERGENCY MEDICINE

## 2019-02-03 PROCEDURE — 85025 COMPLETE CBC W/AUTO DIFF WBC: CPT | Performed by: EMERGENCY MEDICINE

## 2019-02-03 PROCEDURE — 96376 TX/PRO/DX INJ SAME DRUG ADON: CPT

## 2019-02-03 PROCEDURE — 81025 URINE PREGNANCY TEST: CPT | Performed by: EMERGENCY MEDICINE

## 2019-02-03 PROCEDURE — 76775 US EXAM ABDO BACK WALL LIM: CPT | Mod: 26 | Performed by: EMERGENCY MEDICINE

## 2019-02-03 PROCEDURE — 76775 US EXAM ABDO BACK WALL LIM: CPT

## 2019-02-03 PROCEDURE — 96361 HYDRATE IV INFUSION ADD-ON: CPT

## 2019-02-03 RX ORDER — KETOROLAC TROMETHAMINE 30 MG/ML
30 INJECTION, SOLUTION INTRAMUSCULAR; INTRAVENOUS ONCE
Status: COMPLETED | OUTPATIENT
Start: 2019-02-03 | End: 2019-02-03

## 2019-02-03 RX ORDER — ONDANSETRON 2 MG/ML
4 INJECTION INTRAMUSCULAR; INTRAVENOUS ONCE
Status: COMPLETED | OUTPATIENT
Start: 2019-02-03 | End: 2019-02-03

## 2019-02-03 RX ORDER — HYDROMORPHONE HYDROCHLORIDE 1 MG/ML
0.5 INJECTION, SOLUTION INTRAMUSCULAR; INTRAVENOUS; SUBCUTANEOUS
Status: DISCONTINUED | OUTPATIENT
Start: 2019-02-03 | End: 2019-02-04 | Stop reason: HOSPADM

## 2019-02-03 RX ORDER — ACETAMINOPHEN 500 MG
1000 TABLET ORAL ONCE
Status: COMPLETED | OUTPATIENT
Start: 2019-02-03 | End: 2019-02-03

## 2019-02-03 RX ADMIN — ACETAMINOPHEN 1000 MG: 500 TABLET, FILM COATED ORAL at 22:31

## 2019-02-03 RX ADMIN — SODIUM CHLORIDE, POTASSIUM CHLORIDE, SODIUM LACTATE AND CALCIUM CHLORIDE 1000 ML: 600; 310; 30; 20 INJECTION, SOLUTION INTRAVENOUS at 20:35

## 2019-02-03 RX ADMIN — HYDROMORPHONE HYDROCHLORIDE 0.5 MG: 1 INJECTION, SOLUTION INTRAMUSCULAR; INTRAVENOUS; SUBCUTANEOUS at 21:46

## 2019-02-03 RX ADMIN — HYDROMORPHONE HYDROCHLORIDE 0.5 MG: 1 INJECTION, SOLUTION INTRAMUSCULAR; INTRAVENOUS; SUBCUTANEOUS at 20:57

## 2019-02-03 RX ADMIN — ONDANSETRON 4 MG: 2 INJECTION INTRAMUSCULAR; INTRAVENOUS at 20:30

## 2019-02-03 RX ADMIN — KETOROLAC TROMETHAMINE 30 MG: 30 INJECTION, SOLUTION INTRAMUSCULAR at 20:28

## 2019-02-03 RX ADMIN — SODIUM CHLORIDE, POTASSIUM CHLORIDE, SODIUM LACTATE AND CALCIUM CHLORIDE 1000 ML: 600; 310; 30; 20 INJECTION, SOLUTION INTRAVENOUS at 21:48

## 2019-02-03 NOTE — ED AVS SNAPSHOT
[facility] Emergency Department  [address] [facility] [address]  Phone:  [phone]  Fax:  [phone]                                    Laura Grewal   MRN: [id number]    Department:  [facility] Emergency Department   Date of Visit:  2/3/2019           After Visit Summary Signature Page    I have received my discharge instructions, and my questions have been answered. I have discussed any challenges I see with this plan with the nurse or doctor.    ..........................................................................................................................................  Patient/Patient Representative Signature      ..........................................................................................................................................  Patient Representative Print Name and Relationship to Patient    ..................................................               ................................................  Date                                   Time    ..........................................................................................................................................  Reviewed by Signature/Title    ...................................................              ..............................................  Date                                               Time          22EPIC Rev 08/18

## 2019-02-04 ENCOUNTER — APPOINTMENT (OUTPATIENT)
Dept: CT IMAGING | Facility: CLINIC | Age: 29
End: 2019-02-04
Payer: COMMERCIAL

## 2019-02-04 ENCOUNTER — APPOINTMENT (OUTPATIENT)
Dept: ULTRASOUND IMAGING | Facility: CLINIC | Age: 29
End: 2019-02-04
Payer: COMMERCIAL

## 2019-02-04 ENCOUNTER — HOSPITAL ENCOUNTER (EMERGENCY)
Facility: CLINIC | Age: 29
Discharge: HOME | End: 2019-02-04
Attending: FAMILY MEDICINE | Admitting: FAMILY MEDICINE
Payer: COMMERCIAL

## 2019-02-04 VITALS
SYSTOLIC BLOOD PRESSURE: 115 MMHG | TEMPERATURE: 99.8 F | RESPIRATION RATE: 18 BRPM | DIASTOLIC BLOOD PRESSURE: 81 MMHG | OXYGEN SATURATION: 100 % | HEART RATE: 122 BPM

## 2019-02-04 DIAGNOSIS — J02.0 STREPTOCOCCAL PHARYNGITIS: ICD-10-CM

## 2019-02-04 LAB
ALBUMIN SERPL-MCNC: 3.5 G/DL (ref 3.4–5)
ALBUMIN UR-MCNC: NEGATIVE MG/DL
ALP SERPL-CCNC: 135 U/L (ref 40–150)
ALT SERPL W P-5'-P-CCNC: 57 U/L (ref 0–50)
ANION GAP SERPL CALCULATED.3IONS-SCNC: 7 MMOL/L (ref 3–14)
APPEARANCE UR: CLEAR
AST SERPL W P-5'-P-CCNC: 30 U/L (ref 0–45)
BASOPHILS # BLD AUTO: 0 10E9/L (ref 0–0.2)
BASOPHILS NFR BLD AUTO: 0.3 %
BILIRUB SERPL-MCNC: 0.4 MG/DL (ref 0.2–1.3)
BILIRUB UR QL STRIP: NEGATIVE
BUN SERPL-MCNC: 10 MG/DL (ref 7–30)
CALCIUM SERPL-MCNC: 8.3 MG/DL (ref 8.5–10.1)
CHLORIDE SERPL-SCNC: 105 MMOL/L (ref 94–109)
CO2 SERPL-SCNC: 23 MMOL/L (ref 20–32)
COLOR UR AUTO: YELLOW
CREAT SERPL-MCNC: 0.73 MG/DL (ref 0.52–1.04)
DEPRECATED S PYO AG THROAT QL EIA: ABNORMAL
DIFFERENTIAL METHOD BLD: ABNORMAL
EOSINOPHIL # BLD AUTO: 0 10E9/L (ref 0–0.7)
EOSINOPHIL NFR BLD AUTO: 0.2 %
ERYTHROCYTE [DISTWIDTH] IN BLOOD BY AUTOMATED COUNT: 13.3 % (ref 10–15)
GFR SERPL CREATININE-BSD FRML MDRD: >90 ML/MIN/{1.73_M2}
GLUCOSE SERPL-MCNC: 107 MG/DL (ref 70–99)
GLUCOSE UR STRIP-MCNC: NEGATIVE MG/DL
HCT VFR BLD AUTO: 40.4 % (ref 35–47)
HGB BLD-MCNC: 13.2 G/DL (ref 11.7–15.7)
HGB UR QL STRIP: ABNORMAL
IMM GRANULOCYTES # BLD: 0.2 10E9/L (ref 0–0.4)
IMM GRANULOCYTES NFR BLD: 1.1 %
KETONES UR STRIP-MCNC: NEGATIVE MG/DL
LACTATE BLD-SCNC: 1.9 MMOL/L (ref 0.7–2)
LEUKOCYTE ESTERASE UR QL STRIP: ABNORMAL
LIPASE SERPL-CCNC: 63 U/L (ref 73–393)
LYMPHOCYTES # BLD AUTO: 1.2 10E9/L (ref 0.8–5.3)
LYMPHOCYTES NFR BLD AUTO: 8.5 %
MCH RBC QN AUTO: 28.9 PG (ref 26.5–33)
MCHC RBC AUTO-ENTMCNC: 32.7 G/DL (ref 31.5–36.5)
MCV RBC AUTO: 88 FL (ref 78–100)
MONOCYTES # BLD AUTO: 0.9 10E9/L (ref 0–1.3)
MONOCYTES NFR BLD AUTO: 6 %
MUCOUS THREADS #/AREA URNS LPF: PRESENT /LPF
NEUTROPHILS # BLD AUTO: 12.2 10E9/L (ref 1.6–8.3)
NEUTROPHILS NFR BLD AUTO: 83.9 %
NITRATE UR QL: NEGATIVE
NRBC # BLD AUTO: 0 10*3/UL
NRBC BLD AUTO-RTO: 0 /100
PH UR STRIP: 8 PH (ref 5–7)
PLATELET # BLD AUTO: 240 10E9/L (ref 150–450)
POTASSIUM SERPL-SCNC: 3.6 MMOL/L (ref 3.4–5.3)
PROT SERPL-MCNC: 7.4 G/DL (ref 6.8–8.8)
RBC # BLD AUTO: 4.57 10E12/L (ref 3.8–5.2)
RBC #/AREA URNS AUTO: 3 /HPF (ref 0–2)
SODIUM SERPL-SCNC: 135 MMOL/L (ref 133–144)
SOURCE: ABNORMAL
SP GR UR STRIP: 1.01 (ref 1–1.03)
SPECIMEN SOURCE: ABNORMAL
SQUAMOUS #/AREA URNS AUTO: 1 /HPF (ref 0–1)
UROBILINOGEN UR STRIP-MCNC: 0 MG/DL (ref 0–2)
WBC # BLD AUTO: 14.6 10E9/L (ref 4–11)
WBC #/AREA URNS AUTO: 4 /HPF (ref 0–5)

## 2019-02-04 PROCEDURE — 81001 URINALYSIS AUTO W/SCOPE: CPT | Performed by: FAMILY MEDICINE

## 2019-02-04 PROCEDURE — 80053 COMPREHEN METABOLIC PANEL: CPT | Performed by: FAMILY MEDICINE

## 2019-02-04 PROCEDURE — 96374 THER/PROPH/DIAG INJ IV PUSH: CPT

## 2019-02-04 PROCEDURE — 99284 EMERGENCY DEPT VISIT MOD MDM: CPT | Mod: Z6 | Performed by: FAMILY MEDICINE

## 2019-02-04 PROCEDURE — 85025 COMPLETE CBC W/AUTO DIFF WBC: CPT | Performed by: FAMILY MEDICINE

## 2019-02-04 PROCEDURE — 96375 TX/PRO/DX INJ NEW DRUG ADDON: CPT

## 2019-02-04 PROCEDURE — 87880 STREP A ASSAY W/OPTIC: CPT | Performed by: FAMILY MEDICINE

## 2019-02-04 PROCEDURE — 93976 VASCULAR STUDY: CPT

## 2019-02-04 PROCEDURE — 25000128 H RX IP 250 OP 636: Performed by: FAMILY MEDICINE

## 2019-02-04 PROCEDURE — 25000125 ZZHC RX 250: Performed by: FAMILY MEDICINE

## 2019-02-04 PROCEDURE — 74177 CT ABD & PELVIS W/CONTRAST: CPT

## 2019-02-04 PROCEDURE — 83605 ASSAY OF LACTIC ACID: CPT | Performed by: FAMILY MEDICINE

## 2019-02-04 PROCEDURE — 96361 HYDRATE IV INFUSION ADD-ON: CPT

## 2019-02-04 PROCEDURE — 99285 EMERGENCY DEPT VISIT HI MDM: CPT | Mod: 25

## 2019-02-04 PROCEDURE — 83690 ASSAY OF LIPASE: CPT | Performed by: FAMILY MEDICINE

## 2019-02-04 RX ORDER — SODIUM CHLORIDE 9 MG/ML
1000 INJECTION, SOLUTION INTRAVENOUS CONTINUOUS
Status: DISCONTINUED | OUTPATIENT
Start: 2019-02-04 | End: 2019-02-04 | Stop reason: HOSPADM

## 2019-02-04 RX ORDER — IOPAMIDOL 755 MG/ML
79 INJECTION, SOLUTION INTRAVASCULAR ONCE
Status: COMPLETED | OUTPATIENT
Start: 2019-02-04 | End: 2019-02-04

## 2019-02-04 RX ORDER — PENICILLIN V POTASSIUM 500 MG/1
500 TABLET, FILM COATED ORAL 2 TIMES DAILY
Qty: 40 TABLET | Refills: 0 | Status: SHIPPED | OUTPATIENT
Start: 2019-02-04 | End: 2019-03-27

## 2019-02-04 RX ORDER — KETOROLAC TROMETHAMINE 30 MG/ML
30 INJECTION, SOLUTION INTRAMUSCULAR; INTRAVENOUS ONCE
Status: COMPLETED | OUTPATIENT
Start: 2019-02-04 | End: 2019-02-04

## 2019-02-04 RX ORDER — HYDROMORPHONE HYDROCHLORIDE 1 MG/ML
0.5 INJECTION, SOLUTION INTRAMUSCULAR; INTRAVENOUS; SUBCUTANEOUS ONCE
Status: COMPLETED | OUTPATIENT
Start: 2019-02-04 | End: 2019-02-04

## 2019-02-04 RX ORDER — ONDANSETRON 2 MG/ML
4 INJECTION INTRAMUSCULAR; INTRAVENOUS EVERY 30 MIN PRN
Status: DISCONTINUED | OUTPATIENT
Start: 2019-02-04 | End: 2019-02-04 | Stop reason: HOSPADM

## 2019-02-04 RX ADMIN — ONDANSETRON 4 MG: 2 INJECTION INTRAMUSCULAR; INTRAVENOUS at 08:25

## 2019-02-04 RX ADMIN — SODIUM CHLORIDE 1000 ML: 9 INJECTION, SOLUTION INTRAVENOUS at 08:25

## 2019-02-04 RX ADMIN — KETOROLAC TROMETHAMINE 30 MG: 30 INJECTION, SOLUTION INTRAMUSCULAR at 08:26

## 2019-02-04 RX ADMIN — HYDROMORPHONE HYDROCHLORIDE 0.5 MG: 1 INJECTION, SOLUTION INTRAMUSCULAR; INTRAVENOUS; SUBCUTANEOUS at 09:27

## 2019-02-04 RX ADMIN — IOPAMIDOL 79 ML: 755 INJECTION, SOLUTION INTRAVENOUS at 08:45

## 2019-02-04 RX ADMIN — SODIUM CHLORIDE 60 ML: 9 INJECTION, SOLUTION INTRAVENOUS at 08:46

## 2019-02-04 NOTE — ED NOTES
Patient states she has been vomiting since 0500. Was in the ED last night not feeling well. States she is worse.

## 2019-02-04 NOTE — DISCHARGE INSTRUCTIONS
I believe it is likely a virus that is causing your symptoms.  However please return to the emergency department if you have worsening symptoms, abdominal pain, repeated vomiting, lightheadedness, vaginal discharge, rash, or other concerns.  In the meantime take acetaminophen and ibuprofen as needed.  Drink plenty fluids.  Follow-up in clinic if not better in 3-4 days.

## 2019-02-04 NOTE — ED AVS SNAPSHOT
[facility] Emergency Department  [address] [address]  Phone:  [phone]  Fax:  [phone]                                    Laura Grewal   MRN: [id number]    Department:  [facility] Emergency Department   Date of Visit:  2/4/2019           After Visit Summary Signature Page    I have received my discharge instructions, and my questions have been answered. I have discussed any challenges I see with this plan with the nurse or doctor.    ..........................................................................................................................................  Patient/Patient Representative Signature      ..........................................................................................................................................  Patient Representative Print Name and Relationship to Patient    ..................................................               ................................................  Date                                   Time    ..........................................................................................................................................  Reviewed by Signature/Title    ...................................................              ..............................................  Date                                               Time          22EPIC Rev 08/18

## 2019-02-04 NOTE — ED NOTES
Pt developed sudden low back pain in flank area around 2:30 today.  No urinary symptoms.  Pt c/o chills but states she did not check her temp.  No known back injury.

## 2019-02-04 NOTE — ED NOTES
disch instructions reviewed with pt and spouse states understanding. Pt comfortable on disch, pt has  home.

## 2019-02-04 NOTE — ED PROVIDER NOTES
History     Chief Complaint   Patient presents with     Back Pain     hx of kidney stones     HPI  Laura Grewal is a 28 year old female who presents for back pain.  Pain started about 5 hours prior to arrival while sitting on the couch.  Pain localized to bilateral lower back, without radiation.  Described as sharp, rated as severe.  She does not have a history of trauma.  Has taken diphenhydramine for this pain.  She does not have a hx of chronic back pain; does report a history of kidney stones and feels this feels slightly similar.  She has had one episode of nonbloody nonbilious emesis.  She does not have bowel/bladder dysfunction, history of malignancy, history of IVDU, history of immunosuppression. She does not have headache, chest pain, shortness of breath, abdominal pain, diarrhea, or extremity weakness or paresthesias.  She thinks she has some mild dysuria but no urinary frequency.  No hematuria, vaginal bleeding, vaginal discharge.    Allergies:  Allergies   Allergen Reactions     Sertraline Other (See Comments)     Headache, Migraines     Tramadol Other (See Comments)     Mood swings     Zoloft [Serotonin Reuptake Inhibitors] Other (See Comments)     Migraine Headaches     Methylergonovine Anxiety       Problem List:    Patient Active Problem List    Diagnosis Date Noted     mirena IUD 03/23/2017     Priority: Medium     Mirena IUD placed today by Mago Chaidez MD  LOT# [id number]  Exp:09/19       Migraine without status migrainosus, not intractable, unspecified migraine type 06/09/2016     Priority: Medium     KERRY (generalized anxiety disorder) 06/09/2016     Priority: Medium     Did not tolerate Zoloft.  Currently trying lexapro       Genital warts 12/09/2015     Priority: Medium     Sinus tachycardia 01/14/2015     Priority: Medium     Intermittent asthma 05/27/2014     Priority: Medium     GERD (gastroesophageal reflux disease) 06/10/2013     Priority: Medium     Psoriasis 10/16/2008     Priority:  Medium     Has tried clindamycin, triamcinolone, and differin.  On nothing currently.       Congenital vascular nevus 05/24/2007     Priority: Medium     Right forehead       Temporomandibular joint disorder 03/20/2007     Priority: Medium     right side       CARDIOVASCULAR SCREENING; LDL GOAL LESS THAN 130 01/31/2012     Priority: Low        Past Medical History:    Past Medical History:   Diagnosis Date     Acute sinusitis, unspecified      Acute upper respiratory infections of unspecified site      Chickenpox      Depression with anxiety 5/19/2012     Mild major depression (H) 5/19/2012     Unspecified otitis media        Past Surgical History:    Past Surgical History:   Procedure Laterality Date     MOUTH SURGERY      wisdom teeth       Family History:    Family History   Problem Relation Age of Onset     C.A.D. Maternal Grandmother      Asthma Maternal Grandmother      Heart Disease Maternal Grandmother         pacemaker     Cancer - colorectal Paternal Grandmother         colon     Cerebrovascular Disease Paternal Grandmother      Coronary Artery Disease Paternal Grandfather      Asthma Sister      Gastrointestinal Disease Sister         celiac       Social History:  Marital Status:  Single [1]  Social History     Tobacco Use     Smoking status: Never Smoker     Smokeless tobacco: Never Used   Substance Use Topics     Alcohol use: Yes     Alcohol/week: 0.0 oz     Comment: very little     Drug use: No        Medications:      cyclobenzaprine (FLEXERIL) 5 MG tablet   IBUPROFEN PO   metroNIDAZOLE (FLAGYL) 500 MG tablet   ondansetron (ZOFRAN ODT) 4 MG ODT tab   oxyCODONE IR (ROXICODONE) 5 MG tablet   phenazopyridine (PYRIDIUM) 200 MG tablet   prochlorperazine (COMPAZINE) 10 MG tablet   tiZANidine (ZANAFLEX) 2 MG tablet         Review of Systems  Pertinent positives and negatives listed in the HPI, all other systems reviewed and are negative.    Physical Exam   BP: 113/79  Pulse: 130  Temp: 100.5  F (38.1   C)  SpO2: 100 %      Physical Exam   Constitutional: She is oriented to person, place, and time. She appears well-developed and well-nourished. She appears distressed.   HENT:   Head: Normocephalic and atraumatic.   Right Ear: External ear normal.   Left Ear: External ear normal.   Nose: Nose normal.   Eyes: Conjunctivae are normal. No scleral icterus.   Neck: Normal range of motion.   Cardiovascular: Normal rate and regular rhythm.   Pulmonary/Chest: Effort normal. No stridor. No respiratory distress.   Abdominal: Soft. She exhibits no distension. There is no tenderness. There is no rigidity, no rebound and no guarding.   Musculoskeletal:   Back: no deformity, erythema, warmth, or masses appreciated; tender over bilateral paraspinal muscles; no tenderness over midline, sacroiliac region; normal spine ROM; spine symmetric with normal curvature; normal ROM of hips and knees; intact LE sensation to light touch; normal LE strength; normal gait; patient able to rise onto toes and to squat; normal perianal sensation   Neurological: She is alert and oriented to person, place, and time.   Left lower extremity: 5/5 strength with hip flexion, hip extension, knee flexion, knee extension, dorsiflexion, and plantar flexion  Right lower extremity: 5/5 strength with hip flexion, hip extension, knee flexion, knee extension, dorsiflexion, and plantar flexion    Skin: Skin is warm and dry. She is not diaphoretic.   Psychiatric: She has a normal mood and affect. Her behavior is normal.   Nursing note and vitals reviewed.      ED Course        Procedures    Results for orders placed during the hospital encounter of 02/03/19   POC US RETROPERITONEAL LIMITED    Impression [facility] Procedure Note    Limited Bedside ED Renal Ultrasound:    PERFORMED BY: Dr. John Albert  INDICATIONS:  Flank Pain  PROBE: Low frequency convex probe  BODY LOCATION:  Abdomen  FINDINGS:  The ultrasound was performed with longitudinal and  transverse views.   Right Kidney:   Hydronephrosis:  None   Renal cyst:  None  Left Kidney:   Hydronephrosis:  None   Renal cyst:  None  INTERPRETATION:  The evaluation of the kidneys was normal without evidence of hydronephrosis or cysts.  IMAGE DOCUMENTATION: Images were archived to PACs system.              Critical Care time:  none               Results for orders placed or performed during the hospital encounter of 02/03/19 (from the past 24 hour(s))   POC US RETROPERITONEAL LIMITED    Impression    [facility] Procedure Note    Limited Bedside ED Renal Ultrasound:    PERFORMED BY: Dr. John Albert  INDICATIONS:  Flank Pain  PROBE: Low frequency convex probe  BODY LOCATION:  Abdomen  FINDINGS:  The ultrasound was performed with longitudinal and transverse views.   Right Kidney:   Hydronephrosis:  None   Renal cyst:  None  Left Kidney:   Hydronephrosis:  None   Renal cyst:  None  INTERPRETATION:  The evaluation of the kidneys was normal without evidence of hydronephrosis or cysts.  IMAGE DOCUMENTATION: Images were archived to PACs system.    Lactic acid whole blood   Result Value Ref Range    Lactic Acid 1.5 0.7 - 2.0 mmol/L   Basic metabolic panel   Result Value Ref Range    Sodium 135 133 - 144 mmol/L    Potassium 3.7 3.4 - 5.3 mmol/L    Chloride 102 94 - 109 mmol/L    Carbon Dioxide 25 20 - 32 mmol/L    Anion Gap 8 3 - 14 mmol/L    Glucose 99 70 - 99 mg/dL    Urea Nitrogen 13 7 - 30 mg/dL    Creatinine 0.76 0.52 - 1.04 mg/dL    GFR Estimate >90 >60 mL/min/[1.73_m2]    GFR Estimate If Black >90 >60 mL/min/[1.73_m2]    Calcium 8.6 8.5 - 10.1 mg/dL   CBC with platelets differential   Result Value Ref Range    WBC 15.0 (H) 4.0 - 11.0 10e9/L    RBC Count 4.79 3.8 - 5.2 10e12/L    Hemoglobin 13.9 11.7 - 15.7 g/dL    Hematocrit 41.4 35.0 - 47.0 %    MCV 86 78 - 100 fl    MCH 29.0 26.5 - 33.0 pg    MCHC 33.6 31.5 - 36.5 g/dL    RDW 13.2 10.0 - 15.0 %    Platelet Count 365 150 - 450 10e9/L    Diff Method  Automated Method     % Neutrophils 81.4 %    % Lymphocytes 12.2 %    % Monocytes 5.5 %    % Eosinophils 0.2 %    % Basophils 0.3 %    % Immature Granulocytes 0.4 %    Nucleated RBCs 0 0 /100    Absolute Neutrophil 12.2 (H) 1.6 - 8.3 10e9/L    Absolute Lymphocytes 1.8 0.8 - 5.3 10e9/L    Absolute Monocytes 0.8 0.0 - 1.3 10e9/L    Absolute Eosinophils 0.0 0.0 - 0.7 10e9/L    Absolute Basophils 0.0 0.0 - 0.2 10e9/L    Abs Immature Granulocytes 0.1 0 - 0.4 10e9/L    Absolute Nucleated RBC 0.0    UA reflex to Microscopic   Result Value Ref Range    Color Urine Yellow     Appearance Urine Clear     Glucose Urine Negative NEG^Negative mg/dL    Bilirubin Urine Negative NEG^Negative    Ketones Urine Negative NEG^Negative mg/dL    Specific Gravity Urine 1.016 1.003 - 1.035    Blood Urine Negative NEG^Negative    pH Urine 8.0 (H) 5.0 - 7.0 pH    Protein Albumin Urine Negative NEG^Negative mg/dL    Urobilinogen mg/dL 0.0 0.0 - 2.0 mg/dL    Nitrite Urine Negative NEG^Negative    Leukocyte Esterase Urine Negative NEG^Negative    Source Midstream Urine    HCG qualitative urine   Result Value Ref Range    HCG Qual Urine Negative NEG^Negative   Influenza A/B antigen   Result Value Ref Range    Influenza A/B Agn Specimen Nasopharyngeal     Influenza A Negative NEG^Negative    Influenza B Negative NEG^Negative       Medications   HYDROmorphone (PF) (DILAUDID) injection 0.5 mg (0.5 mg Intravenous Given 2/3/19 2146)   lactated ringers BOLUS 1,000 mL (0 mLs Intravenous Stopped 2/3/19 2146)   ketorolac (TORADOL) injection 30 mg (30 mg Intravenous Given 2/3/19 2028)   ondansetron (ZOFRAN) injection 4 mg (4 mg Intravenous Given 2/3/19 2030)   lactated ringers BOLUS 1,000 mL (1,000 mLs Intravenous New Bag 2/3/19 2148)   acetaminophen (TYLENOL) tablet 1,000 mg (1,000 mg Oral Given 2/3/19 2231)       Assessments & Plan (with Medical Decision Making)   28-year-old female who presents for bilateral back pain.  Heart 125, temperature is 100.5 F,  SPO2 is 93% on room air, blood pressure is 119/75.  Bedside ultrasound shows no hydronephrosis.  White blood cell count is elevated 15 point lactic acid is normal.  Electrolytes are within normal limits.  Urine pregnancy test is negative, unlikely ectopic pregnancy.  Urinalysis is negative for signs of blood or infection, less likely pyelonephritis or ureterolithiasis.  On recheck she is feeling better after receiving IV hydromorphone, ketorolac, fluids.  Influenza swab is negative.  At this point she is tolerating oral intake, ambulating, safe to discharge with instructions to return if she has worsening symptoms or other concerns, otherwise follow-up in clinic.  The patient is in agreement with this plan.    I have reviewed the nursing notes.    I have reviewed the findings, diagnosis, plan and need for follow up with the patient.          Medication List      ASK your doctor about these medications    ciprofloxacin 500 MG tablet  Commonly known as:  CIPRO  500 mg, Oral, 2 TIMES DAILY  Ask about: Should I take this medication?     phenazopyridine 200 MG tablet  Commonly known as:  PYRIDIUM  200 mg, Oral, 3 TIMES DAILY  Ask about: Should I take this medication?            Final diagnoses:   Influenza-like illness       2/3/2019   [facility] EMERGENCY DEPARTMENT     John Albert MD  02/03/19 [id number]

## 2019-02-04 NOTE — DISCHARGE INSTRUCTIONS
Return to the Emergency Room if the following occurs:     Severely worsened pain, dehydration, or for any concern at anytime.    Or, follow-up with the following provider as we discussed:     Return to your primary doctor as needed, or if not improved over the next 5 days.    Medications discussed:    Ibuprofen 600 mg every six hours for pain (7 days duration).  Tylenol 1000 mg every six hours for pain (7 days duration).  Therefore, you can alternate these every three hours and do it safely.  Penicillin.    If you received pain-relieving or sedating medication during your time in the ER, avoid alcohol, driving automobiles, or working with machinery.  Also, a responsible adult must stay with you.        Call the Nurse Advice Line at [phone] or [phone] for any concern at anytime.

## 2019-02-04 NOTE — ED PROVIDER NOTES
HPI  Current medications, past medical history, and social history are reviewed.    The patient is a 28-year-old female presenting with persistent/worsened low back pain and nausea with vomiting.  She was seen in the ED last evening.  She had blood work, urine analysis, a bedside ultrasound looking at the retroperitoneum, and a UPT.  These were unremarkable.  This morning at about 5:00 AM she began to throw up.  She has thrown up 5 or 6 times total.  No hematochezia or melena.  She describes persistent and worsened low back pain.  She also endorses some mild pelvic pain and tenderness.  Her back pain started yesterday.  She denies vaginal discharge or irritation.  No vaginal bleeding.  She is sexually active with one partner who is out of town frequently.  He has not been around recently and so they have not had sex for weeks.  Mild dysuria described.  No hematuria, urgency, or frequency.  No trauma or injury.  No diarrhea.  No constipation.  No hematochezia or melena.  She has not had pain like this previously.    ROS: All other review of systems are negative other than that noted above.     Past Medical History:   Diagnosis Date     Acute sinusitis, unspecified      Acute upper respiratory infections of unspecified site      Chickenpox      Depression with anxiety 5/19/2012     Mild major depression (H) 5/19/2012     Unspecified otitis media      Past Surgical History:   Procedure Laterality Date     MOUTH SURGERY      wisdom teeth     Medicines    penicillin V (VEETID) 500 MG tablet   cyclobenzaprine (FLEXERIL) 5 MG tablet   IBUPROFEN PO   metroNIDAZOLE (FLAGYL) 500 MG tablet   ondansetron (ZOFRAN ODT) 4 MG ODT tab   oxyCODONE IR (ROXICODONE) 5 MG tablet   phenazopyridine (PYRIDIUM) 200 MG tablet   prochlorperazine (COMPAZINE) 10 MG tablet   tiZANidine (ZANAFLEX) 2 MG tablet     Family History   Problem Relation Age of Onset     C.A.D. Maternal Grandmother      Asthma Maternal Grandmother      Heart Disease  Maternal Grandmother         pacemaker     Cancer - colorectal Paternal Grandmother         colon     Cerebrovascular Disease Paternal Grandmother      Coronary Artery Disease Paternal Grandfather      Asthma Sister      Gastrointestinal Disease Sister         celiac     Social History     Tobacco Use     Smoking status: Never Smoker     Smokeless tobacco: Never Used   Substance Use Topics     Alcohol use: Yes     Alcohol/week: 0.0 oz     Comment: very little     Drug use: No         PHYSICAL  /81   Pulse 122   Temp 99.8  F (37.7  C) (Oral)   Resp 18   SpO2 100%   General: Patient is alert and in moderate to severe distress.  Tearful, uncomfortable.  Neurological: Alert.  Moving upper and lower extremities equally, bilaterally.  Head / Neck: Atraumatic.  Ears: Not done.  Eyes: Pupils are equal, round, and reactive.  Normal conjunctiva.  Nose: Midline.  No epistaxis.  Mouth / Throat: No ulcerations or lesions.  Upper pharynx is not erythematous.  Moist.  Respiratory: No respiratory distress. CTA B.  Cardiovascular: Regular rhythm.  Peripheral extremities are warm.  No edema.  No calf tenderness.  Abdomen / Pelvis: Tender in the midline pelvis.  No distention.  Soft throughout.  Genitalia: Not done.  Musculoskeletal: No tenderness over major muscles and joints.  Skin: No evidence of rash or trauma.        ED COURSE  0813.  The patient has low back pain and pelvic tenderness.  She is nauseous with vomiting.  Her white blood cell count was elevated yesterday.  The rest of her workup was unremarkable.  A CT scan with IV contrast is ordered.  Toradol, Zofran, fluid bolus ordered.    Labs Ordered and Resulted from Time of ED Arrival Up to the Time of Departure from the ED   COMPREHENSIVE METABOLIC PANEL - Abnormal; Notable for the following components:       Result Value    Glucose 107 (*)     Calcium 8.3 (*)     ALT 57 (*)     All other components within normal limits   CBC WITH PLATELETS DIFFERENTIAL -  Abnormal; Notable for the following components:    WBC 14.6 (*)     Absolute Neutrophil 12.2 (*)     All other components within normal limits   URINE MACROSCOPIC WITH REFLEX TO MICRO - Abnormal; Notable for the following components:    Blood Urine Small (*)     pH Urine 8.0 (*)     Leukocyte Esterase Urine Trace (*)     RBC Urine 3 (*)     Mucous Urine Present (*)     All other components within normal limits   LIPASE - Abnormal; Notable for the following components:    Lipase 63 (*)     All other components within normal limits   RAPID STREP SCREEN - Abnormal; Notable for the following components:    Rapid Strep A Screen   (*)     Value: POSITIVE: Group A Streptococcal antigen detected by immunoassay.    All other components within normal limits   LACTIC ACID WHOLE BLOOD   NEISSERIA GONORRHOEAE PCR   CHLAMYDIA TRACHOMATIS PCR   WET PREP     IMAGING  Images reviewed by me.  Radiology report also reviewed.  Pelvic Ultrasound (US) with doppler imaging (r/o ovarian torsion)   Final Result   IMPRESSION:   Normal pelvic ultrasound.      LISBET BERTRAND MD      Abd/pelvis CT, IV contrast only TRAUMA  / AAA   Final Result   IMPRESSION: No acute abnormality in the abdomen or pelvis. No cause   for pain demonstrated.      LISBET BERTRAND MD        6672.  The patient continues to have severe pain.  Dilaudid 0.5 mg ordered.  Lab values thus far are fairly unremarkable.  Her white blood cell count continues to be elevated but her CT scan is unremarkable for acute change.  Formal pelvic ultrasound ordered.  Wet prep ordered.  STD testing ordered.    1136.  The patient has some sore throat and an enlarged lymph node on the left that she is aware of now.  Strep test ordered.    1212.  Strep test is positive.  The rest of her workup here has been unremarkable.  She will be treated for strep pharyngitis.  She is electing to have penicillin 500 mg twice a day for 10 days.  Follow-up discussed.  Return for worsening as  discussed.    Medications   0.9% sodium chloride BOLUS (0 mLs Intravenous Stopped 2/4/19 1023)     Followed by   sodium chloride 0.9% infusion (not administered)   ondansetron (ZOFRAN) injection 4 mg (4 mg Intravenous Given 2/4/19 0825)   ketorolac (TORADOL) injection 30 mg (30 mg Intravenous Given 2/4/19 0826)   iopamidol (ISOVUE-370) solution 79 mL (79 mLs Intravenous Given 2/4/19 0845)   sodium chloride 0.9 % bag 500mL for CT scan flush use (60 mLs Intravenous Given 2/4/19 0846)   HYDROmorphone (PF) (DILAUDID) injection 0.5 mg (0.5 mg Intravenous Given 2/4/19 0927)       IMPRESSION    ICD-10-CM    1. Streptococcal pharyngitis J02.0          Critical Care time:  none                    Jonathan Urban MD  02/04/19 2426

## 2019-03-27 ENCOUNTER — OFFICE VISIT (OUTPATIENT)
Dept: FAMILY MEDICINE | Facility: CLINIC | Age: 29
End: 2019-03-27
Payer: COMMERCIAL

## 2019-03-27 VITALS
BODY MASS INDEX: 29.41 KG/M2 | DIASTOLIC BLOOD PRESSURE: 78 MMHG | HEIGHT: 63 IN | SYSTOLIC BLOOD PRESSURE: 110 MMHG | TEMPERATURE: 98.5 F | OXYGEN SATURATION: 98 % | RESPIRATION RATE: 14 BRPM | HEART RATE: 90 BPM | WEIGHT: 166 LBS

## 2019-03-27 DIAGNOSIS — G47.00 INSOMNIA, UNSPECIFIED TYPE: ICD-10-CM

## 2019-03-27 DIAGNOSIS — G43.719 INTRACTABLE CHRONIC MIGRAINE WITHOUT AURA AND WITHOUT STATUS MIGRAINOSUS: Primary | ICD-10-CM

## 2019-03-27 PROCEDURE — 99214 OFFICE O/P EST MOD 30 MIN: CPT | Performed by: FAMILY MEDICINE

## 2019-03-27 RX ORDER — TRAZODONE HYDROCHLORIDE 50 MG/1
50 TABLET, FILM COATED ORAL
Qty: 30 TABLET | Refills: 1 | Status: SHIPPED | OUTPATIENT
Start: 2019-03-27 | End: 2019-04-17

## 2019-03-27 RX ORDER — TOPIRAMATE 25 MG/1
25 TABLET, FILM COATED ORAL 2 TIMES DAILY
Qty: 60 TABLET | Refills: 1 | Status: SHIPPED | OUTPATIENT
Start: 2019-03-27 | End: 2019-05-13

## 2019-03-27 ASSESSMENT — ANXIETY QUESTIONNAIRES
5. BEING SO RESTLESS THAT IT IS HARD TO SIT STILL: SEVERAL DAYS
6. BECOMING EASILY ANNOYED OR IRRITABLE: NEARLY EVERY DAY
7. FEELING AFRAID AS IF SOMETHING AWFUL MIGHT HAPPEN: NOT AT ALL
1. FEELING NERVOUS, ANXIOUS, OR ON EDGE: NOT AT ALL
3. WORRYING TOO MUCH ABOUT DIFFERENT THINGS: MORE THAN HALF THE DAYS
2. NOT BEING ABLE TO STOP OR CONTROL WORRYING: MORE THAN HALF THE DAYS

## 2019-03-27 ASSESSMENT — MIFFLIN-ST. JEOR: SCORE: 1461.06

## 2019-03-27 ASSESSMENT — PATIENT HEALTH QUESTIONNAIRE - PHQ9: SUM OF ALL RESPONSES TO PHQ QUESTIONS 1-9: 8

## 2019-03-27 NOTE — PATIENT INSTRUCTIONS
Thank you for choosing [facility].  You may be receiving an email and/or telephone survey request from [facility] Customer Experience regarding your visit today.  Please take a few minutes to respond to the survey to let us know how we are doing.      If you have questions or concerns, please contact us via [employer] [facility] or you can contact your care team at [phone].    Our Clinic hours are:  Monday 6:40 am  to 7:00 pm  Tuesday -Friday 6:40 am to 5:00 pm    The [place] outpatient lab hours are:  Monday - Friday 6:10 am to 4:45 pm  Saturdays 7:00 am to 11:00 am  Appointments are required, call [phone]    If you have clinical questions after hours or would like to schedule an appointment,  call the clinic at [phone].  Patient Education   About Migraine Headaches  What is a migraine headache?  A migraine is a very painful type of headache. It may last a few hours or days. During a migraine, you may have vision problems and feel sick to your stomach.  Migraines are three times more common in women than in men. Once they start, you may get them for the rest of your life. They may occur less often as you age.  What causes it?  We don't know the exact cause, but many things can trigger a migraine. These include:    Stress and anxiety    Lack of food or sleep    Foods and drinks that contain tyramine, such as:  ? Red aida and some beers  ? Aged cheeses (like cheddar or blue cheese)  ? Yeast  ? Aged, dried or cured meats  ? Fermented foods like sauerkraut, soy sauce, miso and cam chi    Too much light    Chemicals (gasoline, cleaning products, perfume, glue, etc.)    Weather changes    Certain medicines    Hormone changes (in women).  What are symptoms?  Some people can tell when they're about to have a migraine. They may see flashing lights or zigzag lines in front of their eyes. Or they may lose their vision for a short time.  With a migraine, you may:    Feel pain or pulsing on one side of the head.  For some people, the entire head hurts.    Be very sensitive to light and sound.    Feel nauseated (sick to your stomach) and vomit (throw up).  How is it treated?  Your care team may suggest medicine to prevent or relieve your symptoms. Once you start having migraines, you may also need medicine to keep them from getting worse.   Take your medicine at the first sign of a migraine. It may take several tries to find a medicine that works for you.   When a migraine comes:    Lie down in a quiet, dark room. Try not to bend over, as this may cause more pain.    Put a cold pack on your head. Try a bag of frozen vegetables, wrapped with a thin cloth.    Drink extra fluids. If you can't drink, suck on ice chips.  How can I prevent migraines?  It will help to keep a migraine diary. By keeping a diary, you may find the cause of your headaches. Once you know the cause, you can take steps to prevent migraines in the future.  It also helps to live a healthy lifestyle. This means:    Get regular exercise. (If exercise triggers your headaches, tell your doctor.)    Drink plenty of water.    Eat healthy meals at regular times.    Try to go to bed and get up and regular times.    Don't smoke. Avoid second-hand smoke.    Avoid caffeine. Coffee, tea and soda may help a migraine. But if you drink them too often, they can cause migraines.    Find ways to relax, have fun and reduce stress in your life.    Try complementary therapies (yoga, acupuncture, massage, biofeedback, etc.).  When should I call my clinic?  Call your clinic at once if you have new or unusual symptoms, such as:     Pain that gets worse or lasts more than 24 hours.    Slurred speech or problems talking.    A weak arm or leg that you can't move normally.    A fever over 100 F (37.8 C), taken under the tongue.    Stiff neck.    Vomiting (throwing up) for several hours.  For more information about migraines  Contact the [employer] for Headache Education (ACHE) at  5-033-235-7862 or www.headaches.org.  Local providers of complementary therapies  These services may not be covered by insurance. Check your insurance plan.  [facility]  [phone]   Includes pain education, behavioral therapy,   trigger point injections and more.  [place] [facility]   [phone]   Includes acupuncture, massage, myofascial release.  [facility] at the [facility]  [phone]  [email or web address]  Includes mindfulness, meditation, yoga.  Community Education     [place]: commed.mpls.[facility].mn.[facility]: [email or web address].[email or web address].[email or web address]  Look for programs on yoga, mindfulness, etc.  [facility] Crisis [facility]   [phone]  www.[place].org  Includes mindfulness, yoga, body-mind skills.  For informational purposes only. Not to replace the advice of your health care provider.   Copyright   2011 [facility]. All rights reserved. [employer] [id number] - 11/15.
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
## 2019-03-28 ENCOUNTER — TELEPHONE (OUTPATIENT)
Dept: FAMILY MEDICINE | Facility: CLINIC | Age: 29
End: 2019-03-28

## 2019-03-28 NOTE — TELEPHONE ENCOUNTER
Reason for Call:  Other med question    Detailed comments: Patient states she was prescribed Trazodone for sleep.  Took one last night for the first time and woke up very dizzy, light headed, etc.  She states her mom told her to call a nurse.    Phone Number Patient can be reached at: Home number on file [phone] (home)    Best Time: any    Can we leave a detailed message on this number? YES    Call taken on 3/28/2019 at 12:06 PM by Juliann Hassan

## 2019-03-28 NOTE — TELEPHONE ENCOUNTER
"S-(situation): Severe dizziness and severe migraine     B-(background): took trazodone last night for the first time    A-(assessment): Patient states she took 1 tablet (50 mg) of trazodone last night for the first time. She states this morning she \"feels like I am spinning, like I am a on a ride\". Patient also states she keeps falling and cannot walk. Patient states her whole body feels weak. Patient states she has not hit her head or had any trauma to the head. She states with these falls she simply falls to her knees. Patient states she has not taken her BP or HR but her heartbeat feels normal.   Patient also states she has a \"super bad migraine, the worst one I have ever ever had\". Patient states migraines are normal for her but this one is by far the worst. Patient states she cannot wear her glasses because it hurts her head so bad and also states her vision is blurry but she feels \"it is like normal when I don't wear my glasses\". Patient states she has not really eaten anything today, she tried a sandwich but she was so dizzy she could not eat it and she has only drank one glass of water. Patient states she has been in bed today because it is so bad.   Patient also complaining of nausea but has not vomited.  Patient denies losing consciousness, sudden onset of weakness, one side of body weak, having a fever, earache, confusion, stiff neck, or vomiting.      R-(recommendations): Patient was advised to seek emergency cares now and have a  take her. Patient agrees with plan.    Routing to provider for advisement? Is this an adverse reaction to trazodone?     Thanks,  Marija ALEXANDRE RN      "

## 2019-03-29 ENCOUNTER — HOSPITAL ENCOUNTER (EMERGENCY)
Facility: CLINIC | Age: 29
Discharge: HOME | End: 2019-03-29
Attending: EMERGENCY MEDICINE | Admitting: EMERGENCY MEDICINE
Payer: COMMERCIAL

## 2019-03-29 VITALS
WEIGHT: 160 LBS | BODY MASS INDEX: 28.35 KG/M2 | HEART RATE: 78 BPM | SYSTOLIC BLOOD PRESSURE: 118 MMHG | OXYGEN SATURATION: 99 % | HEIGHT: 63 IN | RESPIRATION RATE: 16 BRPM | TEMPERATURE: 98.5 F | DIASTOLIC BLOOD PRESSURE: 64 MMHG

## 2019-03-29 DIAGNOSIS — G43.809 OTHER MIGRAINE WITHOUT STATUS MIGRAINOSUS, NOT INTRACTABLE: ICD-10-CM

## 2019-03-29 DIAGNOSIS — H81.10 BENIGN PAROXYSMAL POSITIONAL VERTIGO, UNSPECIFIED LATERALITY: ICD-10-CM

## 2019-03-29 PROCEDURE — 96375 TX/PRO/DX INJ NEW DRUG ADDON: CPT

## 2019-03-29 PROCEDURE — 96361 HYDRATE IV INFUSION ADD-ON: CPT

## 2019-03-29 PROCEDURE — 25000128 H RX IP 250 OP 636: Performed by: EMERGENCY MEDICINE

## 2019-03-29 PROCEDURE — 96374 THER/PROPH/DIAG INJ IV PUSH: CPT

## 2019-03-29 PROCEDURE — 99284 EMERGENCY DEPT VISIT MOD MDM: CPT | Mod: Z6 | Performed by: EMERGENCY MEDICINE

## 2019-03-29 PROCEDURE — 99285 EMERGENCY DEPT VISIT HI MDM: CPT | Mod: 25

## 2019-03-29 RX ORDER — METOCLOPRAMIDE HYDROCHLORIDE 5 MG/ML
10 INJECTION INTRAMUSCULAR; INTRAVENOUS ONCE
Status: COMPLETED | OUTPATIENT
Start: 2019-03-29 | End: 2019-03-29

## 2019-03-29 RX ORDER — SODIUM CHLORIDE 9 MG/ML
1000 INJECTION, SOLUTION INTRAVENOUS CONTINUOUS
Status: DISCONTINUED | OUTPATIENT
Start: 2019-03-29 | End: 2019-03-29 | Stop reason: HOSPADM

## 2019-03-29 RX ORDER — DIPHENHYDRAMINE HYDROCHLORIDE 50 MG/ML
25 INJECTION INTRAMUSCULAR; INTRAVENOUS ONCE
Status: COMPLETED | OUTPATIENT
Start: 2019-03-29 | End: 2019-03-29

## 2019-03-29 RX ORDER — KETOROLAC TROMETHAMINE 15 MG/ML
15 INJECTION, SOLUTION INTRAMUSCULAR; INTRAVENOUS ONCE
Status: COMPLETED | OUTPATIENT
Start: 2019-03-29 | End: 2019-03-29

## 2019-03-29 RX ADMIN — SODIUM CHLORIDE 1000 ML: 9 INJECTION, SOLUTION INTRAVENOUS at 20:08

## 2019-03-29 RX ADMIN — METOCLOPRAMIDE 10 MG: 5 INJECTION, SOLUTION INTRAMUSCULAR; INTRAVENOUS at 20:09

## 2019-03-29 RX ADMIN — DIPHENHYDRAMINE HYDROCHLORIDE 25 MG: 50 INJECTION, SOLUTION INTRAMUSCULAR; INTRAVENOUS at 20:08

## 2019-03-29 RX ADMIN — KETOROLAC TROMETHAMINE 15 MG: 15 INJECTION, SOLUTION INTRAMUSCULAR; INTRAVENOUS at 20:08

## 2019-03-29 ASSESSMENT — MIFFLIN-ST. JEOR: SCORE: 1429.89

## 2019-03-29 NOTE — ED AVS SNAPSHOT
[facility] Emergency Department  [address] [address]  Phone:  [phone]  Fax:  [phone]                                    Laura Grewal   MRN: [id number]    Department:  [facility] Emergency Department   Date of Visit:  3/29/2019           After Visit Summary Signature Page    I have received my discharge instructions, and my questions have been answered. I have discussed any challenges I see with this plan with the nurse or doctor.    ..........................................................................................................................................  Patient/Patient Representative Signature      ..........................................................................................................................................  Patient Representative Print Name and Relationship to Patient    ..................................................               ................................................  Date                                   Time    ..........................................................................................................................................  Reviewed by Signature/Title    ...................................................              ..............................................  Date                                               Time          22EPIC Rev 08/18

## 2019-03-30 NOTE — ED NOTES
Pt here with dizziness that she woke up with on Thursday, thought it might be due to taking Trazadone on Wednesday night. Pt states that dizziness is worse with movement, has never experienced anything like this before.

## 2019-03-30 NOTE — ED PROVIDER NOTES
"  History     Chief Complaint   Patient presents with     Medication Reaction     trazadone taken on wednesday as sleep aid. dizziness since     Dizziness     HPI     Laura Grewal is a 28 year old female who presents to the ED for evaluation of dizziness and a migraine headache. The patient states that she was seen in clinic two days ago, and was prescribed trazodone to aid with sleep. She states that she took trazodone for the first time later that night, and woke up yesterday morning with severe dizziness \"like the room was spinning\" and a migraine headache. She reports that she is still experiencing these symptoms, and had an episode of emesis earlier today. Dizziness is aggravated with any movement, and improves when she is still. She describes that she can feel the migraine behind her eyes, through her head, and into her neck. The patient has a past history of migraines, but states that this one is worse than usual. She has never been evaluated by neurology, but was prescribed topiramate by her PCP two days ago. She reports that she has not experienced similar dizziness before. The patient denies any recent head injury, fever, cough, sore throat, or problems with her bowel or bladder. She does not smoking, consume alcohol, or use illicit drugs. She has drug allergies to sertraline, tramadol, Zoloft, and methylergonovine.      Allergies:  Allergies   Allergen Reactions     Sertraline Other (See Comments)     Headache, Migraines     Tramadol Other (See Comments)     Mood swings     Zoloft [Serotonin Reuptake Inhibitors] Other (See Comments)     Migraine Headaches     Methylergonovine Anxiety       Problem List:    Patient Active Problem List    Diagnosis Date Noted     mirena IUD 03/23/2017     Priority: Medium     Mirena IUD placed today by Mago Chaidez MD  LOT# [id number]  Exp:09/19       Migraine without status migrainosus, not intractable, unspecified migraine type 06/09/2016     Priority: Medium     KERRY " (generalized anxiety disorder) 06/09/2016     Priority: Medium     Did not tolerate Zoloft.  Currently trying lexapro       Genital warts 12/09/2015     Priority: Medium     Sinus tachycardia 01/14/2015     Priority: Medium     Intermittent asthma 05/27/2014     Priority: Medium     GERD (gastroesophageal reflux disease) 06/10/2013     Priority: Medium     Psoriasis 10/16/2008     Priority: Medium     Has tried clindamycin, triamcinolone, and differin.  On nothing currently.       Congenital vascular nevus 05/24/2007     Priority: Medium     Right forehead       Temporomandibular joint disorder 03/20/2007     Priority: Medium     right side       CARDIOVASCULAR SCREENING; LDL GOAL LESS THAN 130 01/31/2012     Priority: Low        Past Medical History:    Past Medical History:   Diagnosis Date     Acute sinusitis, unspecified      Acute upper respiratory infections of unspecified site      Chickenpox      Depression with anxiety 5/19/2012     Mild major depression (H) 5/19/2012     Unspecified otitis media        Past Surgical History:    Past Surgical History:   Procedure Laterality Date     MOUTH SURGERY      wisdom teeth       Family History:    Family History   Problem Relation Age of Onset     C.A.D. Maternal Grandmother      Asthma Maternal Grandmother      Heart Disease Maternal Grandmother         pacemaker     Cancer - colorectal Paternal Grandmother         colon     Cerebrovascular Disease Paternal Grandmother      Coronary Artery Disease Paternal Grandfather      Asthma Sister      Gastrointestinal Disease Sister         celiac       Social History:  Marital Status:  Single [1]  Social History     Tobacco Use     Smoking status: Never Smoker     Smokeless tobacco: Never Used   Substance Use Topics     Alcohol use: Yes     Alcohol/week: 0.0 oz     Comment: very little     Drug use: No        Medications:      IBUPROFEN PO   topiramate (TOPAMAX) 25 MG tablet   traZODone (DESYREL) 50 MG tablet         Review  "of Systems     All other systems are reviewed and are negative.      Physical Exam   BP: 121/86  Pulse: 98  Heart Rate: 93  Temp: 98.5  F (36.9  C)  Resp: 16  Height: 160 cm (5' 3\")  Weight: 72.6 kg (160 lb)  SpO2: 100 %      Physical Exam  Nontoxic-appearing no respiratory distress alert and oriented x3.    Head atraumatic normocephalic    Cranial nerves; vision baseline fields intact, PERRL, EOMI, facial sensation intact to light touch, facial muscle tone intact and symmetrical, hearing grossly intact,swallowing without difficulty, voice baseline and normal, SCM  strength intact, tongue protrudes midline.  Palatal elevation symmetric    TM's unremarkable, EACs clear, oropharynx moist without lesions or erythema.    Neck supple full active painless range of motion no posterior midline tenderness.    No cervical adenopathy    Lungs clear to auscultation no rales rhonchi or wheezes    Heart regular no murmur S3 or rub    Abdomen soft nontender bowel sounds positive no masses or HSM    Strength and sensation intact throughout the extremities, skin clear from rash or lesion.      ED Course        Procedures                 Critical Care time:  none  Patient is reevaluated after above medications, headache is completely resolved.            No results found for this or any previous visit (from the past 24 hour(s)).    Medications   0.9% sodium chloride BOLUS (0 mLs Intravenous Stopped 3/29/19 2104)   diphenhydrAMINE (BENADRYL) injection 25 mg (25 mg Intravenous Given 3/29/19 2008)   ketorolac (TORADOL) injection 15 mg (15 mg Intravenous Given 3/29/19 2008)   metoclopramide (REGLAN) injection 10 mg (10 mg Intravenous Given 3/29/19 2009)          7:30 PM Patient assessed.      Assessments & Plan (with Medical Decision Making)  Patient presents with positional vertigo, discrete episodes, no focal neurologic change either by complaint or finding on exam.  Consistent with benign positional vertigo.  Exacerbation of chronic " headaches responded well to above treatments, resolution of headache patient comfortable with discharge home.  Reviewed half somerssonia maneuver, Dr. Deepti Payan video.  Follow-up primary care, return criteria reviewed     I have reviewed the nursing notes.    I have reviewed the findings, diagnosis, plan and need for follow up with the patient.          Medication List      There are no discharge medications for this visit.         Final diagnoses:   Other migraine without status migrainosus, not intractable   Benign paroxysmal positional vertigo, unspecified laterality       This document serves as a record of the services and decisions personally performed and made by Antonio Millan MD. It was created on HIS/HER behalf by Brittany King, a trained medical scribe. The creation of this document is based the provider's statements to the medical scribe.  Brittany King 7:37 PM 3/29/2019    Provider:   The information in this document, created by the medical scribe for me, accurately reflects the services I personally performed and the decisions made by me. I have reviewed and approved this document for accuracy prior to leaving the patient care area.  Antonio Millan MD 7:37 PM 3/29/2019    3/29/2019   [facility] EMERGENCY DEPARTMENT     Antonio Millan MD  03/30/19 4846

## 2019-04-03 ENCOUNTER — OFFICE VISIT (OUTPATIENT)
Dept: FAMILY MEDICINE | Facility: CLINIC | Age: 29
End: 2019-04-03
Payer: COMMERCIAL

## 2019-04-03 ENCOUNTER — TELEPHONE (OUTPATIENT)
Dept: FAMILY MEDICINE | Facility: CLINIC | Age: 29
End: 2019-04-03

## 2019-04-03 VITALS
BODY MASS INDEX: 29.41 KG/M2 | TEMPERATURE: 97.3 F | SYSTOLIC BLOOD PRESSURE: 118 MMHG | OXYGEN SATURATION: 98 % | HEART RATE: 107 BPM | DIASTOLIC BLOOD PRESSURE: 86 MMHG | RESPIRATION RATE: 16 BRPM | WEIGHT: 166 LBS | HEIGHT: 63 IN

## 2019-04-03 DIAGNOSIS — H81.10 BENIGN PAROXYSMAL POSITIONAL VERTIGO, UNSPECIFIED LATERALITY: Primary | ICD-10-CM

## 2019-04-03 DIAGNOSIS — G43.009 MIGRAINE WITHOUT AURA AND WITHOUT STATUS MIGRAINOSUS, NOT INTRACTABLE: Primary | ICD-10-CM

## 2019-04-03 DIAGNOSIS — H81.11 BENIGN PAROXYSMAL POSITIONAL VERTIGO OF RIGHT EAR: ICD-10-CM

## 2019-04-03 PROCEDURE — 99214 OFFICE O/P EST MOD 30 MIN: CPT | Performed by: NURSE PRACTITIONER

## 2019-04-03 RX ORDER — LORAZEPAM 0.5 MG/1
0.5 TABLET ORAL DAILY PRN
Qty: 10 TABLET | Refills: 0 | Status: SHIPPED | OUTPATIENT
Start: 2019-04-03 | End: 2019-05-13

## 2019-04-03 RX ORDER — SUMATRIPTAN 25 MG/1
25 TABLET, FILM COATED ORAL
Qty: 10 TABLET | Refills: 1 | Status: SHIPPED | OUTPATIENT
Start: 2019-04-03 | End: 2019-04-17

## 2019-04-03 RX ORDER — MECLIZINE HYDROCHLORIDE 25 MG/1
25 TABLET ORAL 3 TIMES DAILY PRN
Qty: 21 TABLET | Refills: 0 | Status: SHIPPED | OUTPATIENT
Start: 2019-04-03 | End: 2019-05-13

## 2019-04-03 RX ORDER — ONDANSETRON 4 MG/1
4 TABLET, ORALLY DISINTEGRATING ORAL EVERY 8 HOURS PRN
Qty: 15 TABLET | Refills: 1 | Status: SHIPPED | OUTPATIENT
Start: 2019-04-03 | End: 2019-11-06

## 2019-04-03 ASSESSMENT — MIFFLIN-ST. JEOR: SCORE: 1461.06

## 2019-04-03 NOTE — TELEPHONE ENCOUNTER
"Pt was seen today for vertigo of right ear with the following notes per Rea:  Meclizine 25 mg 3 times daily as needed for vertigo and nausea,   if no improvement try Lorazepam at bedtime as needed       However. Lorazepam was sent to pharmacy.    Attempted to reach pt but no answer and unable to leave a message; \"voicemail has not been set up yet.\"    Routed to provider.  Did pt opt for lorazepam instead?     Becki Camacho RN      "

## 2019-04-03 NOTE — PATIENT INSTRUCTIONS
Meclizine 25 mg 3 times daily as needed for vertigo.  If no improvement can take Lorazepam 1 tablet at bedtime as needed for nausea and vertigo.  Zofran 4 mg every 8 hrs as needed for nausea and vomiting    Imitrex 25 mg as needed for migraine, can repeat treatment in 2 hrs if still having headache    Try Naproxen 220 mg twice daily as needed for headache

## 2019-04-03 NOTE — PROGRESS NOTES
"  SUBJECTIVE:   Laura Grewal is a 28 year old female who presents to clinic today for the following health issues: migraines, vertigo, nausea and vomiting. Described headache as throbbing bilateral frontal headaches, sometimes feeling pains behind her eyes. Tried Excedrin migraine, Elavil and Topamax in the past without improvement.     ED/UC Followup:    Facility:  [facility]   Date of visit: 3/29/19   Reason for visit: Other migraine without status migrainosus, not intractable  Current Status: Still feeling dizzy and having migraines every day that last all day, states she cant really do much and hasn't ate or drank a lot.      Problem list and histories reviewed & adjusted, as indicated.  Additional history: as documented    Labs reviewed in EPIC    Reviewed and updated as needed this visit by clinical staff       Reviewed and updated as needed this visit by Provider         ROS:  Constitutional, HEENT, cardiovascular, pulmonary, gi and gu systems are negative, except as otherwise noted.    OBJECTIVE:     /86 (BP Location: Left arm, Patient Position: Sitting, Cuff Size: Adult Regular)   Pulse 107   Temp 97.3  F (36.3  C) (Tympanic)   Resp 16   Ht 1.607 m (5' 3.25\")   Wt 75.3 kg (166 lb)   SpO2 98%   BMI 29.17 kg/m    Body mass index is 29.17 kg/m .  GENERAL: healthy, alert and no distress  EYES: Eyes grossly normal to inspection, PERRL and conjunctivae and sclerae normal  HENT: normal cephalic/atraumatic, right ear: clear effusion and left ear: normal TM mobility on insufflation  NECK: no adenopathy, no asymmetry, masses, or scars and thyroid normal to palpation  RESP: lungs clear to auscultation - no rales, rhonchi or wheezes  CV: regular rate and rhythm, normal S1 S2, no S3 or S4, no murmur, click or rub, no peripheral edema and peripheral pulses strong  PSYCH: mentation appears normal, affect normal/bright    Diagnostic Test Results:  none     ASSESSMENT/PLAN:     1. Migraine without aura and " without status migrainosus, not intractable    - ondansetron (ZOFRAN-ODT) 4 MG ODT tab; Take 1 tablet (4 mg) by mouth every 8 hours as needed for nausea  Dispense: 15 tablet; Refill: 1  - SUMAtriptan (IMITREX) 25 MG tablet; Take 1 tablet (25 mg) by mouth at onset of headache for migraine  Dispense: 10 tablet; Refill: 1  -Naproxen 220 mg twice daily as needed   -drink more fluids    2. Benign paroxysmal positional vertigo of right ear  -Meclizine 25 mg 3 times daily as needed for vertigo and nausea, if no improvement try Lorazepam at bedtime as needed   -continue with vestibular exercises   - LORazepam (ATIVAN) 0.5 MG tablet; Take 1 tablet (0.5 mg) by mouth daily as needed for nausea or vomiting (vertigo)  Dispense: 10 tablet; Refill: 0    See Patient Instructions    CIELO Li [facility] [place]

## 2019-04-03 NOTE — TELEPHONE ENCOUNTER
Reason for Call:  Medication or medication refill:    Do you use a [facility]?  Name of the pharmacy and phone number for the current request:  [facility] [phone]    Name of the medication requested: meclazine    Other request: this was not ordered today but I see in OV notes it was supposed to be    Can we leave a detailed message on this number? YES    Phone number patient can be reached at: Home number on file [phone] (home)    Best Time: any    Call taken on 4/3/2019 at 11:08 AM by Mago Sheridan

## 2019-04-03 NOTE — TELEPHONE ENCOUNTER
Meclizine available over the counter, this is why I did not do it as prescription. Sent prescription, but it might not be covered since it is over the counter medication. over the counter price is not expensive.     CIELO Li CNP

## 2019-04-05 NOTE — TELEPHONE ENCOUNTER
Patient notified, patient stated she only had the Lorazepam last night and took it and it did not help the vertigo. Patient just took her first dose of Meclizine at 11am and states only helping a little. Told the patient to give the Meclizine more time, stated to take another dose in 2 hours and then at bedtime, told the patient could also add the Lorazepam later if still not finding the meclizine not helping after 3 doses.  Told the patient to seek the ER if vertigo worsens or not finding medications helping. Patient verbalizes understanding.     ARUNA Gaines

## 2019-04-15 ENCOUNTER — TELEPHONE (OUTPATIENT)
Dept: FAMILY MEDICINE | Facility: CLINIC | Age: 29
End: 2019-04-15

## 2019-04-15 NOTE — TELEPHONE ENCOUNTER
Panel Management Review      Patient has the following on her problem list:     Asthma review     ACT Total Scores 4/7/2016   ACT TOTAL SCORE -   ASTHMA ER VISITS -   ASTHMA HOSPITALIZATIONS -   ACT TOTAL SCORE (Goal Greater than or Equal to 20) 21   In the past 12 months, how many times did you visit the emergency room for your asthma without being admitted to the hospital? 0   In the past 12 months, how many times were you hospitalized overnight because of your asthma? 0      1. Is Asthma diagnosis on the Problem List? Yes    2. Is Asthma listed on Health Maintenance? No   3. Patient is due for:  ACT      Composite cancer screening  Chart review shows that this patient is due/due soon for the following None  Summary:    Patient is due/failing the following:   ACT    Action needed:   Patient needs to do ACT.    Type of outreach:    Letter mailed with ACT for the patient to complete and return.    Questions for provider review:    None                                                                                                                                    DUSTIN Mike MA

## 2019-04-15 NOTE — LETTER
April 15, 2019      Laura BLANKENSHIP [facility]  [address] [address]        Dear Laura,     Your [facility] Team works hard to make sure that you and your family receive exceptional care. Enclosed you will find a copy of the Asthma Control Test (ACT) that our clinic uses to monitor and manage your asthma. This test is an assessment tool that we use to determine how well your asthma is controlled.  Please complete the enclosed form and return in the provided envelope.    Our records also indicate you will be due for your pap in July, you can schedule this through [facility] or by calling the clinic at [phone].    Thank you for trusting us with your health care.    Sincerely,        Your [facility] Care Team/[email or web address]

## 2019-04-17 ENCOUNTER — OFFICE VISIT (OUTPATIENT)
Dept: FAMILY MEDICINE | Facility: CLINIC | Age: 29
End: 2019-04-17
Payer: COMMERCIAL

## 2019-04-17 VITALS
SYSTOLIC BLOOD PRESSURE: 116 MMHG | HEIGHT: 63 IN | DIASTOLIC BLOOD PRESSURE: 84 MMHG | WEIGHT: 165.3 LBS | BODY MASS INDEX: 29.29 KG/M2 | RESPIRATION RATE: 18 BRPM | HEART RATE: 109 BPM | TEMPERATURE: 98.3 F | OXYGEN SATURATION: 100 %

## 2019-04-17 DIAGNOSIS — G43.009 MIGRAINE WITHOUT AURA AND WITHOUT STATUS MIGRAINOSUS, NOT INTRACTABLE: Primary | ICD-10-CM

## 2019-04-17 DIAGNOSIS — H81.13 BENIGN PAROXYSMAL POSITIONAL VERTIGO DUE TO BILATERAL VESTIBULAR DISORDER: ICD-10-CM

## 2019-04-17 DIAGNOSIS — H65.91 FLUID LEVEL BEHIND TYMPANIC MEMBRANE OF RIGHT EAR: ICD-10-CM

## 2019-04-17 DIAGNOSIS — F33.1 MODERATE EPISODE OF RECURRENT MAJOR DEPRESSIVE DISORDER (H): ICD-10-CM

## 2019-04-17 PROCEDURE — 99214 OFFICE O/P EST MOD 30 MIN: CPT | Performed by: NURSE PRACTITIONER

## 2019-04-17 RX ORDER — RIZATRIPTAN BENZOATE 10 MG/1
10 TABLET ORAL
Qty: 10 TABLET | Refills: 3 | Status: SHIPPED | OUTPATIENT
Start: 2019-04-17 | End: 2019-11-06

## 2019-04-17 RX ORDER — FLUOXETINE 10 MG/1
10 TABLET, FILM COATED ORAL DAILY
Qty: 30 TABLET | Refills: 3 | Status: SHIPPED | OUTPATIENT
Start: 2019-04-17 | End: 2019-05-13

## 2019-04-17 ASSESSMENT — MIFFLIN-ST. JEOR: SCORE: 1457.89

## 2019-04-17 NOTE — PROGRESS NOTES
"  SUBJECTIVE:   Laura Grewal is a 28 year old female who presents to clinic today for the following   health issues:    Dizziness  Onset: Since 3/29     Description:   Do you feel faint:  no   Does it feel like the surroundings (bed, room) are moving: YES when she lays down then gets back up   Unsteady/off balance: YES  Have you passed out or fallen: no     Intensity: moderate    Progression of Symptoms:  Dizziness has gotten a little better     Accompanying Signs & Symptoms:  Heart palpitations: no   Nausea, vomiting: no   Weakness in arms or legs: no   Fatigue: no   Vision or speech changes: no   Ringing in ears (Tinnitus): YES  Hearing Loss: no     History:   Head trauma/concussion hx: no   Previous similar symptoms: YES  Recent bleeding history: no     Precipitating factors:   Worse with activity or head movement: YES  Any new medications (BP?): no   Alcohol/drug abuse/withdrawal: no     Alleviating factors:   Does staying in a fixed position give relief:  YES     Therapies Tried and outcome: Meclizine - made it worse, Lorazepam- did not help       Additional history: as documented    Reviewed  and updated as needed this visit by clinical staff  Tobacco  Allergies  Meds  Med Hx  Surg Hx  Fam Hx  Soc Hx        Reviewed and updated as needed this visit by Provider         BP Readings from Last 3 Encounters:   04/17/19 116/84   04/03/19 118/86   03/29/19 118/64    Wt Readings from Last 3 Encounters:   04/17/19 75 kg (165 lb 4.8 oz)   04/03/19 75.3 kg (166 lb)   03/29/19 72.6 kg (160 lb)                  Labs reviewed in EPIC    ROS:  Constitutional, HEENT, cardiovascular, pulmonary, gi and gu systems are negative, except as otherwise noted.    OBJECTIVE:     /84 (BP Location: Left arm, Patient Position: Sitting, Cuff Size: Adult Regular)   Pulse 109   Temp 98.3  F (36.8  C) (Tympanic)   Resp 18   Ht 1.607 m (5' 3.25\")   Wt 75 kg (165 lb 4.8 oz)   SpO2 100%   BMI 29.05 kg/m    Body mass index " is 29.05 kg/m .  GENERAL: healthy, alert and no distress  EYES: Eyes grossly normal to inspection, PERRL and conjunctivae and sclerae normal  HENT: normal cephalic/atraumatic, right ear: clear effusion and left ear: normal: no effusions, no erythema, normal landmarks  NECK: no adenopathy, no asymmetry, masses, or scars and thyroid normal to palpation  MS: no gross musculoskeletal defects noted, no edema  NEURO: Normal strength and tone, mentation intact and speech normal  PSYCH: mentation appears normal, affect normal/bright    Diagnostic Test Results:  none     ASSESSMENT/PLAN:     1. Migraine without aura and without status migrainosus, not intractable  -worsening migraines, responded to Imitrex, but states headache returned again same day  -feeling down and depressed because of her headaches, can not play with her children as she used to   - CT Head w/o Contrast; Future  - rizatriptan (MAXALT) 10 MG tablet; Take 1 tablet (10 mg) by mouth at onset of headache for migraine  Dispense: 10 tablet; Refill: 3  -advised patient to start Topamax   -can also try over the counter vitamin B 6 supplement  -headache diary, avoid foods that can cause migraines (alcohol, cheese, chocolate, etc)  - NEUROLOGY ADULT REFERRAL    2. Benign paroxysmal positional vertigo due to bilateral vestibular disorder  -start vestibular therapy  - CT Head w/o Contrast; Future  - PHYSICAL THERAPY REFERRAL; Future  - NEUROLOGY ADULT REFERRAL    3. Moderate episode of recurrent major depressive disorder (H)    - FLUoxetine (PROZAC) 10 MG tablet; Take 1 tablet (10 mg) by mouth daily  Dispense: 30 tablet; Refill: 3    4. Fluid level behind tympanic membrane of right ear  -Flonase  -Claritin 10 mg daily  -if still no improvement follow up with ENT      See Patient Instructions    CIELO Li [facility] [place]

## 2019-04-30 ENCOUNTER — TELEPHONE (OUTPATIENT)
Dept: FAMILY MEDICINE | Facility: CLINIC | Age: 29
End: 2019-04-30

## 2019-04-30 DIAGNOSIS — F40.240 CLAUSTROPHOBIA: Primary | ICD-10-CM

## 2019-04-30 NOTE — TELEPHONE ENCOUNTER
A few options.  If she still has the Ativan left, she could take 1-2 tabs (0.5mg to 1mg) 20 min prior to procedure.  Otherwise let me know and I will print another Rx for the ativan 1mg for procedure.    Covering for your clinician.  Thanks,  Hieu Lucas MD  [facility]

## 2019-04-30 NOTE — TELEPHONE ENCOUNTER
Patient called stating that she is claustrophobic and needs medication for imaging. The  head Ct scan is scheduled for next  Wednesday 05/08. Patient calling to requesting     Confirmed patient has a ride to and from appt.     [facility]. --pharmacy    Valerie CHAN  Station

## 2019-04-30 NOTE — TELEPHONE ENCOUNTER
Patient informed of message below from provider.  Patient does not have any Ativan left.   Requests prescription .    Provider please review and advise. Thank you.

## 2019-05-01 RX ORDER — LORAZEPAM 1 MG/1
1 TABLET ORAL ONCE
Qty: 1 TABLET | Refills: 0 | Status: SHIPPED | OUTPATIENT
Start: 2019-05-01 | End: 2019-05-01

## 2019-05-01 NOTE — TELEPHONE ENCOUNTER
Rx faxed to [facility] and pt notified by FLAKITO Sheridan on 5/1/2019 at 7:59 AM

## 2019-05-08 ENCOUNTER — HOSPITAL ENCOUNTER (OUTPATIENT)
Dept: CT IMAGING | Facility: CLINIC | Age: 29
Discharge: HOME | End: 2019-05-08
Attending: NURSE PRACTITIONER | Admitting: NURSE PRACTITIONER
Payer: COMMERCIAL

## 2019-05-08 DIAGNOSIS — G43.009 MIGRAINE WITHOUT AURA AND WITHOUT STATUS MIGRAINOSUS, NOT INTRACTABLE: ICD-10-CM

## 2019-05-08 DIAGNOSIS — H81.13 BENIGN PAROXYSMAL POSITIONAL VERTIGO DUE TO BILATERAL VESTIBULAR DISORDER: ICD-10-CM

## 2019-05-08 PROCEDURE — 70450 CT HEAD/BRAIN W/O DYE: CPT

## 2019-05-13 ENCOUNTER — OFFICE VISIT (OUTPATIENT)
Dept: FAMILY MEDICINE | Facility: CLINIC | Age: 29
End: 2019-05-13
Payer: COMMERCIAL

## 2019-05-13 VITALS
OXYGEN SATURATION: 99 % | HEART RATE: 81 BPM | RESPIRATION RATE: 18 BRPM | SYSTOLIC BLOOD PRESSURE: 120 MMHG | WEIGHT: 165.3 LBS | BODY MASS INDEX: 29.29 KG/M2 | HEIGHT: 63 IN | DIASTOLIC BLOOD PRESSURE: 70 MMHG | TEMPERATURE: 98.9 F

## 2019-05-13 DIAGNOSIS — J45.20 MILD INTERMITTENT ASTHMA WITHOUT COMPLICATION: ICD-10-CM

## 2019-05-13 DIAGNOSIS — G43.719 INTRACTABLE CHRONIC MIGRAINE WITHOUT AURA AND WITHOUT STATUS MIGRAINOSUS: ICD-10-CM

## 2019-05-13 DIAGNOSIS — F33.1 MODERATE EPISODE OF RECURRENT MAJOR DEPRESSIVE DISORDER (H): ICD-10-CM

## 2019-05-13 DIAGNOSIS — Z86.59 HISTORY OF ADHD: Primary | ICD-10-CM

## 2019-05-13 PROCEDURE — 99214 OFFICE O/P EST MOD 30 MIN: CPT | Performed by: NURSE PRACTITIONER

## 2019-05-13 RX ORDER — ALBUTEROL SULFATE 90 UG/1
2 AEROSOL, METERED RESPIRATORY (INHALATION) EVERY 6 HOURS
Qty: 8.5 G | Refills: 1 | Status: SHIPPED | OUTPATIENT
Start: 2019-05-13 | End: 2019-11-06

## 2019-05-13 RX ORDER — GABAPENTIN 300 MG/1
300 CAPSULE ORAL AT BEDTIME
Qty: 30 CAPSULE | Refills: 3 | Status: SHIPPED | OUTPATIENT
Start: 2019-05-13 | End: 2019-11-06

## 2019-05-13 RX ORDER — FLUOXETINE 20 MG/1
20 TABLET, FILM COATED ORAL DAILY
Qty: 30 TABLET | Refills: 3 | Status: SHIPPED | OUTPATIENT
Start: 2019-05-13 | End: 2019-07-12

## 2019-05-13 ASSESSMENT — MIFFLIN-ST. JEOR: SCORE: 1457.89

## 2019-05-13 ASSESSMENT — ANXIETY QUESTIONNAIRES
3. WORRYING TOO MUCH ABOUT DIFFERENT THINGS: MORE THAN HALF THE DAYS
6. BECOMING EASILY ANNOYED OR IRRITABLE: MORE THAN HALF THE DAYS
5. BEING SO RESTLESS THAT IT IS HARD TO SIT STILL: NEARLY EVERY DAY
IF YOU CHECKED OFF ANY PROBLEMS ON THIS QUESTIONNAIRE, HOW DIFFICULT HAVE THESE PROBLEMS MADE IT FOR YOU TO DO YOUR WORK, TAKE CARE OF THINGS AT HOME, OR GET ALONG WITH OTHER PEOPLE: VERY DIFFICULT
GAD7 TOTAL SCORE: 14
7. FEELING AFRAID AS IF SOMETHING AWFUL MIGHT HAPPEN: NOT AT ALL
1. FEELING NERVOUS, ANXIOUS, OR ON EDGE: MORE THAN HALF THE DAYS
2. NOT BEING ABLE TO STOP OR CONTROL WORRYING: MORE THAN HALF THE DAYS

## 2019-05-13 ASSESSMENT — PATIENT HEALTH QUESTIONNAIRE - PHQ9
SUM OF ALL RESPONSES TO PHQ QUESTIONS 1-9: 16
5. POOR APPETITE OR OVEREATING: NEARLY EVERY DAY

## 2019-05-13 NOTE — PATIENT INSTRUCTIONS
Try Gabapentin 300 mg at bedtime    Increase Prozac to 20 mg daily    Schedule appointment for evaluation for possible ADD.

## 2019-05-13 NOTE — LETTER
[facility]  [address] [address]  Phone: [phone]    May 13, 2019        Laura BLANKENSHIP [place]  [address] [facility] [address]          To whom it may concern:    RE: Laura Huffclaudia    Patient was seen and treated today at our clinic. The patient has history of asthma worsened in October 2018, possibly related to bad carpet in her apartment.       Sincerely,      CIELO Li CNP  
Yes

## 2019-05-14 ASSESSMENT — ASTHMA QUESTIONNAIRES: ACT_TOTALSCORE: 19

## 2019-05-14 ASSESSMENT — ANXIETY QUESTIONNAIRES: GAD7 TOTAL SCORE: 14

## 2019-07-07 ENCOUNTER — TELEPHONE (OUTPATIENT)
Dept: FAMILY MEDICINE | Facility: CLINIC | Age: 29
End: 2019-07-07

## 2019-07-07 DIAGNOSIS — F32.1 CURRENT MODERATE EPISODE OF MAJOR DEPRESSIVE DISORDER WITHOUT PRIOR EPISODE (H): Primary | ICD-10-CM

## 2019-07-08 NOTE — TELEPHONE ENCOUNTER
Prior Authorization Retail Medication Request    Medication/Dose: fluoxetine  ICD code (if different than what is on RX):    Previously Tried and Failed:  Elavil; lexapro; celexa; prozac; zoloft; desyrel  Rationale:  Patient has used since 2014 and has failed on lower dosing.  Sertraline and zoloft caused migraines    Insurance Name:  BC/  Insurance ID:  [id number]      Pharmacy Information (if different than what is on RX)  Name:    Phone:

## 2019-07-11 NOTE — TELEPHONE ENCOUNTER
Insurance prefers the capsules as they are on formulary while the tablets are non preferred. If a new script could be sent to the pharmacy for the capsules, unless there is clinical reasoning tablets are preferred, please route back to PA team. Please see covered list below:

## 2019-08-09 ENCOUNTER — HOSPITAL ENCOUNTER (EMERGENCY)
Facility: CLINIC | Age: 29
Discharge: HOME | End: 2019-08-09

## 2019-08-09 VITALS
TEMPERATURE: 97.1 F | OXYGEN SATURATION: 99 % | SYSTOLIC BLOOD PRESSURE: 144 MMHG | BODY MASS INDEX: 26.36 KG/M2 | WEIGHT: 150 LBS | RESPIRATION RATE: 16 BRPM | DIASTOLIC BLOOD PRESSURE: 90 MMHG

## 2019-08-10 ENCOUNTER — HOSPITAL ENCOUNTER (EMERGENCY)
Facility: CLINIC | Age: 29
Discharge: HOME | End: 2019-08-10
Attending: EMERGENCY MEDICINE | Admitting: EMERGENCY MEDICINE
Payer: MEDICAID

## 2019-08-10 VITALS
DIASTOLIC BLOOD PRESSURE: 88 MMHG | HEIGHT: 62 IN | BODY MASS INDEX: 27.6 KG/M2 | OXYGEN SATURATION: 98 % | TEMPERATURE: 98.2 F | RESPIRATION RATE: 16 BRPM | SYSTOLIC BLOOD PRESSURE: 132 MMHG | WEIGHT: 150 LBS | HEART RATE: 79 BPM

## 2019-08-10 DIAGNOSIS — R51.9 ACUTE NONINTRACTABLE HEADACHE, UNSPECIFIED HEADACHE TYPE: ICD-10-CM

## 2019-08-10 PROCEDURE — 25800030 ZZH RX IP 258 OP 636: Performed by: EMERGENCY MEDICINE

## 2019-08-10 PROCEDURE — 99284 EMERGENCY DEPT VISIT MOD MDM: CPT | Mod: Z6 | Performed by: EMERGENCY MEDICINE

## 2019-08-10 PROCEDURE — 25000128 H RX IP 250 OP 636: Performed by: EMERGENCY MEDICINE

## 2019-08-10 PROCEDURE — 96375 TX/PRO/DX INJ NEW DRUG ADDON: CPT | Performed by: EMERGENCY MEDICINE

## 2019-08-10 PROCEDURE — 96361 HYDRATE IV INFUSION ADD-ON: CPT | Performed by: EMERGENCY MEDICINE

## 2019-08-10 PROCEDURE — 99284 EMERGENCY DEPT VISIT MOD MDM: CPT | Mod: 25 | Performed by: EMERGENCY MEDICINE

## 2019-08-10 PROCEDURE — 96374 THER/PROPH/DIAG INJ IV PUSH: CPT | Performed by: EMERGENCY MEDICINE

## 2019-08-10 RX ORDER — DEXAMETHASONE SODIUM PHOSPHATE 10 MG/ML
10 INJECTION, SOLUTION INTRAMUSCULAR; INTRAVENOUS ONCE
Status: COMPLETED | OUTPATIENT
Start: 2019-08-10 | End: 2019-08-10

## 2019-08-10 RX ORDER — KETOROLAC TROMETHAMINE 15 MG/ML
15 INJECTION, SOLUTION INTRAMUSCULAR; INTRAVENOUS ONCE
Status: COMPLETED | OUTPATIENT
Start: 2019-08-10 | End: 2019-08-10

## 2019-08-10 RX ORDER — DIPHENHYDRAMINE HYDROCHLORIDE 50 MG/ML
25 INJECTION INTRAMUSCULAR; INTRAVENOUS ONCE
Status: COMPLETED | OUTPATIENT
Start: 2019-08-10 | End: 2019-08-10

## 2019-08-10 RX ADMIN — DEXAMETHASONE SODIUM PHOSPHATE 10 MG: 10 INJECTION, SOLUTION INTRAMUSCULAR; INTRAVENOUS at 01:52

## 2019-08-10 RX ADMIN — DIPHENHYDRAMINE HYDROCHLORIDE 25 MG: 50 INJECTION, SOLUTION INTRAMUSCULAR; INTRAVENOUS at 01:53

## 2019-08-10 RX ADMIN — SODIUM CHLORIDE, POTASSIUM CHLORIDE, SODIUM LACTATE AND CALCIUM CHLORIDE 1000 ML: 600; 310; 30; 20 INJECTION, SOLUTION INTRAVENOUS at 01:52

## 2019-08-10 RX ADMIN — PROCHLORPERAZINE EDISYLATE 10 MG: 5 INJECTION INTRAMUSCULAR; INTRAVENOUS at 01:52

## 2019-08-10 RX ADMIN — KETOROLAC TROMETHAMINE 15 MG: 15 INJECTION, SOLUTION INTRAMUSCULAR; INTRAVENOUS at 01:52

## 2019-08-10 ASSESSMENT — MIFFLIN-ST. JEOR: SCORE: 1368.65

## 2019-08-10 ASSESSMENT — ENCOUNTER SYMPTOMS
ABDOMINAL PAIN: 0
CHILLS: 0
WEAKNESS: 0
NUMBNESS: 0
FATIGUE: 0
CHEST TIGHTNESS: 0
HEADACHES: 1
NAUSEA: 1
SHORTNESS OF BREATH: 0
PHOTOPHOBIA: 1
FEVER: 0
VOMITING: 0
APPETITE CHANGE: 1
BACK PAIN: 0
LIGHT-HEADEDNESS: 0

## 2019-08-10 NOTE — ED TRIAGE NOTES
Pt here for migraine HA. Hx of migraines. States current one has lasted 2 weeks. Pain more intense than previous episodes but location unchanged. Pain behind bilateral eyes and throughout whole head. Severe nausea, no vomiting, photophobia. Here earlier for evaluation but left due to prolonged wait per pt report.

## 2019-08-10 NOTE — ED AVS SNAPSHOT
[facility]  [address] [address]  Phone:  [phone]  Fax:  [phone]                                    Laura Grewal   MRN: [id number]    Department:  [facility]   Date of Visit:  8/10/2019           After Visit Summary Signature Page    I have received my discharge instructions, and my questions have been answered. I have discussed any challenges I see with this plan with the nurse or doctor.    ..........................................................................................................................................  Patient/Patient Representative Signature      ..........................................................................................................................................  Patient Representative Print Name and Relationship to Patient    ..................................................               ................................................  Date                                   Time    ..........................................................................................................................................  Reviewed by Signature/Title    ...................................................              ..............................................  Date                                               Time          22EPIC Rev 08/18

## 2019-08-10 NOTE — ED PROVIDER NOTES
History     Chief Complaint   Patient presents with     Headache     HPI  Laura Grewal is a 28 year old female with history of migraine headaches presenting for evaluation of severe migraine headache over the past 2 weeks.  She reports symptoms are similar to previous but more intense.  Reports frontal headache slightly more intense on the left side with associated photophobia and nausea.  No vomiting.  No fever chills.  Denies neck pain or stiffness.  Denies any numbness, tingling, weakness, or other neurologic symptoms.  Last took some ibuprofen yesterday.      ==================================================================    CHART REVIEW:      Study Result     CT SCAN OF THE HEAD WITHOUT CONTRAST   5/8/2019 10:17 AM      HISTORY: Headache, acute, normal neuro exam; Migraine without aura and  without status migrainosus, not intractable; Benign paroxysmal  positional vertigo due to bilateral vestibular disorder     TECHNIQUE:  Axial images of the head and coronal reformations without  IV contrast material. Radiation dose for this scan was reduced using  automated exposure control, adjustment of the mA and/or kV according  to patient size, or iterative reconstruction technique.     COMPARISON: Scan performed on 2/10/2013.     FINDINGS:      Intracranial contents: The ventricles are normal in size, shape and  configuration.  The brain parenchyma and subarachnoid spaces are  normal. There is no evidence of intracranial hemorrhage, mass, acute  infarct or anomaly.     Visualized orbits/sinuses/mastoids:  The visualized portions of the  sinuses and mastoids appear normal.     Osseous structures/soft tissues:  There is no evidence of trauma.                                                                      IMPRESSION: No acute pathology. No bleed, mass, or areas of acute  infarction are identified. No change         END CHART  REVIEW  ==================================================================    Allergies:  Allergies   Allergen Reactions     Sertraline Other (See Comments)     Headache, Migraines     Tramadol Other (See Comments)     Mood swings     Zoloft [Serotonin Reuptake Inhibitors] Other (See Comments)     Migraine Headaches     Methylergonovine Anxiety       Problem List:    Patient Active Problem List    Diagnosis Date Noted     mirena IUD 03/23/2017     Priority: Medium     Mirena IUD placed today by Mago Chaidez MD  LOT# [id number]  Exp:09/19       Migraine without status migrainosus, not intractable, unspecified migraine type 06/09/2016     Priority: Medium     KERRY (generalized anxiety disorder) 06/09/2016     Priority: Medium     Did not tolerate Zoloft.  Currently trying lexapro       Genital warts 12/09/2015     Priority: Medium     Sinus tachycardia 01/14/2015     Priority: Medium     Intermittent asthma 05/27/2014     Priority: Medium     GERD (gastroesophageal reflux disease) 06/10/2013     Priority: Medium     Psoriasis 10/16/2008     Priority: Medium     Has tried clindamycin, triamcinolone, and differin.  On nothing currently.       Congenital vascular nevus 05/24/2007     Priority: Medium     Right forehead       Temporomandibular joint disorder 03/20/2007     Priority: Medium     right side       CARDIOVASCULAR SCREENING; LDL GOAL LESS THAN 130 01/31/2012     Priority: Low        Past Medical History:    Past Medical History:   Diagnosis Date     Acute sinusitis, unspecified      Acute upper respiratory infections of unspecified site      Chickenpox      Depression with anxiety 5/19/2012     Mild major depression (H) 5/19/2012     Unspecified otitis media        Past Surgical History:    Past Surgical History:   Procedure Laterality Date     MOUTH SURGERY      wisdom teeth       Family History:    Family History   Problem Relation Age of Onset     C.A.D. Maternal Grandmother      Asthma Maternal Grandmother   "    Heart Disease Maternal Grandmother         pacemaker     Cancer - colorectal Paternal Grandmother         colon     Cerebrovascular Disease Paternal Grandmother      Coronary Artery Disease Paternal Grandfather      Asthma Sister      Gastrointestinal Disease Sister         celiac       Social History:  Marital Status:  Single [1]  Social History     Tobacco Use     Smoking status: Never Smoker     Smokeless tobacco: Never Used   Substance Use Topics     Alcohol use: Yes     Alcohol/week: 0.0 oz     Comment: very little     Drug use: No        Medications:      albuterol (PROAIR HFA/PROVENTIL HFA/VENTOLIN HFA) 108 (90 Base) MCG/ACT inhaler   FLUoxetine (PROZAC) 20 MG capsule   gabapentin (NEURONTIN) 300 MG capsule   IBUPROFEN PO   ondansetron (ZOFRAN-ODT) 4 MG ODT tab   rizatriptan (MAXALT) 10 MG tablet         Review of Systems   Constitutional: Positive for appetite change. Negative for chills, fatigue and fever.   HENT: Negative for congestion.    Eyes: Positive for photophobia. Negative for visual disturbance.   Respiratory: Negative for chest tightness and shortness of breath.    Cardiovascular: Negative for chest pain.   Gastrointestinal: Positive for nausea. Negative for abdominal pain and vomiting.   Genitourinary: Negative for decreased urine volume.   Musculoskeletal: Negative for back pain.   Skin: Negative for rash.   Neurological: Positive for headaches. Negative for weakness, light-headedness and numbness.   All other systems reviewed and are negative.      Physical Exam   BP: (!) 139/97  Pulse: 86  Temp: 97.6  F (36.4  C)  Resp: 16  Height: 157.5 cm (5' 2\")  Weight: 68 kg (150 lb)  SpO2: 99 %      Physical Exam   Constitutional: She is oriented to person, place, and time. She appears well-developed and well-nourished. No distress.   HENT:   Head: Normocephalic and atraumatic.   Mouth/Throat: Oropharynx is clear and moist.   Eyes: Pupils are equal, round, and reactive to light. Conjunctivae are " normal.   Cardiovascular: Normal rate.   Pulmonary/Chest: Effort normal.   Abdominal: Soft. There is no tenderness.   Neurological: She is alert and oriented to person, place, and time. No cranial nerve deficit or sensory deficit.   Skin: Skin is warm and dry. Capillary refill takes less than 2 seconds.   Psychiatric: She has a normal mood and affect.   Nursing note and vitals reviewed.      ED Course        Procedures                 No results found for this or any previous visit (from the past 24 hour(s)).    Medications   lactated ringers BOLUS 1,000 mL (0 mLs Intravenous Stopped 8/10/19 0245)   ketorolac (TORADOL) injection 15 mg (15 mg Intravenous Given 8/10/19 0152)   prochlorperazine (COMPAZINE) injection 10 mg (10 mg Intravenous Given 8/10/19 0152)   diphenhydrAMINE (BENADRYL) injection 25 mg (25 mg Intravenous Given 8/10/19 0153)   dexamethasone PF (DECADRON) injection 10 mg (10 mg Intravenous Given 8/10/19 0152)     2:42 AM Patient re-assessed: Feeling much better.  Would like to go home to sleep.    Assessments & Plan (with Medical Decision Making)  28-year-old female with history of migraine headaches presenting for evaluation of persistent migraine-like headache over the past 2 weeks.  Well-appearing in the ED and afebrile with no concerning red flag symptoms.  Treated for migraine headache with medications as above.  Symptoms dramatically improved and patient discharged home in improved condition.     I have reviewed the nursing notes.    I have reviewed the findings, diagnosis, plan and need for follow up with the patient.       Discharge Medication List as of 8/10/2019  2:46 AM          Final diagnoses:   Acute nonintractable headache, unspecified headache type       8/10/2019   [facility] EMERGENCY DEPARTMENT     Rosario, Richar Garrison MD  08/10/19 0328

## 2019-08-15 ENCOUNTER — TELEPHONE (OUTPATIENT)
Dept: FAMILY MEDICINE | Facility: CLINIC | Age: 29
End: 2019-08-15

## 2019-08-15 NOTE — LETTER
August 15, 2019      Laura BLANKENSHIP [facility]  [address] [facility] [address]        Dear Laura,     In order to ensure we are providing the best quality care, we have reviewed your chart and see that you are due for:  1.  An update for the asthma and depression questionnaires.  These tools help your provider to monitor and manage your symptoms and treatment plan.  Please complete the enclosed forms and return in the provided envelope.    2.  A pap smear for cervical cancer screening.  If you have had this done at another facility please contact the clinic and we will update our records.    Please call the clinic at your earliest convenience to schedule an appointment.    Thank you for trusting us with your health care.  Sincerely,    Your [facility] Team/[email or web address]

## 2019-08-15 NOTE — TELEPHONE ENCOUNTER
Panel Management Review      Patient has the following on her problem list:     Depression / Dysthymia review    Measure:  Needs PHQ-9 score of 4 or less during index window.  Administer PHQ-9 and if score is 5 or more, send encounter to provider for next steps.        PHQ-9 SCORE 3/23/2017 3/27/2019 5/13/2019   PHQ-9 Total Score - - -   PHQ-9 Total Score 25 8 16       If PHQ-9 recheck is 5 or more, route to provider for next steps.    Patient is due for:  PHQ9    Asthma review     ACT Total Scores 5/13/2019   ACT TOTAL SCORE -   ASTHMA ER VISITS -   ASTHMA HOSPITALIZATIONS -   ACT TOTAL SCORE (Goal Greater than or Equal to 20) 19   In the past 12 months, how many times did you visit the emergency room for your asthma without being admitted to the hospital? 0   In the past 12 months, how many times were you hospitalized overnight because of your asthma? 0      1. Is Asthma diagnosis on the Problem List? Yes    2. Is Asthma listed on Health Maintenance? Yes    3. Patient is due for:  ACT      Composite cancer screening  Chart review shows that this patient is due/due soon for the following Pap Smear  Summary:    Patient is due/failing the following:   ACT, PAP and PHQ9    Action needed:   Patient needs office visit for pap., Patient needs to do ACT. and Patient needs to do PHQ9.    Type of outreach:    Letter mailed with recommendations and act, phq/bryson to complete and return.    Questions for provider review:    None                                                                                                                                    DUSTIN Mike MA

## 2019-10-22 ENCOUNTER — TELEPHONE (OUTPATIENT)
Dept: FAMILY MEDICINE | Facility: CLINIC | Age: 29
End: 2019-10-22

## 2019-10-22 NOTE — TELEPHONE ENCOUNTER
Panel Management Review      Patient has the following on her problem list:   Patient Active Problem List   Diagnosis     Temporomandibular joint disorder     Congenital vascular nevus     Psoriasis     CARDIOVASCULAR SCREENING; LDL GOAL LESS THAN 130     GERD (gastroesophageal reflux disease)     Intermittent asthma     Sinus tachycardia     Genital warts     Migraine without status migrainosus, not intractable, unspecified migraine type     KERRY (generalized anxiety disorder)     mirena IUD       Composite cancer screening  Chart review shows that this patient is due/due soon for the following   Health Maintenance   Topic Date Due     PREVENTIVE CARE VISIT  1990     ASTHMA ACTION PLAN  04/07/2017     PAP  07/07/2019     INFLUENZA VACCINE (1) 09/01/2019     ASTHMA CONTROL TEST  11/13/2019     PHQ-9  11/13/2019     DTAP/TDAP/TD IMMUNIZATION (4 - Td) 11/17/2026     HIV SCREENING  Completed     DEPRESSION ACTION PLAN  Completed     IPV IMMUNIZATION  Aged Out     MENINGITIS IMMUNIZATION  Aged Out     Summary:    Patient is due/failing the following:   PAP and PHYSICAL    Action needed:   Patient needs office visit.    Type of outreach:    Sent letter.    Questions for provider review:    None                                                                                                                                    Blanche Cruz CMA    Chart routed to none .

## 2019-10-22 NOTE — LETTER
[facility]  [address]  [facility] [address]  Phone: [phone]      10/22/2019     Laura BLANKENSHIP [facility]  [address] [facility] [address]      Dear Laura:    Here at [facility], we care about your health and have reviewed your chart in order to best serve your health care needs. Based on this review, it is recommended that you complete the following:    You are due for an annual preventative physical with a pap screening. Please contact the clinic scheduling desk at [phone] to schedule this appointment with the provider of your choice . A pap test is used to detect cervical cancer. The test should be taken at least once every three years but women who are at a greater risk for cervical cancer may need to have the test more often. Recommended every three years for women 21 and older.     You are at a greater risk for cervical cancer if:   - You have had a sexually transmitted disease   - You have had more than one sex partner   - You have had an abnormal pap test in the past    If you have any additional questions or concerns, or if you have had testing done elsewhere, please notify your clinic. Thank you for trusting [facility] and we appreciate the opportunity to serve you. We look forward to supporting your healthcare needs soon.    If you have a My-Chart Account, you also can schedule this appointment through there.      Sincerely,      Rea BUCK CNP/Blanche Cruz MA

## 2019-11-03 ENCOUNTER — HEALTH MAINTENANCE LETTER (OUTPATIENT)
Age: 29
End: 2019-11-03

## 2019-11-06 ENCOUNTER — OFFICE VISIT (OUTPATIENT)
Dept: FAMILY MEDICINE | Facility: CLINIC | Age: 29
End: 2019-11-06
Payer: COMMERCIAL

## 2019-11-06 VITALS
OXYGEN SATURATION: 98 % | TEMPERATURE: 99.2 F | DIASTOLIC BLOOD PRESSURE: 76 MMHG | WEIGHT: 167 LBS | HEIGHT: 63 IN | HEART RATE: 90 BPM | BODY MASS INDEX: 29.59 KG/M2 | SYSTOLIC BLOOD PRESSURE: 112 MMHG

## 2019-11-06 DIAGNOSIS — G43.719 INTRACTABLE CHRONIC MIGRAINE WITHOUT AURA AND WITHOUT STATUS MIGRAINOSUS: ICD-10-CM

## 2019-11-06 DIAGNOSIS — M54.2 NECK PAIN: Primary | ICD-10-CM

## 2019-11-06 DIAGNOSIS — J45.20 MILD INTERMITTENT ASTHMA WITHOUT COMPLICATION: ICD-10-CM

## 2019-11-06 DIAGNOSIS — F32.1 CURRENT MODERATE EPISODE OF MAJOR DEPRESSIVE DISORDER WITHOUT PRIOR EPISODE (H): ICD-10-CM

## 2019-11-06 DIAGNOSIS — G43.009 MIGRAINE WITHOUT AURA AND WITHOUT STATUS MIGRAINOSUS, NOT INTRACTABLE: ICD-10-CM

## 2019-11-06 PROCEDURE — 99214 OFFICE O/P EST MOD 30 MIN: CPT | Performed by: FAMILY MEDICINE

## 2019-11-06 RX ORDER — RIZATRIPTAN BENZOATE 10 MG/1
10 TABLET ORAL
Qty: 10 TABLET | Refills: 3 | Status: SHIPPED | OUTPATIENT
Start: 2019-11-06 | End: 2020-04-10

## 2019-11-06 RX ORDER — ALBUTEROL SULFATE 90 UG/1
2 AEROSOL, METERED RESPIRATORY (INHALATION) EVERY 6 HOURS PRN
Qty: 18 G | Refills: 3 | Status: SHIPPED | OUTPATIENT
Start: 2019-11-06

## 2019-11-06 RX ORDER — GABAPENTIN 300 MG/1
300 CAPSULE ORAL AT BEDTIME
Qty: 90 CAPSULE | Refills: 3 | Status: ON HOLD | OUTPATIENT
Start: 2019-11-06 | End: 2020-07-22

## 2019-11-06 ASSESSMENT — ANXIETY QUESTIONNAIRES
7. FEELING AFRAID AS IF SOMETHING AWFUL MIGHT HAPPEN: NOT AT ALL
2. NOT BEING ABLE TO STOP OR CONTROL WORRYING: NEARLY EVERY DAY
6. BECOMING EASILY ANNOYED OR IRRITABLE: NEARLY EVERY DAY
1. FEELING NERVOUS, ANXIOUS, OR ON EDGE: NEARLY EVERY DAY
3. WORRYING TOO MUCH ABOUT DIFFERENT THINGS: NEARLY EVERY DAY
5. BEING SO RESTLESS THAT IT IS HARD TO SIT STILL: NEARLY EVERY DAY
GAD7 TOTAL SCORE: 17

## 2019-11-06 ASSESSMENT — PATIENT HEALTH QUESTIONNAIRE - PHQ9
5. POOR APPETITE OR OVEREATING: MORE THAN HALF THE DAYS
SUM OF ALL RESPONSES TO PHQ QUESTIONS 1-9: 12

## 2019-11-06 ASSESSMENT — MIFFLIN-ST. JEOR: SCORE: 1465.6

## 2019-11-06 NOTE — PROGRESS NOTES
Subjective     Laura Grewal is a 28 year old female who presents to clinic today for the following health issues:  Chief Complaint   Patient presents with     Depression     med refill needed     Asthma     med refill needed     Neck Pain     has a long issue with her neck. Flare of pain the past 2 weeks. Does not have the medication she has used in the past. The most recent pain is causing migraine-like headache.     Health Maintenance     declined flu vaccine when offered       HPI   Depression Followup    How are you doing with your depression since your last visit? Worsened  She has been out of her medications for 2 months due to insurance.  Now has insurance and needs her medications refilled.  No prior side effects    Are you having other symptoms that might be associated with depression? No    Have you had a significant life event?  OTHER: new job-anxiety     Are you feeling anxious or having panic attacks?   Yes:  .    Do you have any concerns with your use of alcohol or other drugs? No    Social History     Tobacco Use     Smoking status: Never Smoker     Smokeless tobacco: Never Used   Substance Use Topics     Alcohol use: Yes     Alcohol/week: 0.0 standard drinks     Comment: very little     Drug use: No     PHQ 3/23/2017 3/27/2019 5/13/2019   PHQ-9 Total Score 25 8 16   Q9: Thoughts of better off dead/self-harm past 2 weeks Several days Not at all Not at all     KERRY-7 SCORE 6/9/2016 3/10/2017 5/13/2019   Total Score - - -   Total Score 21 20 14           Suicide Assessment Five-step Evaluation and Treatment (SAFE-T)    Asthma Follow-Up    Was ACT completed today?    Yes    ACT Total Scores 11/6/2019   ACT TOTAL SCORE -   ASTHMA ER VISITS -   ASTHMA HOSPITALIZATIONS -   ACT TOTAL SCORE (Goal Greater than or Equal to 20) 19   In the past 12 months, how many times did you visit the emergency room for your asthma without being admitted to the hospital? 0   In the past 12 months, how many times were you  "hospitalized overnight because of your asthma? 0       How many days per week do you miss taking your asthma controller medication?  I do not have an asthma controller medication    Please describe any recent triggers for your asthma: cold air    Have you had any Emergency Room Visits, Urgent Care Visits, or Hospital Admissions since your last office visit?  No      How many servings of fruits and vegetables do you eat daily?  0-1    On average, how many sweetened beverages do you drink each day (soda, juice, sweet tea, etc)?   1  How many days per week do you miss taking your medication? Has been out of her meds for one or two months due to insurance issues      Neck pain.  Did not go to PT in the past, would like to go and needs refill of neurotin, it seemed to help.         Reviewed and updated as needed this visit by Provider  Tobacco  Allergies  Meds  Problems  Med Hx  Surg Hx  Fam Hx         Review of Systems   ROS COMP: CONSTITUTIONAL:NEGATIVE for fever, chills, change in weight  INTEGUMENTARY/SKIN: NEGATIVE for worrisome rashes, moles or lesions  RESP:needs refill of albuterol for winter  CV: NEGATIVE for chest pain, palpitations or peripheral edema  GI: NEGATIVE for nausea, abdominal pain, heartburn, or change in bowel habits  MUSCULOSKELETAL: neck apin  NEURO: pain radiates to right shoulder  PSYCHIATRIC: as above      Objective    /76 (Cuff Size: Adult Large)   Pulse 90   Temp 99.2  F (37.3  C) (Tympanic)   Ht 1.607 m (5' 3.25\")   Wt 75.8 kg (167 lb)   LMP 10/10/2019 (Approximate)   SpO2 98%   Breastfeeding? No   BMI 29.35 kg/m    Body mass index is 29.35 kg/m .  Physical Exam   GENERAL APPEARANCE: alert, no distress and cooperative  NECK: pain at base of her neck  RESP: lungs clear to auscultation - no rales, rhonchi or wheezes  CV: regular rates and rhythm, normal S1 S2, no S3 or S4 and no murmur, click or rub  MS: extremities normal- no gross deformities noted  SKIN: no suspicious " "lesions or rashes  NEURO: Normal strength and tone, mentation intact and speech normal  PSYCH: mentation appears normal and affect normal/bright            Assessment & Plan     (M54.2) Neck pain  (primary encounter diagnosis)  Comment: restart med and go to PT  Plan: PHYSICAL THERAPY REFERRAL            (G43.529) Intractable chronic migraine without aura and without status migrainosus  Comment: refilled med  Plan: gabapentin (NEURONTIN) 300 MG capsule            (F32.1) Current moderate episode of major depressive disorder without prior episode (H)  Comment: restart med and follow up in 6 weeks with phone visit  Plan: FLUoxetine (PROZAC) 20 MG capsule            (G43.009) Migraine without aura and without status migrainosus, not intractable  Comment: refilled med  Plan: rizatriptan (MAXALT) 10 MG tablet            (J45.20) Mild intermittent asthma without complication  Comment: refilled med  Plan: albuterol (PROAIR HFA/PROVENTIL HFA/VENTOLIN         HFA) 108 (90 Base) MCG/ACT inhaler               BMI:   Estimated body mass index is 29.35 kg/m  as calculated from the following:    Height as of this encounter: 1.607 m (5' 3.25\").    Weight as of this encounter: 75.8 kg (167 lb).           See Patient Instructions    Return in about 6 weeks (around 12/18/2019).    Ramu Arevalo MD  [facility]      "

## 2019-11-06 NOTE — PATIENT INSTRUCTIONS
I refilled your medications.    Please go to physical therapy for your neck.    Please do a phone visit in 6 weeks .          Thank you for choosing [facility].  You may be receiving an email and/or telephone survey request from [facility] [employer] regarding your visit today.  Please take a few minutes to respond to the survey to let us know how we are doing.      If you have questions or concerns, please contact us via B[employer] or you can contact your care team at [phone].    Our Clinic hours are:  Monday 6:40 am  to 7:00 pm  Tuesday -Friday 6:40 am to 5:00 pm    The [place] outpatient lab hours are:  Monday - Friday 6:10 am to 4:45 pm  Saturdays 7:00 am to 11:00 am  Appointments are required, call [phone]    If you have clinical questions after hours or would like to schedule an appointment,  call the clinic at [phone].

## 2019-11-07 ASSESSMENT — ASTHMA QUESTIONNAIRES: ACT_TOTALSCORE: 19

## 2019-11-07 ASSESSMENT — ANXIETY QUESTIONNAIRES: GAD7 TOTAL SCORE: 17

## 2019-11-20 ENCOUNTER — HOSPITAL ENCOUNTER (OUTPATIENT)
Dept: PHYSICAL THERAPY | Facility: CLINIC | Age: 29
Setting detail: THERAPIES SERIES
End: 2019-11-20
Attending: FAMILY MEDICINE
Payer: COMMERCIAL

## 2019-11-20 DIAGNOSIS — M54.2 NECK PAIN: ICD-10-CM

## 2019-11-20 PROCEDURE — 97140 MANUAL THERAPY 1/> REGIONS: CPT | Mod: GP | Performed by: PHYSICAL THERAPIST

## 2019-11-20 PROCEDURE — 97161 PT EVAL LOW COMPLEX 20 MIN: CPT | Mod: GP | Performed by: PHYSICAL THERAPIST

## 2019-11-20 PROCEDURE — 97110 THERAPEUTIC EXERCISES: CPT | Mod: GP | Performed by: PHYSICAL THERAPIST

## 2019-11-20 NOTE — PROGRESS NOTES
[facility]          OUTPATIENT PHYSICAL THERAPY ORTHOPEDIC EVALUATION  PLAN OF TREATMENT FOR OUTPATIENT REHABILITATION  (COMPLETE FOR INITIAL CLAIMS ONLY)  Patient's Last Name, First Name, M.I.  YOB: 1990  Laura Grewal    Provider s Name:  [facility]   Medical Record No.  [id number]   Start of Care Date:  11/20/19   Onset Date:  11/04/19   Type:     _X__PT   ___OT   ___SLP Medical Diagnosis:  cervical pain and headaches     PT Diagnosis:  cervical pain,tightness, headaches   Visits from SOC:  1      _________________________________________________________________________________  Plan of Treatment/Functional Goals:  stretching, strengthening, manual therapy           Goals     Goal Description: pt will no longer have daily migraine/HA at only 2-3/week  Target Date: 01/01/20       Goal Description: pt will be down to 1-2migraines per month sin 6wk  Target Date: 01/01/20           Therapy Frequency:  2 times/Week  Predicted Duration of Therapy Intervention:  1-2x/wk x6wks    Kris Hoenk, PT                 I CERTIFY THE NEED FOR THESE SERVICES FURNISHED UNDER        THIS PLAN OF TREATMENT AND WHILE UNDER MY CARE     (Physician co-signature of this document indicates review and certification of the therapy plan).                       Certification Date From:  11/20/19   Certification Date To:  01/01/20    Referring Provider:  DR Arevalo    Initial Assessment        See Epic Evaluation Start of Care Date: 11/20/19
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
## 2019-11-27 ENCOUNTER — HOSPITAL ENCOUNTER (OUTPATIENT)
Dept: PHYSICAL THERAPY | Facility: CLINIC | Age: 29
Setting detail: THERAPIES SERIES
End: 2019-11-27
Attending: FAMILY MEDICINE
Payer: COMMERCIAL

## 2019-11-27 PROCEDURE — 97140 MANUAL THERAPY 1/> REGIONS: CPT | Mod: GP | Performed by: PHYSICAL THERAPIST

## 2019-12-18 ENCOUNTER — VIRTUAL VISIT (OUTPATIENT)
Dept: FAMILY MEDICINE | Facility: CLINIC | Age: 29
End: 2019-12-18
Payer: COMMERCIAL

## 2019-12-18 DIAGNOSIS — Z53.9 ERRONEOUS ENCOUNTER--DISREGARD: Primary | ICD-10-CM

## 2019-12-18 NOTE — PROGRESS NOTES
Laura Grewal is a 28 year old female who is being evaluated via a billable telephone visit.      Consent has been obtained for this service by 1 care team member: . See the scanned image in the medical record.    Laura Grewal complains of    Chief Complaint   Patient presents with     Depression     follow up new start prozac on 11/6/19     Asthma     follow up ACT was 19 on 11/6/19. Her inhaler was refilled.       I have reviewed and updated the patient's Past Medical History, Social History, Family History and Medication List.    ALLERGIES  Sertraline; Tramadol; Zoloft [serotonin reuptake inhibitors]; and Methylergonovine     (MA signature)    Depression and Anxiety Follow-Up    How are you doing with your depression since your last visit?     How are you doing with your anxiety since your last visit?      Are you having other symptoms that might be associated with depression or anxiety?     Have you had a significant life event?      Do you have any concerns with your use of alcohol or other drugs?     Social History     Tobacco Use     Smoking status: Never Smoker     Smokeless tobacco: Never Used   Substance Use Topics     Alcohol use: Yes     Alcohol/week: 0.0 standard drinks     Comment: very little     Drug use: No     PHQ 3/27/2019 5/13/2019 11/6/2019   PHQ-9 Total Score 8 16 12   Q9: Thoughts of better off dead/self-harm past 2 weeks Not at all Not at all Not at all     KERRY-7 SCORE 3/10/2017 5/13/2019 11/6/2019   Total Score - - -   Total Score 20 14 17           Suicide Assessment Five-step Evaluation and Treatment (SAFE-T)    Asthma Follow-Up    Was ACT completed today?        How many servings of fruits and vegetables do you eat daily?  On average, how many sweetened beverages do you drink each day (Examples: soda, juice, sweet tea, etc.  Do NOT count diet or artificially sweetened beverages)?       How many days per week do you miss taking your medication?        Additional provider notes:        Assessment/Plan:      I have reviewed the note as documented above.  This accurately captures the substance of my conversation with the patient.      Total time of call between patient and provider was  minutes     Called and there was no answer.  Left message to call back and if not within the time frame to reschedule.  Ramu Arevalo MD  Family Medicine

## 2020-01-15 NOTE — PROGRESS NOTES
Laura [facility]   PHYSICAL THERAPY DISCHARGE  11/27/19 1300   Signing Clinician's Name / Credentials   Signing clinician's name / credentials Kris Hoenk, PT   Session Number   Session Number 2 blue plus MA, needs cert   Ortho Goal 1   Goal Description pt will no longer have daily migraine/HA at only 2-3/week  (not met)   Target Date 01/01/20   Ortho Goal 2   Goal Description pt will be down to 1-2migraines per month sin 6wk   Target Date 01/01/20   Subjective Report   Subjective Report super tight, pain, HA daily all day - states they are migraine, HA right now 3-4/10 took her migraine med earlier   Objective Measures   Objective Measures Objective Measure 1   Objective Measure 1   Details R up trap,levator extremely tight, tender   Therapeutic Procedure/exercise   Therapeutic Procedures: strength, endurance, ROM, flexibillity minutes (68602) 5   Skilled Intervention UB strength ex   Treatment Detail YTB exten and rows x12   Patient Response good form   Manual Therapy   Manual Therapy: Mobilization, MFR, MLD, friction massage minutes (04781) 25   Skilled Intervention STM, jt mob for mobility, pain   Treatment Detail seated R rib mob 1-2, 4, prone PA T3-6 B TP's, costovert PA B 1-5, STM R up trap,levator, scalene, supine STM suboccip, post cerv, L SCM>R, PA C1-2, sideglide C3 B, suboccip release   Progress did supine first , then prone   Patient Response tight, tender R up trap   Plan   Plan for next session cont 1-2x/wk, suggest trying 2x due to tightness  Patient has not been seen in over 30 days and will be discharged at this time.  Final status not known.     Total Session Time   Timed Code Treatment Minutes 30   Total Treatment Time (sum of timed and untimed services) 30   Kris Hoenk, PT #[id number]  [facility]  [phone]

## 2020-02-03 ENCOUNTER — HOSPITAL ENCOUNTER (EMERGENCY)
Facility: CLINIC | Age: 30
Discharge: HOME | End: 2020-02-04
Attending: EMERGENCY MEDICINE | Admitting: EMERGENCY MEDICINE
Payer: COMMERCIAL

## 2020-02-03 DIAGNOSIS — R11.10 VOMITING AND DIARRHEA: ICD-10-CM

## 2020-02-03 DIAGNOSIS — R19.7 VOMITING AND DIARRHEA: ICD-10-CM

## 2020-02-03 LAB
ALBUMIN SERPL-MCNC: 4 G/DL (ref 3.4–5)
ALP SERPL-CCNC: 144 U/L (ref 40–150)
ALT SERPL W P-5'-P-CCNC: 75 U/L (ref 0–50)
ANION GAP SERPL CALCULATED.3IONS-SCNC: 7 MMOL/L (ref 3–14)
AST SERPL W P-5'-P-CCNC: 34 U/L (ref 0–45)
BASOPHILS # BLD AUTO: 0 10E9/L (ref 0–0.2)
BASOPHILS NFR BLD AUTO: 0.3 %
BILIRUB SERPL-MCNC: 0.6 MG/DL (ref 0.2–1.3)
BUN SERPL-MCNC: 14 MG/DL (ref 7–30)
CALCIUM SERPL-MCNC: 9.1 MG/DL (ref 8.5–10.1)
CHLORIDE SERPL-SCNC: 107 MMOL/L (ref 94–109)
CO2 SERPL-SCNC: 24 MMOL/L (ref 20–32)
CREAT SERPL-MCNC: 0.72 MG/DL (ref 0.52–1.04)
DIFFERENTIAL METHOD BLD: ABNORMAL
EOSINOPHIL # BLD AUTO: 0 10E9/L (ref 0–0.7)
EOSINOPHIL NFR BLD AUTO: 0.2 %
ERYTHROCYTE [DISTWIDTH] IN BLOOD BY AUTOMATED COUNT: 13.2 % (ref 10–15)
GFR SERPL CREATININE-BSD FRML MDRD: >90 ML/MIN/{1.73_M2}
GLUCOSE SERPL-MCNC: 107 MG/DL (ref 70–99)
HCG UR QL: NEGATIVE
HCT VFR BLD AUTO: 44.5 % (ref 35–47)
HGB BLD-MCNC: 14.5 G/DL (ref 11.7–15.7)
IMM GRANULOCYTES # BLD: 0 10E9/L (ref 0–0.4)
IMM GRANULOCYTES NFR BLD: 0.2 %
LYMPHOCYTES # BLD AUTO: 0.7 10E9/L (ref 0.8–5.3)
LYMPHOCYTES NFR BLD AUTO: 5.9 %
MCH RBC QN AUTO: 28.2 PG (ref 26.5–33)
MCHC RBC AUTO-ENTMCNC: 32.6 G/DL (ref 31.5–36.5)
MCV RBC AUTO: 86 FL (ref 78–100)
MONOCYTES # BLD AUTO: 0.4 10E9/L (ref 0–1.3)
MONOCYTES NFR BLD AUTO: 3.5 %
NEUTROPHILS # BLD AUTO: 10.2 10E9/L (ref 1.6–8.3)
NEUTROPHILS NFR BLD AUTO: 89.9 %
NRBC # BLD AUTO: 0 10*3/UL
NRBC BLD AUTO-RTO: 0 /100
PLATELET # BLD AUTO: 360 10E9/L (ref 150–450)
POTASSIUM SERPL-SCNC: 3.8 MMOL/L (ref 3.4–5.3)
PROT SERPL-MCNC: 8.4 G/DL (ref 6.8–8.8)
RBC # BLD AUTO: 5.15 10E12/L (ref 3.8–5.2)
SODIUM SERPL-SCNC: 138 MMOL/L (ref 133–144)
WBC # BLD AUTO: 11.3 10E9/L (ref 4–11)

## 2020-02-03 PROCEDURE — 80053 COMPREHEN METABOLIC PANEL: CPT | Performed by: STUDENT IN AN ORGANIZED HEALTH CARE EDUCATION/TRAINING PROGRAM

## 2020-02-03 PROCEDURE — 99284 EMERGENCY DEPT VISIT MOD MDM: CPT | Mod: 25

## 2020-02-03 PROCEDURE — 96374 THER/PROPH/DIAG INJ IV PUSH: CPT

## 2020-02-03 PROCEDURE — 81003 URINALYSIS AUTO W/O SCOPE: CPT | Performed by: STUDENT IN AN ORGANIZED HEALTH CARE EDUCATION/TRAINING PROGRAM

## 2020-02-03 PROCEDURE — 25800030 ZZH RX IP 258 OP 636: Performed by: STUDENT IN AN ORGANIZED HEALTH CARE EDUCATION/TRAINING PROGRAM

## 2020-02-03 PROCEDURE — 25000128 H RX IP 250 OP 636: Performed by: STUDENT IN AN ORGANIZED HEALTH CARE EDUCATION/TRAINING PROGRAM

## 2020-02-03 PROCEDURE — 81025 URINE PREGNANCY TEST: CPT | Performed by: STUDENT IN AN ORGANIZED HEALTH CARE EDUCATION/TRAINING PROGRAM

## 2020-02-03 PROCEDURE — 99284 EMERGENCY DEPT VISIT MOD MDM: CPT | Mod: Z6 | Performed by: EMERGENCY MEDICINE

## 2020-02-03 PROCEDURE — 96361 HYDRATE IV INFUSION ADD-ON: CPT

## 2020-02-03 PROCEDURE — 85025 COMPLETE CBC W/AUTO DIFF WBC: CPT | Performed by: STUDENT IN AN ORGANIZED HEALTH CARE EDUCATION/TRAINING PROGRAM

## 2020-02-03 RX ORDER — ONDANSETRON 2 MG/ML
4 INJECTION INTRAMUSCULAR; INTRAVENOUS EVERY 30 MIN PRN
Status: DISCONTINUED | OUTPATIENT
Start: 2020-02-03 | End: 2020-02-04 | Stop reason: HOSPADM

## 2020-02-03 RX ORDER — SODIUM CHLORIDE, SODIUM LACTATE, POTASSIUM CHLORIDE, CALCIUM CHLORIDE 600; 310; 30; 20 MG/100ML; MG/100ML; MG/100ML; MG/100ML
INJECTION, SOLUTION INTRAVENOUS ONCE
Status: COMPLETED | OUTPATIENT
Start: 2020-02-03 | End: 2020-02-04

## 2020-02-03 RX ADMIN — ONDANSETRON 4 MG: 2 INJECTION INTRAMUSCULAR; INTRAVENOUS at 22:50

## 2020-02-03 RX ADMIN — SODIUM CHLORIDE, POTASSIUM CHLORIDE, SODIUM LACTATE AND CALCIUM CHLORIDE: 600; 310; 30; 20 INJECTION, SOLUTION INTRAVENOUS at 22:45

## 2020-02-03 NOTE — ED AVS SNAPSHOT
[facility] Emergency Department  [address] [address]  Phone:  [phone]  Fax:  [phone]                                    Laura Grewal   MRN: [id number]    Department:  [facility] Emergency Department   Date of Visit:  2/3/2020           After Visit Summary Signature Page    I have received my discharge instructions, and my questions have been answered. I have discussed any challenges I see with this plan with the nurse or doctor.    ..........................................................................................................................................  Patient/Patient Representative Signature      ..........................................................................................................................................  Patient Representative Print Name and Relationship to Patient    ..................................................               ................................................  Date                                   Time    ..........................................................................................................................................  Reviewed by Signature/Title    ...................................................              ..............................................  Date                                               Time          22EPIC Rev 08/18

## 2020-02-04 VITALS
RESPIRATION RATE: 18 BRPM | TEMPERATURE: 98.5 F | DIASTOLIC BLOOD PRESSURE: 71 MMHG | OXYGEN SATURATION: 100 % | HEART RATE: 122 BPM | SYSTOLIC BLOOD PRESSURE: 110 MMHG | WEIGHT: 160 LBS | BODY MASS INDEX: 28.12 KG/M2

## 2020-02-04 LAB
ALBUMIN UR-MCNC: NEGATIVE MG/DL
APPEARANCE UR: CLEAR
BILIRUB UR QL STRIP: NEGATIVE
COLOR UR AUTO: YELLOW
GLUCOSE UR STRIP-MCNC: NEGATIVE MG/DL
HGB UR QL STRIP: NEGATIVE
KETONES UR STRIP-MCNC: 80 MG/DL
LEUKOCYTE ESTERASE UR QL STRIP: NEGATIVE
NITRATE UR QL: NEGATIVE
PH UR STRIP: 9 PH (ref 5–7)
SOURCE: ABNORMAL
SP GR UR STRIP: 1.02 (ref 1–1.03)
UROBILINOGEN UR STRIP-MCNC: 0 MG/DL (ref 0–2)

## 2020-02-04 PROCEDURE — 96361 HYDRATE IV INFUSION ADD-ON: CPT

## 2020-02-04 PROCEDURE — 96375 TX/PRO/DX INJ NEW DRUG ADDON: CPT

## 2020-02-04 PROCEDURE — 25000128 H RX IP 250 OP 636: Performed by: EMERGENCY MEDICINE

## 2020-02-04 PROCEDURE — 25800030 ZZH RX IP 258 OP 636: Performed by: EMERGENCY MEDICINE

## 2020-02-04 RX ORDER — LORAZEPAM 2 MG/ML
0.5 INJECTION INTRAMUSCULAR ONCE
Status: COMPLETED | OUTPATIENT
Start: 2020-02-04 | End: 2020-02-04

## 2020-02-04 RX ORDER — KETOROLAC TROMETHAMINE 30 MG/ML
30 INJECTION, SOLUTION INTRAMUSCULAR; INTRAVENOUS ONCE
Status: DISCONTINUED | OUTPATIENT
Start: 2020-02-04 | End: 2020-02-04 | Stop reason: CLARIF

## 2020-02-04 RX ORDER — ONDANSETRON 2 MG/ML
4 INJECTION INTRAMUSCULAR; INTRAVENOUS ONCE
Status: COMPLETED | OUTPATIENT
Start: 2020-02-04 | End: 2020-02-04

## 2020-02-04 RX ORDER — ONDANSETRON 4 MG/1
4 TABLET, ORALLY DISINTEGRATING ORAL EVERY 8 HOURS PRN
Qty: 10 TABLET | Refills: 0 | Status: ON HOLD | OUTPATIENT
Start: 2020-02-04 | End: 2020-07-22

## 2020-02-04 RX ORDER — KETOROLAC TROMETHAMINE 15 MG/ML
15 INJECTION, SOLUTION INTRAMUSCULAR; INTRAVENOUS ONCE
Status: COMPLETED | OUTPATIENT
Start: 2020-02-04 | End: 2020-02-04

## 2020-02-04 RX ADMIN — LORAZEPAM 0.5 MG: 2 INJECTION INTRAMUSCULAR; INTRAVENOUS at 00:41

## 2020-02-04 RX ADMIN — SODIUM CHLORIDE, POTASSIUM CHLORIDE, SODIUM LACTATE AND CALCIUM CHLORIDE 1000 ML: 600; 310; 30; 20 INJECTION, SOLUTION INTRAVENOUS at 00:19

## 2020-02-04 RX ADMIN — ONDANSETRON 4 MG: 2 INJECTION INTRAMUSCULAR; INTRAVENOUS at 00:20

## 2020-02-04 RX ADMIN — KETOROLAC TROMETHAMINE 15 MG: 15 INJECTION, SOLUTION INTRAMUSCULAR; INTRAVENOUS at 00:23

## 2020-02-04 ASSESSMENT — ENCOUNTER SYMPTOMS
VOMITING: 1
CHEST TIGHTNESS: 0
HEADACHES: 0
FATIGUE: 0
ABDOMINAL PAIN: 1
NAUSEA: 1
SHORTNESS OF BREATH: 0
CONSTIPATION: 0
COUGH: 0
APPETITE CHANGE: 0
BACK PAIN: 1
CHILLS: 0
DYSURIA: 0
DIARRHEA: 1
WEAKNESS: 0
FEVER: 0
LIGHT-HEADEDNESS: 0

## 2020-02-04 NOTE — ED PROVIDER NOTES
History     Chief Complaint   Patient presents with     Nausea, Vomiting, & Diarrhea     HPI  Laura Grewal is a 29 year old female with a history of anxiety presenting for evaluation of abrupt onset of severe generalized abdominal cramping with nausea and vomiting.  Symptoms began abruptly around 5 PM and have been persistent ever since.  She reports multiple episodes of vomiting and now dry heaving.  She also developed diarrhea roughly 3 hours after symptom onset and has had several watery stools without blood since.  No known sick contacts.  No known bad food exposure.  Patient continues to have diffuse abdominal cramping with nausea.  She also reports severe anxiety associated with her symptoms.  Denies fever or chills.  Denies any rashes.  Denies any injury.  Denies previous abdominal surgeries.    Allergies:  Allergies   Allergen Reactions     Sertraline Other (See Comments)     Headache, Migraines     Tramadol Other (See Comments)     Mood swings     Zoloft [Serotonin Reuptake Inhibitors] Other (See Comments)     Migraine Headaches     Methylergonovine Anxiety       Problem List:    Patient Active Problem List    Diagnosis Date Noted     mirena IUD 03/23/2017     Priority: Medium     Mirena IUD placed today by Mago Chaidez MD  LOT# [id number]  Exp:09/19       Migraine without status migrainosus, not intractable, unspecified migraine type 06/09/2016     Priority: Medium     KERRY (generalized anxiety disorder) 06/09/2016     Priority: Medium     Did not tolerate Zoloft.  Currently trying lexapro       Genital warts 12/09/2015     Priority: Medium     Sinus tachycardia 01/14/2015     Priority: Medium     Intermittent asthma 05/27/2014     Priority: Medium     GERD (gastroesophageal reflux disease) 06/10/2013     Priority: Medium     Psoriasis 10/16/2008     Priority: Medium     Has tried clindamycin, triamcinolone, and differin.  On nothing currently.       Congenital vascular nevus 05/24/2007     Priority:  Medium     Right forehead       Temporomandibular joint disorder 03/20/2007     Priority: Medium     right side       CARDIOVASCULAR SCREENING; LDL GOAL LESS THAN 130 01/31/2012     Priority: Low        Past Medical History:    Past Medical History:   Diagnosis Date     Acute sinusitis, unspecified      Acute upper respiratory infections of unspecified site      Chickenpox      Depression with anxiety 5/19/2012     Mild major depression (H) 5/19/2012     Unspecified otitis media        Past Surgical History:    Past Surgical History:   Procedure Laterality Date     MOUTH SURGERY      wisdom teeth       Family History:    Family History   Problem Relation Age of Onset     C.A.D. Maternal Grandmother      Asthma Maternal Grandmother      Heart Disease Maternal Grandmother         pacemaker     Dementia Maternal Grandfather      Cancer - colorectal Paternal Grandmother         colon     Cerebrovascular Disease Paternal Grandmother      Celiac Disease Sister      Coronary Artery Disease Paternal Grandfather         also pacemaker     Asthma Sister      Gastrointestinal Disease Sister         celiac       Social History:  Marital Status:  Single [1]  Social History     Tobacco Use     Smoking status: Never Smoker     Smokeless tobacco: Never Used   Substance Use Topics     Alcohol use: Yes     Alcohol/week: 0.0 standard drinks     Comment: very little     Drug use: No        Medications:    ondansetron (ZOFRAN ODT) 4 MG ODT tab  albuterol (PROAIR HFA/PROVENTIL HFA/VENTOLIN HFA) 108 (90 Base) MCG/ACT inhaler  FLUoxetine (PROZAC) 20 MG capsule  gabapentin (NEURONTIN) 300 MG capsule  IBUPROFEN PO  rizatriptan (MAXALT) 10 MG tablet          Review of Systems   Constitutional: Negative for appetite change, chills, fatigue and fever.   HENT: Negative for congestion.    Respiratory: Negative for cough, chest tightness and shortness of breath.    Cardiovascular: Negative for chest pain.   Gastrointestinal: Positive for abdominal  pain, diarrhea, nausea and vomiting. Negative for constipation.   Genitourinary: Positive for decreased urine volume. Negative for dysuria.   Musculoskeletal: Positive for back pain (aching pain).   Skin: Negative for rash.   Neurological: Negative for weakness, light-headedness and headaches.   All other systems reviewed and are negative.      Physical Exam   BP: 120/67  Pulse: 118  Temp: 97  F (36.1  C)  Resp: 20  Weight: 72.6 kg (160 lb)  SpO2: 100 %      Physical Exam  Vitals signs and nursing note reviewed.   Constitutional:       General: She is not in acute distress.     Appearance: She is not diaphoretic.      Comments: Anxious appearing   HENT:      Head: Normocephalic and atraumatic.      Nose: Nose normal.      Mouth/Throat:      Mouth: Mucous membranes are moist.   Eyes:      Conjunctiva/sclera: Conjunctivae normal.   Neck:      Musculoskeletal: Normal range of motion.   Cardiovascular:      Rate and Rhythm: Normal rate and regular rhythm.      Pulses: Normal pulses.   Pulmonary:      Effort: Pulmonary effort is normal.   Abdominal:      General: Abdomen is flat. Bowel sounds are increased. There is no distension.      Tenderness: There is no abdominal tenderness.   Musculoskeletal: Normal range of motion.   Skin:     General: Skin is warm and dry.      Capillary Refill: Capillary refill takes less than 2 seconds.   Neurological:      Mental Status: She is alert and oriented to person, place, and time.         ED Course        Procedures                   Results for orders placed or performed during the hospital encounter of 02/03/20 (from the past 24 hour(s))   CBC with platelets differential   Result Value Ref Range    WBC 11.3 (H) 4.0 - 11.0 10e9/L    RBC Count 5.15 3.8 - 5.2 10e12/L    Hemoglobin 14.5 11.7 - 15.7 g/dL    Hematocrit 44.5 35.0 - 47.0 %    MCV 86 78 - 100 fl    MCH 28.2 26.5 - 33.0 pg    MCHC 32.6 31.5 - 36.5 g/dL    RDW 13.2 10.0 - 15.0 %    Platelet Count 360 150 - 450 10e9/L    Diff  Method Automated Method     % Neutrophils 89.9 %    % Lymphocytes 5.9 %    % Monocytes 3.5 %    % Eosinophils 0.2 %    % Basophils 0.3 %    % Immature Granulocytes 0.2 %    Nucleated RBCs 0 0 /100    Absolute Neutrophil 10.2 (H) 1.6 - 8.3 10e9/L    Absolute Lymphocytes 0.7 (L) 0.8 - 5.3 10e9/L    Absolute Monocytes 0.4 0.0 - 1.3 10e9/L    Absolute Eosinophils 0.0 0.0 - 0.7 10e9/L    Absolute Basophils 0.0 0.0 - 0.2 10e9/L    Abs Immature Granulocytes 0.0 0 - 0.4 10e9/L    Absolute Nucleated RBC 0.0    Comprehensive metabolic panel   Result Value Ref Range    Sodium 138 133 - 144 mmol/L    Potassium 3.8 3.4 - 5.3 mmol/L    Chloride 107 94 - 109 mmol/L    Carbon Dioxide 24 20 - 32 mmol/L    Anion Gap 7 3 - 14 mmol/L    Glucose 107 (H) 70 - 99 mg/dL    Urea Nitrogen 14 7 - 30 mg/dL    Creatinine 0.72 0.52 - 1.04 mg/dL    GFR Estimate >90 >60 mL/min/[1.73_m2]    GFR Estimate If Black >90 >60 mL/min/[1.73_m2]    Calcium 9.1 8.5 - 10.1 mg/dL    Bilirubin Total 0.6 0.2 - 1.3 mg/dL    Albumin 4.0 3.4 - 5.0 g/dL    Protein Total 8.4 6.8 - 8.8 g/dL    Alkaline Phosphatase 144 40 - 150 U/L    ALT 75 (H) 0 - 50 U/L    AST 34 0 - 45 U/L   UA reflex to Microscopic   Result Value Ref Range    Color Urine Yellow     Appearance Urine Clear     Glucose Urine Negative NEG^Negative mg/dL    Bilirubin Urine Negative NEG^Negative    Ketones Urine 80 (A) NEG^Negative mg/dL    Specific Gravity Urine 1.020 1.003 - 1.035    Blood Urine Negative NEG^Negative    pH Urine 9.0 (H) 5.0 - 7.0 pH    Protein Albumin Urine Negative NEG^Negative mg/dL    Urobilinogen mg/dL 0.0 0.0 - 2.0 mg/dL    Nitrite Urine Negative NEG^Negative    Leukocyte Esterase Urine Negative NEG^Negative    Source Midstream Urine    HCG qualitative urine (UPT)   Result Value Ref Range    HCG Qual Urine Negative NEG^Negative       Medications   ondansetron (ZOFRAN) injection 4 mg (4 mg Intravenous Given 2/3/20 2250)   lactated ringers infusion ( Intravenous Stopped 2/4/20  0004)   lactated ringers BOLUS 1,000 mL (1,000 mLs Intravenous New Bag 2/4/20 0019)   ondansetron (ZOFRAN) injection 4 mg (4 mg Intravenous Given 2/4/20 0020)   ketorolac (TORADOL) injection 15 mg (15 mg Intravenous Given 2/4/20 0023)   LORazepam (ATIVAN) injection 0.5 mg (0.5 mg Intravenous Given 2/4/20 0041)     Patient Vitals for the past 24 hrs:   BP Temp Temp src Pulse Resp SpO2 Weight   02/04/20 0100 113/76 -- -- 122 -- 100 % --   02/04/20 0045 -- -- -- -- -- 98 % --   02/04/20 0030 117/75 -- -- 115 -- 100 % --   02/03/20 2219 120/67 97  F (36.1  C) Temporal 118 20 100 % 72.6 kg (160 lb)         1:52 AM Patient re-assessed: Feeling much better.  No further vomiting or diarrhea.  Still with achy back pain.  Feels comfortable going home.      Assessments & Plan (with Medical Decision Making)  29-year-old female with no significant contributing past medical history presenting for evaluation of acute onset of severe nausea, vomiting, diarrhea.  Symptoms began abruptly this evening and led to ongoing symptoms leading to presentation tonight.  Patient mildly tachycardic but not acutely ill-appearing.  Given IV fluids and medications for symptom management as above.  Patient counseled regarding symptomatic treatment for presumed nonspecific vomiting and diarrhea.  Return precautions given if new symptoms develop or symptoms are not improving after 24 to 48 hours.     I have reviewed the nursing notes.    I have reviewed the findings, diagnosis, plan and need for follow up with the patient.       New Prescriptions    ONDANSETRON (ZOFRAN ODT) 4 MG ODT TAB    Take 1 tablet (4 mg) by mouth every 8 hours as needed for nausea or vomiting       Final diagnoses:   Vomiting and diarrhea       2/3/2020   [facility] EMERGENCY DEPARTMENT     Rosario, Richar Garrison MD  02/04/20 2597

## 2020-02-13 ENCOUNTER — TELEPHONE (OUTPATIENT)
Dept: FAMILY MEDICINE | Facility: CLINIC | Age: 30
End: 2020-02-13

## 2020-02-13 NOTE — LETTER
February 13, 2020      Laura BLANKENSHIP [facility]  [address] [facility] [address]        Dear Laura,     Your [place] Care Team works hard to make sure that you and your family receive exceptional care. Enclosed you will find a copy of the Asthma Control Test (ACT) that our clinic uses to monitor and manage your asthma. This test is an assessment tool that we use to determine how well your asthma is controlled.  Please complete the enclosed form and return in the provided envelope.  Our records also indicate you are due for a pap for cervical cancer screening.  You can call [phone] to schedule this appointment.  If you have had this completed at another facility please contact the clinic and we will update our records.    Thank you for trusting us with your health care.    Sincerely,        Your [facility] Care Team/[email or web address]

## 2020-02-13 NOTE — TELEPHONE ENCOUNTER
Patient Quality Outreach Summary      Summary:    Patient is due/failing the following:   ACT needed and Cervical Cancer Screening - PAP Needed    Type of outreach:    Sent letter.   Included ACT for the patient to complete and return.    Questions for provider review:    None                                                                                                                    DUSTIN Mike MA

## 2020-02-27 NOTE — ED AVS SNAPSHOT
[facility] Emergency Department    [address] [address]    Phone:  [phone]    Fax:  [phone]                                       Laura Grewal   MRN: [id number]    Department:  [facility] Emergency Department   Date of Visit:  11/8/2018           After Visit Summary Signature Page     I have received my discharge instructions, and my questions have been answered. I have discussed any challenges I see with this plan with the nurse or doctor.    ..........................................................................................................................................  Patient/Patient Representative Signature      ..........................................................................................................................................  Patient Representative Print Name and Relationship to Patient    ..................................................               ................................................  Date                                   Time    ..........................................................................................................................................  Reviewed by Signature/Title    ...................................................              ..............................................  Date                                               Time          22EPIC Rev 08/18         spouse  will assist as needed, home PT for strength, gait and balance training/home w/ home PT/home w/ assist

## 2020-03-24 ENCOUNTER — TELEPHONE (OUTPATIENT)
Dept: FAMILY MEDICINE | Facility: CLINIC | Age: 30
End: 2020-03-24

## 2020-03-24 NOTE — TELEPHONE ENCOUNTER
Reason for call:    Symptom or request:     Patient called stating that she was treating with Dr Arevalo due to a work injury neck pain. Reports it has gotten worst due to no physical therapy.       Best Time:  any    Can we leave a detailed message on this number?  YES     Valerie CHAN  Station

## 2020-03-24 NOTE — TELEPHONE ENCOUNTER
S-(situation): Increased neck pain.     B-(background): Last Office visit: 11/6/19 for neck pain.   Last Physical therapy treatment 11/20/19.     A-(assessment): Patient states she has been going to physical therapy at [place] regularly, no encounter since 11/27/19 in [facility].   Patient states her neck is getting more sore and stiff with work. Doing home exercises, a few exercises only.       R-(recommendations): Patient negative for COVID-19 screening.   Is it ok to schedule an Office visit?   Patient would like sent to [facility]

## 2020-03-25 NOTE — TELEPHONE ENCOUNTER
CSS: please deliver message and assist with in-clinic appt.     Attempted to contact patient. No answer/no voicemail.

## 2020-03-27 NOTE — TELEPHONE ENCOUNTER
3rd attempt to contact, no answer, no voicemail.   Will close encounter at this time.     Clinic [facility] [place] cortney to deliver message and schedule Office visit. Will need to be re triaged for COVID -symptoms.

## 2020-04-10 DIAGNOSIS — G43.009 MIGRAINE WITHOUT AURA AND WITHOUT STATUS MIGRAINOSUS, NOT INTRACTABLE: ICD-10-CM

## 2020-04-10 RX ORDER — RIZATRIPTAN BENZOATE 10 MG/1
TABLET ORAL
Qty: 10 TABLET | Refills: 5 | Status: SHIPPED | OUTPATIENT
Start: 2020-04-10

## 2020-04-10 NOTE — TELEPHONE ENCOUNTER
"Requested Prescriptions   Pending Prescriptions Disp Refills     rizatriptan (MAXALT) 10 MG tablet [Pharmacy Med Name: Rizatriptan Benzoate 10 MG Oral Tablet] 10 tablet 0     Sig: TAKE ONE TABLET BY MOUTH AT ONSET OF HEADACHE FOR MIGRAINE       Serotonin Agonists Failed - 4/10/2020 12:04 PM        Failed - Serotonin Agonist request needs review.     Please review patient's record. If patient has had 8 or more treatments in the past month, please forward to provider.          Passed - Blood pressure under 140/90 in past 12 months     BP Readings from Last 3 Encounters:   02/04/20 110/71   11/06/19 112/76   08/10/19 132/88                 Passed - Recent (12 mo) or future (30 days) visit within the authorizing provider's specialty     Patient has had an office visit with the authorizing provider or a provider within the authorizing providers department within the previous 12 mos or has a future within next 30 days. See \"Patient Info\" tab in inbasket, or \"Choose Columns\" in Meds & Orders section of the refill encounter.              Passed - Medication is active on med list        Passed - Patient is age 18 or older        Passed - No active pregnancy on record        Passed - No positive pregnancy test in past 12 months             "

## 2020-04-13 ENCOUNTER — PATIENT OUTREACH (OUTPATIENT)
Dept: CARE COORDINATION | Facility: CLINIC | Age: 30
End: 2020-04-13

## 2020-04-13 DIAGNOSIS — J45.20 MILD INTERMITTENT ASTHMA WITHOUT COMPLICATION: Primary | ICD-10-CM

## 2020-04-13 NOTE — LETTER
[place] CARE COORDINATION    April 14, 2020    Laura [facility]  [address] [facility] [address]      Dear Laura,    I am a clinic care coordinator who works with the primary care providers at [facility]. I am reaching ot to you to introduce Clinic Care Coordination and to see if there was anything I could assist you with.  Below is a description of clinic care coordination and how I can further assist you.      The clinic care coordinator team is made up of a registered nurse,  and community health worker who understand the health care system. The goal of clinic care coordination is to help you manage your health and improve access to the health care system in the most efficient manner. The team can assist you in meeting your health care goals by providing education, coordinating services, strengthening the communication among your providers and supporting you with any resource needs.     Please feel free to contact Li, our community health worker at [phone] if you would like to schedule an initial assessment with the nurse or  care coordinator. We recognize the ever-changing and stressful situation we are all facing with the current COVID-19 outbreak. We are focused on providing you with the highest-quality healthcare experience possible and ensuring you have the resources and information needed to remain safe and as healthy as possible.     Sincerely,     ARUNA Diaz Care Coordinator  Part of the Care Coordination team at [facility]

## 2020-04-13 NOTE — PROGRESS NOTES
Clinic Care Coordination Contact  [facility]/Voicemail    Referral Source: Health Plan  Reason for referral: Payor request for Proactive Outreach due to COVID-19 risk    Clinical Data: Care Coordinator Outreach    Outreach attempted x 1.  Unable to leave message voicemail was not set up.    Plan: Care Coordinator will try to reach patient again in 1-2 business days.    EMERSON MojicaN, RN   [facility]  - Clinic Care Coordinator  Phone: [phone]

## 2020-04-14 NOTE — PROGRESS NOTES
Clinic Care Coordination Contact  [facility]/Voicemail    Referral Source: Health Plan    Clinical Data: Care Coordinator Outreach    Outreach attempted x 2. Unable to leave message as voicemail not set up.    Plan: Care Coordinator will send care coordination introduction letter with care coordinator contact information and explanation of care coordination services via [employer]. Care Coordinator will do no further outreaches at this time.    EMERSON MojicaN, RN   [facility]  - Clinic Care Coordinator  Phone: [phone]

## 2020-05-07 ENCOUNTER — VIRTUAL VISIT (OUTPATIENT)
Dept: OBGYN | Facility: CLINIC | Age: 30
End: 2020-05-07
Payer: COMMERCIAL

## 2020-05-07 VITALS — HEIGHT: 63 IN | BODY MASS INDEX: 28.12 KG/M2

## 2020-05-07 DIAGNOSIS — R10.2 PELVIC PAIN IN FEMALE: Primary | ICD-10-CM

## 2020-05-07 PROBLEM — F51.01 INSOMNIA, IDIOPATHIC: Status: ACTIVE | Noted: 2018-07-20

## 2020-05-07 PROBLEM — Z30.430 ENCOUNTER FOR INSERTION OF MIRENA IUD: Status: RESOLVED | Noted: 2017-03-23 | Resolved: 2020-05-07

## 2020-05-07 PROCEDURE — 99202 OFFICE O/P NEW SF 15 MIN: CPT | Mod: 95 | Performed by: OBSTETRICS & GYNECOLOGY

## 2020-05-07 RX ORDER — NORELGESTROMIN AND ETHINYL ESTRADIOL 35; 150 UG/MG; UG/MG
PATCH TRANSDERMAL
Qty: 9 PATCH | Refills: 0 | Status: ON HOLD | OUTPATIENT
Start: 2020-05-07 | End: 2020-07-22

## 2020-05-07 ASSESSMENT — ANXIETY QUESTIONNAIRES
6. BECOMING EASILY ANNOYED OR IRRITABLE: NEARLY EVERY DAY
7. FEELING AFRAID AS IF SOMETHING AWFUL MIGHT HAPPEN: NOT AT ALL
5. BEING SO RESTLESS THAT IT IS HARD TO SIT STILL: NEARLY EVERY DAY
1. FEELING NERVOUS, ANXIOUS, OR ON EDGE: NEARLY EVERY DAY
3. WORRYING TOO MUCH ABOUT DIFFERENT THINGS: NEARLY EVERY DAY
GAD7 TOTAL SCORE: 18
IF YOU CHECKED OFF ANY PROBLEMS ON THIS QUESTIONNAIRE, HOW DIFFICULT HAVE THESE PROBLEMS MADE IT FOR YOU TO DO YOUR WORK, TAKE CARE OF THINGS AT HOME, OR GET ALONG WITH OTHER PEOPLE: VERY DIFFICULT
2. NOT BEING ABLE TO STOP OR CONTROL WORRYING: NEARLY EVERY DAY

## 2020-05-07 ASSESSMENT — PATIENT HEALTH QUESTIONNAIRE - PHQ9
5. POOR APPETITE OR OVEREATING: NEARLY EVERY DAY
SUM OF ALL RESPONSES TO PHQ QUESTIONS 1-9: 13

## 2020-05-08 ASSESSMENT — ANXIETY QUESTIONNAIRES: GAD7 TOTAL SCORE: 18

## 2020-06-12 ENCOUNTER — TELEPHONE (OUTPATIENT)
Dept: OBGYN | Facility: CLINIC | Age: 30
End: 2020-06-12

## 2020-06-12 DIAGNOSIS — R10.2 PELVIC PAIN IN FEMALE: Primary | ICD-10-CM

## 2020-06-12 NOTE — TELEPHONE ENCOUNTER
S-(situation): Started bleeding today, just finished period at the end of May.  Having a lot of cramping, rates her pain a 7-8/10.  Pt is requesting to change to a different medication.    B-(background): Started new hormonal patch on 05-07-20.    A-(assessment): Pelvic cramping.    R-(recommendations): Pt was advised to be seen in the ER for pain management.  She declines this advice and says she will call back on Monday to leave a message for Dr. Chaidez.  I informed pt that Dr. Chaidez is out of the office until Tues June 16th but would forward this message to her.  Pt was encouraged to give this treatment more time for her body adapt to the hormones in the patch.  Pt would like to discuss with Dr. Chaidez on Tues.    Treva Lemon  [facility] RN

## 2020-06-16 RX ORDER — INDOMETHACIN 25 MG/1
25 CAPSULE ORAL
Qty: 15 CAPSULE | Refills: 0 | Status: SHIPPED | OUTPATIENT
Start: 2020-06-16 | End: 2020-06-23

## 2020-06-16 NOTE — TELEPHONE ENCOUNTER
Message left for patient to call back.  Not sure when I will be available again to discuss since I am on call, but you can get her information about when and what number she can be best contacted.     If she just wants to change medication, then I just need to know which option she wants: pill, ring, depo provera or does she want additional medication for her cramps while she gets used to the Evra?    May need to schedule another virtual visit if she has a lot of questions.     Mago Chaidez M.D.

## 2020-06-16 NOTE — TELEPHONE ENCOUNTER
"VO Indocin 25 mg TID 15# NR  Coleman SIGALA/Margie RN     Patient returning call to clinic.  Patient reports still having troubles with bleeding and pelvic cramping pain. See telephone visit as below. Patient reports \" crying spells\" because the pain is so bad at times.     Unable to complete virtual visit today with Dr. Chaidez due to time constraints. Scheduled for next week on Tuesday at 11 am.    Verbal orders for prescription for pelvic pain to help patient.   Reviewed use of birth control patch with patient. Patient advised regardless of bleeding, keep the patch on for 21 days and then take off for 7 days. Patient has been putting on and taking off as bleeding occurs/stops.    Pt in agreement and reports understanding.  Prescription to [facility] pharmacy as patient indicated. Virtual visit scheduled for next week on Tuesday.    Renuka Peters   [facility]  RN    "

## 2020-06-23 ENCOUNTER — VIRTUAL VISIT (OUTPATIENT)
Dept: OBGYN | Facility: CLINIC | Age: 30
End: 2020-06-23
Payer: COMMERCIAL

## 2020-06-23 DIAGNOSIS — R10.2 PELVIC PAIN IN FEMALE: Primary | ICD-10-CM

## 2020-06-23 PROCEDURE — 99443 ZZC PHYSICIAN TELEPHONE EVALUATION 21-30 MIN: CPT | Performed by: OBSTETRICS & GYNECOLOGY

## 2020-06-23 RX ORDER — OXYCODONE HYDROCHLORIDE 5 MG/1
5 TABLET ORAL EVERY 6 HOURS PRN
Qty: 5 TABLET | Refills: 0 | Status: ON HOLD | OUTPATIENT
Start: 2020-06-23 | End: 2020-07-22

## 2020-06-23 RX ORDER — KETOROLAC TROMETHAMINE 10 MG/1
10 TABLET, FILM COATED ORAL EVERY 6 HOURS PRN
Qty: 30 TABLET | Refills: 0 | Status: ON HOLD | OUTPATIENT
Start: 2020-06-23 | End: 2020-07-22

## 2020-06-23 NOTE — PROGRESS NOTES
"Laura Grewal is a 29 year old female who is being evaluated via a billable telephone visit.      The patient has been notified of following:     \"This telephone visit will be conducted via a call between you and your physician/provider. We have found that certain health care needs can be provided without the need for a physical exam.  This service lets us provide the care you need with a short phone conversation.  If a prescription is necessary we can send it directly to your pharmacy.  If lab work is needed we can place an order for that and you can then stop by our lab to have the test done at a later time.    Telephone visits are billed at different rates depending on your insurance coverage. During this emergency period, for some insurers they may be billed the same as an in-person visit.  Please reach out to your insurance provider with any questions.    If during the course of the call the physician/provider feels a telephone visit is not appropriate, you will not be charged for this service.\"    Patient has given verbal consent for Telephone visit?  Yes    What phone number would you like to be contacted at? [phone]      How would you like to obtain your AVS? Lolita Sanchez is a 29 year old  here for follow up of pelvic pain/cramps.  She was started on Ortho Evra last month, which she is tolerating well enough, but the cramps were still very painful when she got her period.  She has restarted on the patch in the meantime, but she feels like she needs heat on her abdomen all the time and ibuprofen and indocin are not helping very much.  She states that cramps are still present even when she is not having her period and that she feels like she is in labor and needs to push.  She is fairly certain that she is done with childbearing.      ROS: Ten point review of systems was reviewed and negative except the above.    PMH: Her past medical, surgical, and obstetric histories were reviewed and " are documented in their appropriate chart areas.    ALL/Meds: Her medication and allergy histories were reviewed and are documented in their appropriate chart areas.    Social History     Tobacco Use     Smoking status: Never Smoker     Smokeless tobacco: Never Used   Substance Use Topics     Alcohol use: Yes     Alcohol/week: 0.0 standard drinks     Comment: very little     Drug use: No      FH: Her family history was reviewed and documented in its appropriate chart area.     A/P 29 year old  here for     ICD-10-CM    1. Pelvic pain in female  R10.2 ketorolac (TORADOL) 10 MG tablet     US Pelvic Complete w Transvaginal     oxyCODONE (ROXICODONE) 5 MG tablet        1. We discussed short and long term strategies.  In the short term, will trial a different NSAID to try and provide relief and a very short course of acute opioid management.  Patient understands this is not a long term strategy.  In the meantime, I advised her to remain on the Evra and hopefully we will see some benefit over time.    In the long term, we discussed a pelvic ultrasound to reassess (last performed ) as well as an exam in the office to determine other potential causes for pain such as pelvic floor dysfunction.  We discussed pros/cons of hysterectomy, which would be helpful if her pain is primarily related to the uterus itself.  Discussed that it is possible to have recurrent or persistent pain if it is related to pelvic floor or GI issues.  Discussed that she would have permanent inability to have more children.  Reviewed that at her age, we generally recommend keeping the ovaries, although there is a up to 30% chance of re-operative in the future for the ovaries either due to pain or cysts or malignancy.      Patient voices understanding and medications were sent to her pharmacy.  Pelvic ultrasound ordered and patient will follow up in the office for examination.     Mago Chaidez M.D.      Phone call duration:   minutes

## 2020-06-26 ENCOUNTER — TELEPHONE (OUTPATIENT)
Dept: OBGYN | Facility: CLINIC | Age: 30
End: 2020-06-26

## 2020-06-26 ENCOUNTER — OFFICE VISIT (OUTPATIENT)
Dept: OBGYN | Facility: CLINIC | Age: 30
End: 2020-06-26
Payer: COMMERCIAL

## 2020-06-26 ENCOUNTER — PREP FOR PROCEDURE (OUTPATIENT)
Dept: OBGYN | Facility: CLINIC | Age: 30
End: 2020-06-26

## 2020-06-26 ENCOUNTER — HOSPITAL ENCOUNTER (OUTPATIENT)
Dept: ULTRASOUND IMAGING | Facility: CLINIC | Age: 30
Discharge: HOME | End: 2020-06-26
Attending: OBSTETRICS & GYNECOLOGY | Admitting: OBSTETRICS & GYNECOLOGY
Payer: COMMERCIAL

## 2020-06-26 VITALS
BODY MASS INDEX: 29.98 KG/M2 | WEIGHT: 169.2 LBS | HEIGHT: 63 IN | HEART RATE: 78 BPM | TEMPERATURE: 98.6 F | RESPIRATION RATE: 16 BRPM | DIASTOLIC BLOOD PRESSURE: 83 MMHG | SYSTOLIC BLOOD PRESSURE: 128 MMHG

## 2020-06-26 DIAGNOSIS — N94.6 DYSMENORRHEA: Primary | ICD-10-CM

## 2020-06-26 DIAGNOSIS — R10.2 PELVIC PAIN IN FEMALE: ICD-10-CM

## 2020-06-26 DIAGNOSIS — Z11.59 ENCOUNTER FOR SCREENING FOR OTHER VIRAL DISEASES: Primary | ICD-10-CM

## 2020-06-26 PROCEDURE — 99213 OFFICE O/P EST LOW 20 MIN: CPT | Performed by: OBSTETRICS & GYNECOLOGY

## 2020-06-26 PROCEDURE — 76830 TRANSVAGINAL US NON-OB: CPT

## 2020-06-26 RX ORDER — CEFAZOLIN SODIUM 1 G/50ML
1 INJECTION, SOLUTION INTRAVENOUS SEE ADMIN INSTRUCTIONS
Status: CANCELLED | OUTPATIENT
Start: 2020-06-26

## 2020-06-26 RX ORDER — CEFAZOLIN SODIUM 2 G/100ML
2 INJECTION, SOLUTION INTRAVENOUS
Status: CANCELLED | OUTPATIENT
Start: 2020-06-26

## 2020-06-26 RX ORDER — PHENAZOPYRIDINE HYDROCHLORIDE 100 MG/1
200 TABLET, FILM COATED ORAL ONCE
Status: CANCELLED | OUTPATIENT
Start: 2020-06-26 | End: 2020-06-26

## 2020-06-26 RX ORDER — ACETAMINOPHEN 325 MG/1
975 TABLET ORAL ONCE
Status: CANCELLED | OUTPATIENT
Start: 2020-06-26 | End: 2020-06-26

## 2020-06-26 ASSESSMENT — MIFFLIN-ST. JEOR: SCORE: 1470.58

## 2020-06-26 NOTE — TELEPHONE ENCOUNTER
"You are now scheduled for surgery at [facility] [place]. Below are the details for your surgery. Please read the \"Preparing for Your Surgery\" instructions and let us know if you have any questions.     Surgeon: Mago Chaidez MD    Surgical Procedure: total vaginal hysterectomy, possible bilateral salpigectomy, possible cystoscopy (N/A)     Date of Procedure: 07/22/2020  Time: 11:30am  Arrival Time: 10:30am (Time can change; to be confirmed a couple days prior by pre-op surgery nurse who will be calling)    Home Preparation:   Preop physical exam:  Needed within 30 days of surgery  Pre-op Scheduled With:  _____ PCP/FP     Date: ______  Time: _______    Shower with Hibiclens the night before and the morning of surgery, gently cleaning skin from neck to feet - see included instructions.     Take medications with a sip of water the morning of surgery (if approved by your doctor).    May have a light meal, toast and clear liquids, up to 8 hrs before surgery.     May have clear liquids (liquids one can see through) up to 4 hrs before surgery.    Nothing after 4 hrs before surgery.    Stop aspirin 7-10 days before surgery; Stop NSAIDS (Ibuprofen, Naproxen, etc) 5 days before surgery; Stop Plavix 7-10 days before surgery.     Sincerely,    [facility] [phone]; Fax #: [phone]  Same Day Surgery [phone]; Fax: [phone]    Pre-Op Appt Date: Patient to schedule within 30 days of surgery  Post-Op Appt Date: To be determined by provider.   Packet sent out: Yes  Pre-cert/Authorization completed: TBD by Financial Securing Office  [place] Sterilization/Hysterectomy Acknowledgment Consent signed: Unknown    .  "

## 2020-06-26 NOTE — PROGRESS NOTES
"Laura is a 29 year old  here for follow up of pelvic pain.  She underwent a pelvic U/S this morning that confirms a smaller uterus and no major adnexal pathology.  Small amount of endometrial fluid present.      She is very certain that she wants to proceed with hysterectomy at this point.  She has finally stopped bleeding while on Ortho Evra but her pelvis remains achy and she has avoided intercourse due to pain.  She is certain that she is done with childbearing.     ROS: Ten point review of systems was reviewed and negative except the above.    PMH: Her past medical, surgical, and obstetric histories were reviewed and are documented in their appropriate chart areas.    ALL/Meds: Her medication and allergy histories were reviewed and are documented in their appropriate chart areas.    Social History     Tobacco Use     Smoking status: Never Smoker     Smokeless tobacco: Never Used   Substance Use Topics     Alcohol use: Yes     Alcohol/week: 0.0 standard drinks     Comment: very little     Drug use: No        FH: Her family history was reviewed and documented in its appropriate chart area.     PE: /83 (BP Location: Right arm, Patient Position: Chair, Cuff Size: Adult Regular)   Pulse 78   Temp 98.6  F (37  C) (Tympanic)   Resp 16   Ht 1.607 m (5' 3.25\")   Wt 76.7 kg (169 lb 3.2 oz)   LMP 2020   Breastfeeding No   BMI 29.74 kg/m      General Appearance:  healthy, alert, active, no distress  HEENT: NCAT  Abdomen: Soft, nontender.  Normal bowel sounds.  No masses  Pelvic exam: normal vagina and vulva, normal cervix without lesions, uterus normal size anteverted, adenxa normal in size without tenderness.  Moderate tenderness of bilateral levator muscles.  Moderate discomfort with moving of the uterus.     A/P 29 year old  here for     ICD-10-CM    1. Dysmenorrhea  N94.6 Case Request: total vaginal hysterectomy, possible bilateral salpigectomy, possible cystoscopy     Case Request: " total vaginal hysterectomy, possible bilateral salpigectomy, possible cystoscopy   2. Pelvic pain in female  R10.2 Case Request: total vaginal hysterectomy, possible bilateral salpigectomy, possible cystoscopy     Case Request: total vaginal hysterectomy, possible bilateral salpigectomy, possible cystoscopy        1. Dysmenorrhea/pelvic pain: reviewed that patient is an excellent candidate for total vaginal hysterectomy and that an attempt would be made to remove the fallopian tubes, but that it is possible that they need to remain if too difficult to access.  Discussed that patient may still have pelvic floor dysfunction even without her uterus, and that we would need to address this separately if she has recurrent or persistent pelvic pain despite hysterectomy.  I do think that removing the uterus will provide some pain relief, especially with periods, but she is amenable to address the pelvic floor separately post-op if necessary.     Mago Chaidez M.D.      20 minutes was spent face to face with the patient today, discussing her history, diagnosis, and follow-up plan as noted above.  Over 50% of the visit was spent in counseling and coordination of care.

## 2020-06-26 NOTE — NURSING NOTE
"Initial /83 (BP Location: Right arm, Patient Position: Chair, Cuff Size: Adult Regular)   Pulse 78   Temp 98.6  F (37  C) (Tympanic)   Resp 16   Ht 1.607 m (5' 3.25\")   Wt 76.7 kg (169 lb 3.2 oz)   LMP 06/05/2020   Breastfeeding No   BMI 29.74 kg/m   Estimated body mass index is 29.74 kg/m  as calculated from the following:    Height as of this encounter: 1.607 m (5' 3.25\").    Weight as of this encounter: 76.7 kg (169 lb 3.2 oz). .    Caro Chávez, PETRA    "

## 2020-07-15 ENCOUNTER — TELEPHONE (OUTPATIENT)
Dept: OBGYN | Facility: CLINIC | Age: 30
End: 2020-07-15

## 2020-07-15 ENCOUNTER — OFFICE VISIT (OUTPATIENT)
Dept: FAMILY MEDICINE | Facility: CLINIC | Age: 30
End: 2020-07-15
Payer: COMMERCIAL

## 2020-07-15 VITALS
RESPIRATION RATE: 16 BRPM | HEART RATE: 87 BPM | OXYGEN SATURATION: 98 % | BODY MASS INDEX: 29.86 KG/M2 | DIASTOLIC BLOOD PRESSURE: 84 MMHG | HEIGHT: 63 IN | WEIGHT: 168.5 LBS | TEMPERATURE: 98.2 F | SYSTOLIC BLOOD PRESSURE: 118 MMHG

## 2020-07-15 DIAGNOSIS — F40.232 FEAR OF OTHER MEDICAL CARE: Primary | ICD-10-CM

## 2020-07-15 DIAGNOSIS — Z01.818 PREOP GENERAL PHYSICAL EXAM: Primary | ICD-10-CM

## 2020-07-15 DIAGNOSIS — N94.6 DYSMENORRHEA: ICD-10-CM

## 2020-07-15 DIAGNOSIS — J45.20 MILD INTERMITTENT ASTHMA WITHOUT COMPLICATION: ICD-10-CM

## 2020-07-15 LAB
ERYTHROCYTE [DISTWIDTH] IN BLOOD BY AUTOMATED COUNT: 13.1 % (ref 10–15)
HCG UR QL: NEGATIVE
HCT VFR BLD AUTO: 41.4 % (ref 35–47)
HGB BLD-MCNC: 13.7 G/DL (ref 11.7–15.7)
MCH RBC QN AUTO: 28.1 PG (ref 26.5–33)
MCHC RBC AUTO-ENTMCNC: 33.1 G/DL (ref 31.5–36.5)
MCV RBC AUTO: 85 FL (ref 78–100)
PLATELET # BLD AUTO: 371 10E9/L (ref 150–450)
RBC # BLD AUTO: 4.87 10E12/L (ref 3.8–5.2)
WBC # BLD AUTO: 7 10E9/L (ref 4–11)

## 2020-07-15 PROCEDURE — 99214 OFFICE O/P EST MOD 30 MIN: CPT | Performed by: NURSE PRACTITIONER

## 2020-07-15 PROCEDURE — 36415 COLL VENOUS BLD VENIPUNCTURE: CPT | Performed by: NURSE PRACTITIONER

## 2020-07-15 PROCEDURE — 81025 URINE PREGNANCY TEST: CPT | Performed by: NURSE PRACTITIONER

## 2020-07-15 PROCEDURE — 85027 COMPLETE CBC AUTOMATED: CPT | Performed by: NURSE PRACTITIONER

## 2020-07-15 ASSESSMENT — MIFFLIN-ST. JEOR: SCORE: 1467.4

## 2020-07-15 NOTE — TELEPHONE ENCOUNTER
"Spoke to pt and she was advised of this message and demands that she have covid testing done via blood specimen.  She reports that her mother had this done therefore knows that covid can be tested not only by nasal swab but also by blood test.  She reports that she has such anxiety and \"Dr. Chaidez knows this about me\".  \"I will post pone my surgery if I can't have this done\".    Will route to provider to review and advise.    Treva Lemon  [facility] RN  "

## 2020-07-15 NOTE — PATIENT INSTRUCTIONS
Before Your Surgery      Call your surgeon if there is any change in your health. This includes signs of a cold or flu (such as a sore throat, runny nose, cough, rash or fever).    Do not smoke, drink alcohol or take over the counter medicine (unless your surgeon or primary care doctor tells you to) for the 24 hours before and after surgery.    If you take prescribed drugs: Follow your doctor s orders about which medicines to take and which to stop until after surgery.    Eating and drinking prior to surgery: follow the instructions from your surgeon    Take a shower or bath the night before surgery. Use the soap your surgeon gave you to gently clean your skin. If you do not have soap from your surgeon, use your regular soap. Do not shave or scrub the surgery site.  Wear clean pajamas and have clean sheets on your bed.     Labs today: urine pregnancy and CBC

## 2020-07-15 NOTE — TELEPHONE ENCOUNTER
Return call to pt.  Unable to reach.  Left message for pt to return call to clinic.    Patient has a surgery scheduled for 7/22/2020.  Patient will need COVID testing ( nasally ) prior to procedure. Blood testing is not appropriate for diagnostic testing.    Renuka Peters   [facility]  RN

## 2020-07-15 NOTE — TELEPHONE ENCOUNTER
Reason for Call:  Other call back    Detailed comments: pt calling stating she is needing to get a COVID test would like to know if this can be done through blood test vs a nasal swab?     Phone Number Patient can be reached at: Home number on file [phone] (home)    Best Time: any     Can we leave a detailed message on this number? YES    Call taken on 7/15/2020 at 9:14 AM by Kelsy Trevino

## 2020-07-15 NOTE — PROGRESS NOTES
[facility]  [address] [facility] [address]  [phone]  Dept: [phone]    PRE-OP EVALUATION:  Today's date: 7/15/2020    Laura Grewal (: 1990) presents for pre-operative evaluation assessment as requested by Dr. Chaidez .  She requires evaluation and anesthesia risk assessment prior to undergoing surgery/procedure for treatment of  total vaginal hysterectomy, possible bilateral salpigectomy, possible cystoscopy-    Proposed Surgery/ Procedure: total vaginal hysterectomy, possible bilateral salpigectomy, possible cystoscopy-  Date of Surgery/ Procedure: 2020   Time of Surgery/ Procedure: 11:00 AM   Hospital/Surgical Facility: [facility]   [facility] Phys[facility]: [facility]  Type of Anesthesia Anticipated: General    Patient has a Health Care Directive or Living Will:  NO    1. NO - Do you have a history of heart attack, stroke, stent, bypass or surgery on an artery in the head, neck, heart or legs?  2. NO - Do you ever have any pain or discomfort in your chest?  3. NO - Do you have a history of  Heart Failure?  4. NO - Are you troubled by shortness of breath when: walking on the level, up a slight hill or at night?  5. NO - Do you currently have a cold, bronchitis or other respiratory infection?  6. NO - Do you have a cough, shortness of breath or wheezing?  7. NO - Do you sometimes get pains in the calves of your legs when you walk?  8. YES - Do you or anyone in your family have previous history of blood clots? Grandma   9. NO - Do you or does anyone in your family have a serious bleeding problem such as prolonged bleeding following surgeries or cuts?  10. NO - Have you ever had problems with anemia or been told to take iron pills?  11. NO - Have you had any abnormal blood loss such as black, tarry or bloody stools, or abnormal vaginal bleeding?  12. NO - Have you ever had a blood transfusion?  13. NO - Have you or any of your relatives ever had problems with  anesthesia?  14. NO - Do you have sleep apnea, excessive snoring or daytime drowsiness?  15. NO - Do you have any prosthetic heart valves?  16. NO - Do you have prosthetic joints?  17. NO - Is there any chance that you may be pregnant?      HPI:     HPI related to upcoming procedure: history of dysmenorrhea, possible endometriosis, failed conservative treatment scheduled for hysterectomy       See problem list for active medical problems.  Problems all longstanding and stable, except as noted/documented.  See ROS for pertinent symptoms related to these conditions.      MEDICAL HISTORY:     Patient Active Problem List    Diagnosis Date Noted     Dysmenorrhea 06/26/2020     Priority: Medium     Added automatically from request for surgery [id number]       Pelvic pain in female 06/26/2020     Priority: Medium     Added automatically from request for surgery [id number]       Insomnia, idiopathic 07/20/2018     Priority: Medium     Migraine without status migrainosus, not intractable, unspecified migraine type 06/09/2016     Priority: Medium     KERRY (generalized anxiety disorder) 06/09/2016     Priority: Medium     Did not tolerate Zoloft.  Currently trying lexapro       Intermittent asthma 05/27/2014     Priority: Medium     GERD (gastroesophageal reflux disease) 06/10/2013     Priority: Medium     Low back pain 07/03/2011     Priority: Medium     Psoriasis 10/16/2008     Priority: Medium     Has tried clindamycin, triamcinolone, and differin.  On nothing currently.       Congenital vascular nevus 05/24/2007     Priority: Medium     Right forehead       Temporomandibular joint disorder 03/20/2007     Priority: Medium     right side       CARDIOVASCULAR SCREENING; LDL GOAL LESS THAN 130 01/31/2012     Priority: Low      Past Medical History:   Diagnosis Date     Acute sinusitis, unspecified      Acute upper respiratory infections of unspecified site      Chickenpox      Depression with anxiety 5/19/2012     Genital warts  12/9/2015     Mild major depression (H) 5/19/2012     Sinus tachycardia 1/14/2015     Unspecified otitis media      Past Surgical History:   Procedure Laterality Date     MOUTH SURGERY      wisdom teeth     Current Outpatient Medications   Medication Sig Dispense Refill     FLUoxetine (PROZAC) 20 MG capsule Take 1 capsule (20 mg) by mouth daily 90 capsule 3     gabapentin (NEURONTIN) 300 MG capsule Take 1 capsule (300 mg) by mouth At Bedtime 90 capsule 3     albuterol (PROAIR HFA/PROVENTIL HFA/VENTOLIN HFA) 108 (90 Base) MCG/ACT inhaler Inhale 2 puffs into the lungs every 6 hours as needed for shortness of breath / dyspnea or wheezing (Patient not taking: Reported on 5/7/2020) 18 g 3     IBUPROFEN PO Take 200-400 mg by mouth every 6 hours as needed for moderate pain       ketorolac (TORADOL) 10 MG tablet Take 1 tablet (10 mg) by mouth every 6 hours as needed for moderate pain (Patient not taking: Reported on 7/15/2020) 30 tablet 0     norelgestromin-ethinyl estradiol (ORTHO EVRA) 150-35 MCG/24HR patch Remove old patch and apply new patch onto the skin once a week for 3 weeks (21 days). Do not wear patch week 4 (days 22-28), then repeat. (Patient not taking: Reported on 7/15/2020) 9 patch 0     rizatriptan (MAXALT) 10 MG tablet TAKE ONE TABLET BY MOUTH AT ONSET OF HEADACHE FOR MIGRAINE (Patient not taking: Reported on 5/7/2020) 10 tablet 5     OTC products: none    Allergies   Allergen Reactions     Tramadol Other (See Comments)     Mood swings     Zoloft [Serotonin Reuptake Inhibitors] Other (See Comments)     Migraine Headaches     Methylergonovine Anxiety      Latex Allergy: NO    Social History     Tobacco Use     Smoking status: Never Smoker     Smokeless tobacco: Never Used   Substance Use Topics     Alcohol use: Yes     Alcohol/week: 0.0 standard drinks     Comment: very little     History   Drug Use No       REVIEW OF SYSTEMS:   Constitutional, HEENT, cardiovascular, pulmonary, gi and gu systems are negative,  "except as otherwise noted.    EXAM:   /84 (BP Location: Right arm, Patient Position: Sitting, Cuff Size: Adult Regular)   Pulse 87   Temp 98.2  F (36.8  C) (Tympanic)   Resp 16   Ht 1.607 m (5' 3.25\")   Wt 76.4 kg (168 lb 8 oz)   LMP 07/11/2020   SpO2 98%   BMI 29.61 kg/m      GENERAL APPEARANCE: healthy, alert and no distress     EYES: EOMI, PERRL     HENT: ear canals and TM's normal and nose and mouth without ulcers or lesions     NECK: no adenopathy, no asymmetry, masses, or scars and thyroid normal to palpation     RESP: lungs clear to auscultation - no rales, rhonchi or wheezes     CV: regular rates and rhythm, normal S1 S2, no S3 or S4 and no murmur, click or rub     MS: extremities normal- no gross deformities noted, no evidence of inflammation in joints, FROM in all extremities.     SKIN: no suspicious lesions or rashes     NEURO: Normal strength and tone, sensory exam grossly normal, mentation intact and speech normal     PSYCH: mentation appears normal. and affect normal/bright     LYMPHATICS: No cervical adenopathy    DIAGNOSTICS:     Labs Resulted Today:   Results for orders placed or performed in visit on 07/15/20   CBC with platelets     Status: None   Result Value Ref Range    WBC 7.0 4.0 - 11.0 10e9/L    RBC Count 4.87 3.8 - 5.2 10e12/L    Hemoglobin 13.7 11.7 - 15.7 g/dL    Hematocrit 41.4 35.0 - 47.0 %    MCV 85 78 - 100 fl    MCH 28.1 26.5 - 33.0 pg    MCHC 33.1 31.5 - 36.5 g/dL    RDW 13.1 10.0 - 15.0 %    Platelet Count 371 150 - 450 10e9/L   HCG Qual, Urine ([id number])     Status: None   Result Value Ref Range    HCG Qual Urine Negative NEG^Negative       Recent Labs   Lab Test 02/03/20  2241 02/04/19  0743 02/04/19  0741   HGB 14.5 13.2  --     240  --      --  135   POTASSIUM 3.8  --  3.6   CR 0.72  --  0.73        IMPRESSION:   Reason for surgery/procedure: dysmenorrhea   Diagnosis/reason for consult: pre-op evaluation     The proposed surgical procedure is " considered INTERMEDIATE risk.    REVISED CARDIAC RISK INDEX  The patient has the following serious cardiovascular risks for perioperative complications such as (MI, PE, VFib and 3  AV Block):  No serious cardiac risks  INTERPRETATION: 0 risks: Class I (very low risk - 0.4% complication rate)    The patient has the following additional risks for perioperative complications:  No identified additional risks      ICD-10-CM    1. Preop general physical exam  Z01.818 CBC with platelets     HCG Qual, Urine ([id number])     CANCELED: COVID-19 Virus (Coronavirus) Antibody & Titer Reflex   2. Dysmenorrhea  N94.6    3. Mild intermittent asthma without complication  J45.20        RECOMMENDATIONS:       --Patient is to take all scheduled medications on the day of surgery    APPROVAL GIVEN to proceed with proposed procedure, without further diagnostic evaluation       Signed Electronically by: CIELO Li CNP    Copy of this evaluation report is provided to requesting physician.    [facility] Preop Guidelines    Revised Cardiac Risk Index

## 2020-07-15 NOTE — TELEPHONE ENCOUNTER
"Pt notified of below.  Pt reports understanding.  Pt reports \" I can try the Xanax but I can barely do the Influenza testing.\"    Patient advised a  is needed to bring her to her testing as she is not to drive while using the Xanax. Patient to take 30 minutes prior to appointment.    Pt in agreement and reports understanding.    Renuka Peters   [facility]  RN    "

## 2020-07-15 NOTE — TELEPHONE ENCOUNTER
These are our current system policy guidelines and there is no wiggle room without it due to the general anesthesia needed for her procedure.      I could send an rx for some xanax just for the purpose of obtaining the swab if that would help, but the COVID swab is non-negotiable for the system.      Mago Chaidez M.D.

## 2020-07-16 RX ORDER — ALPRAZOLAM 0.5 MG
0.5 TABLET ORAL ONCE
Qty: 1 TABLET | Refills: 0 | Status: ON HOLD | OUTPATIENT
Start: 2020-07-16 | End: 2020-07-22

## 2020-07-16 ASSESSMENT — ASTHMA QUESTIONNAIRES: ACT_TOTALSCORE: 21

## 2020-07-19 DIAGNOSIS — Z11.59 ENCOUNTER FOR SCREENING FOR OTHER VIRAL DISEASES: ICD-10-CM

## 2020-07-20 ENCOUNTER — ANESTHESIA EVENT (OUTPATIENT)
Dept: SURGERY | Facility: CLINIC | Age: 30
End: 2020-07-20
Payer: COMMERCIAL

## 2020-07-20 DIAGNOSIS — Z20.822 ENCOUNTER FOR LABORATORY TESTING FOR COVID-19 VIRUS: Primary | ICD-10-CM

## 2020-07-20 PROCEDURE — U0003 INFECTIOUS AGENT DETECTION BY NUCLEIC ACID (DNA OR RNA); SEVERE ACUTE RESPIRATORY SYNDROME CORONAVIRUS 2 (SARS-COV-2) (CORONAVIRUS DISEASE [COVID-19]), AMPLIFIED PROBE TECHNIQUE, MAKING USE OF HIGH THROUGHPUT TECHNOLOGIES AS DESCRIBED BY CMS-2020-01-R: HCPCS | Performed by: FAMILY MEDICINE

## 2020-07-21 LAB
SARS-COV-2 RNA SPEC QL NAA+PROBE: NOT DETECTED
SPECIMEN SOURCE: NORMAL

## 2020-07-22 ENCOUNTER — ANESTHESIA (OUTPATIENT)
Dept: SURGERY | Facility: CLINIC | Age: 30
End: 2020-07-22
Payer: COMMERCIAL

## 2020-07-22 ENCOUNTER — HOSPITAL ENCOUNTER (OUTPATIENT)
Facility: CLINIC | Age: 30
Discharge: HOME | End: 2020-07-22
Attending: OBSTETRICS & GYNECOLOGY | Admitting: OBSTETRICS & GYNECOLOGY
Payer: COMMERCIAL

## 2020-07-22 VITALS
BODY MASS INDEX: 31.1 KG/M2 | DIASTOLIC BLOOD PRESSURE: 88 MMHG | HEART RATE: 96 BPM | OXYGEN SATURATION: 94 % | RESPIRATION RATE: 16 BRPM | HEIGHT: 62 IN | SYSTOLIC BLOOD PRESSURE: 141 MMHG | WEIGHT: 169 LBS | TEMPERATURE: 98.4 F

## 2020-07-22 DIAGNOSIS — R10.2 PELVIC PAIN IN FEMALE: ICD-10-CM

## 2020-07-22 DIAGNOSIS — N94.6 DYSMENORRHEA: ICD-10-CM

## 2020-07-22 LAB
ABO + RH BLD: NORMAL
ABO + RH BLD: NORMAL
B-HCG SERPL-ACNC: <1 IU/L (ref 0–5)
BLD GP AB SCN SERPL QL: NORMAL
BLOOD BANK CMNT PATIENT-IMP: NORMAL
CREAT SERPL-MCNC: 0.78 MG/DL (ref 0.52–1.04)
GFR SERPL CREATININE-BSD FRML MDRD: >90 ML/MIN/{1.73_M2}
HGB BLD-MCNC: 13.1 G/DL (ref 11.7–15.7)
SPECIMEN EXP DATE BLD: NORMAL

## 2020-07-22 PROCEDURE — 25000128 H RX IP 250 OP 636: Performed by: OBSTETRICS & GYNECOLOGY

## 2020-07-22 PROCEDURE — 25000125 ZZHC RX 250: Performed by: NURSE ANESTHETIST, CERTIFIED REGISTERED

## 2020-07-22 PROCEDURE — 71000015 ZZH RECOVERY PHASE 1 LEVEL 2 EA ADDTL HR: Performed by: OBSTETRICS & GYNECOLOGY

## 2020-07-22 PROCEDURE — 82565 ASSAY OF CREATININE: CPT | Performed by: OBSTETRICS & GYNECOLOGY

## 2020-07-22 PROCEDURE — 36000056 ZZH SURGERY LEVEL 3 1ST 30 MIN: Performed by: OBSTETRICS & GYNECOLOGY

## 2020-07-22 PROCEDURE — 25000128 H RX IP 250 OP 636: Performed by: NURSE ANESTHETIST, CERTIFIED REGISTERED

## 2020-07-22 PROCEDURE — 71000014 ZZH RECOVERY PHASE 1 LEVEL 2 FIRST HR: Performed by: OBSTETRICS & GYNECOLOGY

## 2020-07-22 PROCEDURE — 27110028 ZZH OR GENERAL SUPPLY NON-STERILE: Performed by: OBSTETRICS & GYNECOLOGY

## 2020-07-22 PROCEDURE — 25000125 ZZHC RX 250: Performed by: OBSTETRICS & GYNECOLOGY

## 2020-07-22 PROCEDURE — 36415 COLL VENOUS BLD VENIPUNCTURE: CPT | Performed by: OBSTETRICS & GYNECOLOGY

## 2020-07-22 PROCEDURE — 88307 TISSUE EXAM BY PATHOLOGIST: CPT | Performed by: OBSTETRICS & GYNECOLOGY

## 2020-07-22 PROCEDURE — 25800030 ZZH RX IP 258 OP 636: Performed by: NURSE ANESTHETIST, CERTIFIED REGISTERED

## 2020-07-22 PROCEDURE — 25000566 ZZH SEVOFLURANE, EA 15 MIN: Performed by: OBSTETRICS & GYNECOLOGY

## 2020-07-22 PROCEDURE — 88307 TISSUE EXAM BY PATHOLOGIST: CPT | Mod: 26 | Performed by: OBSTETRICS & GYNECOLOGY

## 2020-07-22 PROCEDURE — 25000132 ZZH RX MED GY IP 250 OP 250 PS 637: Performed by: OBSTETRICS & GYNECOLOGY

## 2020-07-22 PROCEDURE — 86900 BLOOD TYPING SEROLOGIC ABO: CPT | Performed by: OBSTETRICS & GYNECOLOGY

## 2020-07-22 PROCEDURE — 40000305 ZZH STATISTIC PRE PROC ASSESS I: Performed by: OBSTETRICS & GYNECOLOGY

## 2020-07-22 PROCEDURE — 58262 VAG HYST INCLUDING T/O: CPT | Mod: 80 | Performed by: OBSTETRICS & GYNECOLOGY

## 2020-07-22 PROCEDURE — 36000058 ZZH SURGERY LEVEL 3 EA 15 ADDTL MIN: Performed by: OBSTETRICS & GYNECOLOGY

## 2020-07-22 PROCEDURE — 86850 RBC ANTIBODY SCREEN: CPT | Performed by: OBSTETRICS & GYNECOLOGY

## 2020-07-22 PROCEDURE — 85018 HEMOGLOBIN: CPT | Performed by: OBSTETRICS & GYNECOLOGY

## 2020-07-22 PROCEDURE — 84702 CHORIONIC GONADOTROPIN TEST: CPT | Performed by: OBSTETRICS & GYNECOLOGY

## 2020-07-22 PROCEDURE — 25000132 ZZH RX MED GY IP 250 OP 250 PS 637: Performed by: NURSE ANESTHETIST, CERTIFIED REGISTERED

## 2020-07-22 PROCEDURE — 37000008 ZZH ANESTHESIA TECHNICAL FEE, 1ST 30 MIN: Performed by: OBSTETRICS & GYNECOLOGY

## 2020-07-22 PROCEDURE — 86901 BLOOD TYPING SEROLOGIC RH(D): CPT | Performed by: OBSTETRICS & GYNECOLOGY

## 2020-07-22 PROCEDURE — 71000027 ZZH RECOVERY PHASE 2 EACH 15 MINS: Performed by: OBSTETRICS & GYNECOLOGY

## 2020-07-22 PROCEDURE — 37000009 ZZH ANESTHESIA TECHNICAL FEE, EACH ADDTL 15 MIN: Performed by: OBSTETRICS & GYNECOLOGY

## 2020-07-22 PROCEDURE — 58262 VAG HYST INCLUDING T/O: CPT | Performed by: OBSTETRICS & GYNECOLOGY

## 2020-07-22 PROCEDURE — 27210794 ZZH OR GENERAL SUPPLY STERILE: Performed by: OBSTETRICS & GYNECOLOGY

## 2020-07-22 RX ORDER — AMOXICILLIN 250 MG
1-2 CAPSULE ORAL 2 TIMES DAILY
Qty: 60 TABLET | Refills: 0 | Status: SHIPPED | OUTPATIENT
Start: 2020-07-22 | End: 2020-09-04

## 2020-07-22 RX ORDER — CEFAZOLIN SODIUM 2 G/100ML
2 INJECTION, SOLUTION INTRAVENOUS
Status: COMPLETED | OUTPATIENT
Start: 2020-07-22 | End: 2020-07-22

## 2020-07-22 RX ORDER — FENTANYL CITRATE 50 UG/ML
25-50 INJECTION, SOLUTION INTRAMUSCULAR; INTRAVENOUS
Status: DISCONTINUED | OUTPATIENT
Start: 2020-07-22 | End: 2020-07-22 | Stop reason: HOSPADM

## 2020-07-22 RX ORDER — KETOROLAC TROMETHAMINE 30 MG/ML
INJECTION, SOLUTION INTRAMUSCULAR; INTRAVENOUS PRN
Status: DISCONTINUED | OUTPATIENT
Start: 2020-07-22 | End: 2020-07-22

## 2020-07-22 RX ORDER — HYDROXYZINE HYDROCHLORIDE 25 MG/1
25 TABLET, FILM COATED ORAL EVERY 6 HOURS PRN
Status: DISCONTINUED | OUTPATIENT
Start: 2020-07-22 | End: 2020-07-22 | Stop reason: HOSPADM

## 2020-07-22 RX ORDER — CEFAZOLIN SODIUM 1 G/50ML
1 INJECTION, SOLUTION INTRAVENOUS SEE ADMIN INSTRUCTIONS
Status: DISCONTINUED | OUTPATIENT
Start: 2020-07-22 | End: 2020-07-22 | Stop reason: HOSPADM

## 2020-07-22 RX ORDER — ACETAMINOPHEN 325 MG/1
650 TABLET ORAL EVERY 4 HOURS PRN
Qty: 100 TABLET | Refills: 0 | COMMUNITY
Start: 2020-07-22 | End: 2020-07-30

## 2020-07-22 RX ORDER — PHENAZOPYRIDINE HYDROCHLORIDE 200 MG/1
200 TABLET, FILM COATED ORAL ONCE
Status: COMPLETED | OUTPATIENT
Start: 2020-07-22 | End: 2020-07-22

## 2020-07-22 RX ORDER — ONDANSETRON 4 MG/1
4-8 TABLET, ORALLY DISINTEGRATING ORAL EVERY 8 HOURS PRN
Qty: 10 TABLET | Refills: 0 | Status: SHIPPED | OUTPATIENT
Start: 2020-07-22 | End: 2020-09-04

## 2020-07-22 RX ORDER — ALBUTEROL SULFATE 0.83 MG/ML
2.5 SOLUTION RESPIRATORY (INHALATION) EVERY 4 HOURS PRN
Status: DISCONTINUED | OUTPATIENT
Start: 2020-07-22 | End: 2020-07-22 | Stop reason: HOSPADM

## 2020-07-22 RX ORDER — LIDOCAINE 40 MG/G
CREAM TOPICAL
Status: DISCONTINUED | OUTPATIENT
Start: 2020-07-22 | End: 2020-07-22 | Stop reason: HOSPADM

## 2020-07-22 RX ORDER — HYDROMORPHONE HYDROCHLORIDE 1 MG/ML
.3-.5 INJECTION, SOLUTION INTRAMUSCULAR; INTRAVENOUS; SUBCUTANEOUS EVERY 10 MIN PRN
Status: DISCONTINUED | OUTPATIENT
Start: 2020-07-22 | End: 2020-07-22 | Stop reason: HOSPADM

## 2020-07-22 RX ORDER — ONDANSETRON 2 MG/ML
4 INJECTION INTRAMUSCULAR; INTRAVENOUS EVERY 30 MIN PRN
Status: DISCONTINUED | OUTPATIENT
Start: 2020-07-22 | End: 2020-07-22 | Stop reason: HOSPADM

## 2020-07-22 RX ORDER — OXYCODONE HYDROCHLORIDE 5 MG/1
5 TABLET ORAL
Status: DISCONTINUED | OUTPATIENT
Start: 2020-07-22 | End: 2020-07-22 | Stop reason: HOSPADM

## 2020-07-22 RX ORDER — SODIUM CHLORIDE, SODIUM LACTATE, POTASSIUM CHLORIDE, CALCIUM CHLORIDE 600; 310; 30; 20 MG/100ML; MG/100ML; MG/100ML; MG/100ML
INJECTION, SOLUTION INTRAVENOUS CONTINUOUS
Status: DISCONTINUED | OUTPATIENT
Start: 2020-07-22 | End: 2020-07-22 | Stop reason: HOSPADM

## 2020-07-22 RX ORDER — ACETAMINOPHEN 325 MG/1
975 TABLET ORAL ONCE
Status: COMPLETED | OUTPATIENT
Start: 2020-07-22 | End: 2020-07-22

## 2020-07-22 RX ORDER — OXYCODONE HYDROCHLORIDE 5 MG/1
5-10 TABLET ORAL
Qty: 20 TABLET | Refills: 0 | Status: SHIPPED | OUTPATIENT
Start: 2020-07-22 | End: 2020-07-24

## 2020-07-22 RX ORDER — ONDANSETRON 2 MG/ML
INJECTION INTRAMUSCULAR; INTRAVENOUS PRN
Status: DISCONTINUED | OUTPATIENT
Start: 2020-07-22 | End: 2020-07-22

## 2020-07-22 RX ORDER — KETOROLAC TROMETHAMINE 30 MG/ML
30 INJECTION, SOLUTION INTRAMUSCULAR; INTRAVENOUS
Status: DISCONTINUED | OUTPATIENT
Start: 2020-07-22 | End: 2020-07-22 | Stop reason: HOSPADM

## 2020-07-22 RX ORDER — ONDANSETRON 4 MG/1
4 TABLET, ORALLY DISINTEGRATING ORAL EVERY 30 MIN PRN
Status: DISCONTINUED | OUTPATIENT
Start: 2020-07-22 | End: 2020-07-22 | Stop reason: HOSPADM

## 2020-07-22 RX ORDER — IBUPROFEN 600 MG/1
600 TABLET, FILM COATED ORAL EVERY 6 HOURS PRN
Qty: 30 TABLET | Refills: 0 | Status: SHIPPED | OUTPATIENT
Start: 2020-07-22 | End: 2020-09-04

## 2020-07-22 RX ORDER — HYDROXYZINE HYDROCHLORIDE 25 MG/1
25 TABLET, FILM COATED ORAL
Status: DISCONTINUED | OUTPATIENT
Start: 2020-07-22 | End: 2020-07-22 | Stop reason: HOSPADM

## 2020-07-22 RX ORDER — LIDOCAINE HYDROCHLORIDE 10 MG/ML
INJECTION, SOLUTION INFILTRATION; PERINEURAL PRN
Status: DISCONTINUED | OUTPATIENT
Start: 2020-07-22 | End: 2020-07-22

## 2020-07-22 RX ORDER — FENTANYL CITRATE 50 UG/ML
INJECTION, SOLUTION INTRAMUSCULAR; INTRAVENOUS PRN
Status: DISCONTINUED | OUTPATIENT
Start: 2020-07-22 | End: 2020-07-22

## 2020-07-22 RX ORDER — MEPERIDINE HYDROCHLORIDE 25 MG/ML
12.5 INJECTION INTRAMUSCULAR; INTRAVENOUS; SUBCUTANEOUS
Status: DISCONTINUED | OUTPATIENT
Start: 2020-07-22 | End: 2020-07-22 | Stop reason: HOSPADM

## 2020-07-22 RX ORDER — ONDANSETRON 2 MG/ML
4 INJECTION INTRAMUSCULAR; INTRAVENOUS EVERY 6 HOURS PRN
Status: DISCONTINUED | OUTPATIENT
Start: 2020-07-22 | End: 2020-07-22 | Stop reason: HOSPADM

## 2020-07-22 RX ORDER — IBUPROFEN 200 MG
600 TABLET ORAL
Status: DISCONTINUED | OUTPATIENT
Start: 2020-07-22 | End: 2020-07-22

## 2020-07-22 RX ORDER — OXYCODONE HYDROCHLORIDE 5 MG/1
5-10 TABLET ORAL
Status: DISCONTINUED | OUTPATIENT
Start: 2020-07-22 | End: 2020-07-22 | Stop reason: HOSPADM

## 2020-07-22 RX ORDER — PROPOFOL 10 MG/ML
INJECTION, EMULSION INTRAVENOUS PRN
Status: DISCONTINUED | OUTPATIENT
Start: 2020-07-22 | End: 2020-07-22

## 2020-07-22 RX ORDER — DEXAMETHASONE SODIUM PHOSPHATE 4 MG/ML
INJECTION, SOLUTION INTRA-ARTICULAR; INTRALESIONAL; INTRAMUSCULAR; INTRAVENOUS; SOFT TISSUE PRN
Status: DISCONTINUED | OUTPATIENT
Start: 2020-07-22 | End: 2020-07-22

## 2020-07-22 RX ORDER — LIDOCAINE HYDROCHLORIDE 10 MG/ML
INJECTION, SOLUTION INFILTRATION; PERINEURAL PRN
Status: DISCONTINUED | OUTPATIENT
Start: 2020-07-22 | End: 2020-07-22 | Stop reason: HOSPADM

## 2020-07-22 RX ORDER — NALOXONE HYDROCHLORIDE 0.4 MG/ML
.1-.4 INJECTION, SOLUTION INTRAMUSCULAR; INTRAVENOUS; SUBCUTANEOUS
Status: DISCONTINUED | OUTPATIENT
Start: 2020-07-22 | End: 2020-07-22 | Stop reason: HOSPADM

## 2020-07-22 RX ORDER — ONDANSETRON 4 MG/1
4 TABLET, ORALLY DISINTEGRATING ORAL
Status: DISCONTINUED | OUTPATIENT
Start: 2020-07-22 | End: 2020-07-22 | Stop reason: HOSPADM

## 2020-07-22 RX ORDER — GLYCOPYRROLATE 0.2 MG/ML
INJECTION, SOLUTION INTRAMUSCULAR; INTRAVENOUS PRN
Status: DISCONTINUED | OUTPATIENT
Start: 2020-07-22 | End: 2020-07-22

## 2020-07-22 RX ORDER — ONDANSETRON 4 MG/1
4 TABLET, ORALLY DISINTEGRATING ORAL EVERY 6 HOURS PRN
Status: DISCONTINUED | OUTPATIENT
Start: 2020-07-22 | End: 2020-07-22 | Stop reason: HOSPADM

## 2020-07-22 RX ORDER — IBUPROFEN 200 MG
600 TABLET ORAL EVERY 6 HOURS PRN
Status: DISCONTINUED | OUTPATIENT
Start: 2020-07-22 | End: 2020-07-22 | Stop reason: HOSPADM

## 2020-07-22 RX ORDER — HYDROXYZINE HYDROCHLORIDE 50 MG/1
50 TABLET, FILM COATED ORAL EVERY 6 HOURS PRN
Status: DISCONTINUED | OUTPATIENT
Start: 2020-07-22 | End: 2020-07-22 | Stop reason: HOSPADM

## 2020-07-22 RX ADMIN — HYDROMORPHONE HYDROCHLORIDE 0.5 MG: 1 INJECTION, SOLUTION INTRAMUSCULAR; INTRAVENOUS; SUBCUTANEOUS at 15:48

## 2020-07-22 RX ADMIN — HYDROMORPHONE HYDROCHLORIDE 0.5 MG: 1 INJECTION, SOLUTION INTRAMUSCULAR; INTRAVENOUS; SUBCUTANEOUS at 17:45

## 2020-07-22 RX ADMIN — ACETAMINOPHEN 975 MG: 325 TABLET, FILM COATED ORAL at 10:19

## 2020-07-22 RX ADMIN — PHENAZOPYRIDINE HYDROCHLORIDE 200 MG: 200 TABLET ORAL at 10:19

## 2020-07-22 RX ADMIN — SODIUM CHLORIDE, POTASSIUM CHLORIDE, SODIUM LACTATE AND CALCIUM CHLORIDE: 600; 310; 30; 20 INJECTION, SOLUTION INTRAVENOUS at 13:21

## 2020-07-22 RX ADMIN — PROPOFOL 200 MG: 10 INJECTION, EMULSION INTRAVENOUS at 12:19

## 2020-07-22 RX ADMIN — ROCURONIUM BROMIDE 50 MG: 10 INJECTION INTRAVENOUS at 12:19

## 2020-07-22 RX ADMIN — GLYCOPYRROLATE 0.3 MG: 0.2 INJECTION, SOLUTION INTRAMUSCULAR; INTRAVENOUS at 12:25

## 2020-07-22 RX ADMIN — LIDOCAINE HYDROCHLORIDE 0.1 ML: 10 INJECTION, SOLUTION EPIDURAL; INFILTRATION; INTRACAUDAL; PERINEURAL at 10:19

## 2020-07-22 RX ADMIN — SUGAMMADEX 200 MG: 100 INJECTION, SOLUTION INTRAVENOUS at 13:15

## 2020-07-22 RX ADMIN — HYDROMORPHONE HYDROCHLORIDE 1 MG: 1 INJECTION, SOLUTION INTRAMUSCULAR; INTRAVENOUS; SUBCUTANEOUS at 12:57

## 2020-07-22 RX ADMIN — HYDROMORPHONE HYDROCHLORIDE 0.5 MG: 1 INJECTION, SOLUTION INTRAMUSCULAR; INTRAVENOUS; SUBCUTANEOUS at 14:20

## 2020-07-22 RX ADMIN — MIDAZOLAM 1 MG: 1 INJECTION INTRAMUSCULAR; INTRAVENOUS at 17:26

## 2020-07-22 RX ADMIN — HYDROMORPHONE HYDROCHLORIDE 0.5 MG: 1 INJECTION, SOLUTION INTRAMUSCULAR; INTRAVENOUS; SUBCUTANEOUS at 14:37

## 2020-07-22 RX ADMIN — OXYCODONE HYDROCHLORIDE 5 MG: 5 TABLET ORAL at 14:50

## 2020-07-22 RX ADMIN — FENTANYL CITRATE 50 MCG: 50 INJECTION INTRAMUSCULAR; INTRAVENOUS at 13:59

## 2020-07-22 RX ADMIN — FENTANYL CITRATE 150 MCG: 50 INJECTION, SOLUTION INTRAMUSCULAR; INTRAVENOUS at 12:19

## 2020-07-22 RX ADMIN — HYDROXYZINE HYDROCHLORIDE 25 MG: 25 TABLET ORAL at 13:58

## 2020-07-22 RX ADMIN — CEFAZOLIN SODIUM 2 G: 2 INJECTION, SOLUTION INTRAVENOUS at 12:16

## 2020-07-22 RX ADMIN — ROCURONIUM BROMIDE 20 MG: 10 INJECTION INTRAVENOUS at 13:04

## 2020-07-22 RX ADMIN — MIDAZOLAM 2 MG: 1 INJECTION INTRAMUSCULAR; INTRAVENOUS at 12:16

## 2020-07-22 RX ADMIN — SODIUM CHLORIDE, POTASSIUM CHLORIDE, SODIUM LACTATE AND CALCIUM CHLORIDE: 600; 310; 30; 20 INJECTION, SOLUTION INTRAVENOUS at 10:19

## 2020-07-22 RX ADMIN — OXYCODONE HYDROCHLORIDE 5 MG: 5 TABLET ORAL at 15:48

## 2020-07-22 RX ADMIN — ONDANSETRON 4 MG: 2 INJECTION INTRAMUSCULAR; INTRAVENOUS at 13:17

## 2020-07-22 RX ADMIN — LIDOCAINE HYDROCHLORIDE 50 MG: 10 INJECTION, SOLUTION INFILTRATION; PERINEURAL at 12:19

## 2020-07-22 RX ADMIN — KETOROLAC TROMETHAMINE 30 MG: 30 INJECTION, SOLUTION INTRAMUSCULAR at 13:22

## 2020-07-22 RX ADMIN — DEXAMETHASONE SODIUM PHOSPHATE 8 MG: 4 INJECTION, SOLUTION INTRA-ARTICULAR; INTRALESIONAL; INTRAMUSCULAR; INTRAVENOUS; SOFT TISSUE at 12:59

## 2020-07-22 RX ADMIN — FENTANYL CITRATE 100 MCG: 50 INJECTION, SOLUTION INTRAMUSCULAR; INTRAVENOUS at 12:16

## 2020-07-22 ASSESSMENT — MIFFLIN-ST. JEOR: SCORE: 1449.83

## 2020-07-22 NOTE — OR NURSING
Late Note for 1735:Watching pt for Amee VALDOVINOS as She was taking a break and noted alarm sounding in room. On assessment Pt is having a hr 135-150, and pt is reporting that she is having her pain return. Pt placed on cardiac monitor: denied cp or discomfort, any soa, any concerns except that her pain was returning. On assessment pt with her temp at 99.4 , and pt slightly flushed. Pt had jaylene paw warmer shut off and IV fluids increased, no bleeding and lungs clear. Call to Justo CRNA and reported s/s. Orders received for Versed to treat anxiety. Med given and then pt visibly relaxing and hr slowing over 5 minutes.   Pt remains on monitor and report back to Amee VALDOVINOS

## 2020-07-22 NOTE — ANESTHESIA CARE TRANSFER NOTE
Patient: Laura Grewal    Procedure(s):  total vaginal hysterectomy,  bilateral salpigectomy    Diagnosis: Dysmenorrhea [N94.6]  Pelvic pain in female [R10.2]  Diagnosis Additional Information: No value filed.    Anesthesia Type:   General     Note:  Airway :Face Mask  Patient transferred to:PACU  Handoff Report: Identifed the Patient, Identified the Reponsible Provider, Reviewed the pertinent medical history, Discussed the surgical course, Reviewed Intra-OP anesthesia mangement and issues during anesthesia, Set expectations for post-procedure period and Allowed opportunity for questions and acknowledgement of understanding      Vitals: (Last set prior to Anesthesia Care Transfer)    CRNA VITALS  7/22/2020 1306 - 7/22/2020 1336      7/22/2020             Pulse:  90    SpO2:  100 %    Resp Rate (observed):  (!) 6                Electronically Signed By: CIELO Quintana CRNA  July 22, 2020  1:36 PM

## 2020-07-22 NOTE — BRIEF OP NOTE
[facility] Brief Operative Note    Pre-operative diagnosis: Dysmenorrhea [N94.6]  Pelvic pain in female [R10.2]   Post-operative diagnosis dysmenorrhea, pelvic pain     Procedure: Procedure(s):  total vaginal hysterectomy,  bilateral salpigectomy   Surgeon(s): Surgeon(s) and Role:     * Mago Chaidez MD - Primary     * Baylee Granda MD - Assisting     * Trevor Villa PA-C - Assisting   Estimated blood loss: 50 mL    Specimens: ID Type Source Tests Collected by Time Destination   A : Uterus and Bilateral Fallopian Tubes Organ Uterus,  Bilateral Fallopian Tubes SURGICAL PATHOLOGY EXAM Mago Chaidez MD 7/22/2020  1:04 PM       Findings: Boggy 6 week sized uterus with normal tubes and ovaries.

## 2020-07-22 NOTE — ANESTHESIA PREPROCEDURE EVALUATION
Anesthesia Pre-Procedure Evaluation    Patient: Laura Huff   MRN: [id number] : 1990          Preoperative Diagnosis: Dysmenorrhea [N94.6]  Pelvic pain in female [R10.2]    Procedure(s):  total vaginal hysterectomy, possible bilateral salpigectomy, possible cystoscopy--(-needs assist)    Past Medical History:   Diagnosis Date     Acute sinusitis, unspecified      Acute upper respiratory infections of unspecified site      Chickenpox      Depression with anxiety 2012     Genital warts 2015     Mild major depression (H) 2012     Sinus tachycardia 2015     Uncomplicated asthma      Unspecified otitis media      Past Surgical History:   Procedure Laterality Date     MOUTH SURGERY      wisdom teeth       Anesthesia Evaluation     . Pt has had prior anesthetic. Type: MAC           ROS/MED HX    ENT/Pulmonary:     (+)Mild Persistent asthma Treatment: Inhaler prn,  , . .    Neurologic:     (+)migraines,     Cardiovascular:  - neg cardiovascular ROS       METS/Exercise Tolerance:  >4 METS   Hematologic:  - neg hematologic  ROS       Musculoskeletal:   (+)  other musculoskeletal- TMJ      GI/Hepatic:     (+) GERD       Renal/Genitourinary:  - ROS Renal section negative       Endo:  - neg endo ROS       Psychiatric:     (+) psychiatric history depression and anxiety      Infectious Disease:  - neg infectious disease ROS       Malignancy:      - no malignancy   Other:    (+) H/O Chronic Pain,                        Physical Exam  Normal systems: cardiovascular, pulmonary and dental    Airway   Mallampati: II  TM distance: >3 FB  Neck ROM: full    Dental     Cardiovascular       Pulmonary             Lab Results   Component Value Date    WBC 7.0 07/15/2020    HGB 13.1 2020    HCT 41.4 07/15/2020     07/15/2020    CRP <2.9 2016     2020    POTASSIUM 3.8 2020    CHLORIDE 107 2020    CO2 24 2020    BUN 14 2020    CR 0.72 2020      "(H) 02/03/2020    MERCED 9.1 02/03/2020    MAG 1.7 01/09/2015    ALBUMIN 4.0 02/03/2020    PROTTOTAL 8.4 02/03/2020    ALT 75 (H) 02/03/2020    AST 34 02/03/2020    ALKPHOS 144 02/03/2020    BILITOTAL 0.6 02/03/2020    LIPASE 63 (L) 02/04/2019    AMYLASE 44 01/14/2015    TSH 1.16 04/07/2016    HCG Negative 07/15/2020    HCGS Negative 01/09/2015       Preop Vitals  BP Readings from Last 3 Encounters:   07/22/20 (!) 137/102   07/15/20 118/84   06/26/20 128/83    Pulse Readings from Last 3 Encounters:   07/15/20 87   06/26/20 78   02/04/20 122      Resp Readings from Last 3 Encounters:   07/22/20 18   07/15/20 16   06/26/20 16    SpO2 Readings from Last 3 Encounters:   07/22/20 100%   07/15/20 98%   02/04/20 100%      Temp Readings from Last 1 Encounters:   07/22/20 37.1  C (98.7  F) (Oral)    Ht Readings from Last 1 Encounters:   07/22/20 1.575 m (5' 2\")      Wt Readings from Last 1 Encounters:   07/22/20 76.7 kg (169 lb)    Estimated body mass index is 30.91 kg/m  as calculated from the following:    Height as of this encounter: 1.575 m (5' 2\").    Weight as of this encounter: 76.7 kg (169 lb).       Anesthesia Plan      History & Physical Review  History and physical reviewed and following examination; no interval change.    ASA Status:  2 .    NPO Status:  > 6 hours    Plan for General with Intravenous and Propofol induction. Maintenance will be Balanced.    PONV prophylaxis:  Ondansetron (or other 5HT-3) and Dexamethasone or Solumedrol  Additional equipment: Videolaryngoscope        Postoperative Care  Postoperative pain management:  IV analgesics and Oral pain medications.      Consents  Anesthetic plan, risks, benefits and alternatives discussed with:  Patient..                 Kayla Hairston CRNA, APRN [facility]  "

## 2020-07-22 NOTE — OP NOTE
Op Note     Pre-op diagnosis: 1. Dysmenorrhea, 2. Pelvic pain     Post-op diagnosis: 1. Dysmenorrhea, 2. Pelvic pain     Procedure: Total vaginal hysterectomy, bilateral salpingectomy, cystoscopy      Surgeon: Mago Chaidez M.D.      Assist: Baylee Granda MD, Trevor BACH       Anesthesia:  General Endotracheal     EBL 50     I/O: see anesthesia records     Complications: none     Findings: 6 week sized boggy uterus with normal tubes and ovaries.     Indications: Laura is a 29 year old  with ongoing pelvic pain and dysmenorrhea.  She underwent a pelvic U/S that confirms a smaller uterus and no major adnexal pathology.  Small amount of endometrial fluid present.       She is very certain that she wants to proceed with hysterectomy at this point.  She has finally stopped bleeding while on Ortho Evra but her pelvis remains achy and she has avoided intercourse due to pain.  She is certain that she is done with childbearing..     Reviewed risks, benefits and alternatives of total vaginal hysterectomy including but not limited to bleeding, infection, damage to other organs, and possible need to convert to abdominal approach.  All questions answered.      Procedure: Patient was brought to the operating room and general anesthesia was administered without difficulty.  She was prepped and draped in the usual sterile fashion in the dorsal lithotomy position.  A kumar catheter was placed in the bladder.  A weighted speculum was placed in the vagina.  The anterior and posterior aspects of the cervix were grasped with single toothed tenaculums.  The cervico-vaginal junction was infiltrated with a dilute pitressin solution circumferentially and then incised sharply with the scalpel.  Entry into the posterior and anterior cul-de-sacs was obtained sharply without difficulty.  The posterior cuff was tagged to the peritoneum with 0 vicryl suture.  The uterosacral ligaments were identified bilaterally, clamped  with Uzielantines, cut and suture-ligated with 0 vicryl.  The uterine arteries and cardinal ligament complex were identified bilaterally, clamped, desiccated and transected with the LigaSure device.  The round ligaments and the cornua were identified bilaterally, clamped, dessicated and transected with the LigaSure device.  The specimen was removed and sent to pathology.  The fallopian tube mesosalpinges were identified bilaterally, clamped, dessicated and transected with the LigaSure device.  The ovaries remain in place.  All areas of surgery were adequately hemostatic.  The cuff was grasped anterior and posterior with Allis clamps.  The cuff was re-approximated with 0-Vicryl suture in a running locked fashion with imbricating and tagging the uterosacral ligaments to the midline vagina.  This was performed in a vertical fashion.  Excellent hemostasis was noted.  All instruments were removed from the vagina.       Patient tolerated the procedure well.  Sponge, lap and needle counts were correct.  I was present for the entire procedure.  Patient was taken to recovery room extubated in stable condition.  Adela-operative antibiotics were given.  Surgical assistants were required for this surgery for their experience with retraction, achievement of hemostasis, and wound closure.     Mago Chaidez M.D.

## 2020-07-22 NOTE — DISCHARGE INSTRUCTIONS
Same Day Surgery Discharge Instructions  Special Precautions After Surgery - Adult    1. It is not unusual to feel lightheaded or faint, up to 24 hours after surgery or while taking pain medication.  If you have these symptoms; sit for a few minutes before standing and have someone assist you when getting up.  2. You should rest and relax for the next 24 hours and must have someone stay with you for at least 24 hours after your discharge.  3. DO NOT DRIVE any vehicle or operate mechanical equipment for 24 hours following the end of your surgery.  DO NOT DRIVE while taking narcotic pain medications that have been prescribed by your physician.  If you had a limb operated on, you must be able to use it fully to drive.  4. DO NOT drink alcoholic beverages for 24 hours following surgery or while taking prescription pain medication.  5. Drink clear liquids (apple juice, ginger ale, broth, 7-Up, etc.).  Progress to your regular diet as you feel able.  6. Any questions call your physician and do not make important decisions for 24 hours.    ACTIVITY  ? Rest today.  No activity or diet restrictions.  ? See printed discharge sheet.     INCISIONAL CARE  ? Be alert for signs of infection:  redness, swelling, heat, drainage of pus, and/or elevated temperature.  Contact your doctor if these occur.        Call for an appointment to return to the clinic in 6 weeks      Medications:  ? Acetaminophen (Tylenol):  Next dose: When you get home and every 6 hours  ? Ibuprofen (Motrin, Advil):  Next dose: 7:25 p.m. and every 6 hours  ? Oxycodone: Next dose: 7:45 pm  ? Ondansetron: 7:15 p.m. as needed for nausea  ? Senna-docusate: As precribed to prevent constipation  ? Follow the instructions on the bottle.     Additional discharge instructions:   Nausea and Vomiting  What are nausea and vomiting?   Nausea is the queasy feeling you usually have before you vomit. Vomiting is the forceful emptying (throwing up) of the  stomach's contents through the mouth.   What causes nausea and vomiting?   Nausea and vomiting are symptoms that may occur with many conditions, such as:   Anesthesia medications   side effect of narcotic medicines  exposure to unpleasant odors or sights   stress and anxiety     How is it treated?   At first you should rest your stomach for a few hours by eating nothing solid and sipping only clear liquids. A little later you can eat soft bland foods that are easy to digest.   It is important to drink small amounts (1 to 4 ounces) often so that you do not become dehydrated. Gradually drink larger amounts of the clear fluids. If you vomit, wait an hour, then start over with a smaller amount of fluid.   Eat slowly and avoid foods that are acidic, spicy, fatty, or fibrous (such as meats, coarse grains, and raw vegetables). Also avoid extremely hot or cold food. In addition, avoid dairy products if you have diarrhea. You may start eating your normal diet again in 3 days or so, when all signs of illness have passed.   Rest as much as possible. Sit or lie down with your head propped up. Do not lie flat for at least 2 hours after eating. Nausea and vomiting usually last only a short period of time. If you have cramping or pain in your belly you can try putting a heating pad set at low or a covered hot water bottle on your belly. Never set a heating pad on high because you could get burned.   If you have been vomiting for more than a day or have had diarrhea for over 3 days, you may need to have an exam by your provider, including a check for dehydration. If you are very dehydrated, you may need to be given fluids intravenously (IV). In children and older adults dehydration can quickly become life threatening.   When should I call my healthcare provider?   Talk with your provider if you are unable to keep fluids down for more than 12 hours or if you have any of the following symptoms with nausea and vomiting:   severe  headache or neck ache, or stiff neck   severe abdominal pain   diarrhea and vomiting that last more than 24 hours   blood in the vomited material that may look red, brown, or black, or like coffee grounds   bloody diarrhea   very forceful vomiting   signs of dehydration such as dry mouth, excessive thirst, little or no urination, severe weakness, dizziness, or lightheadedness.   If you have nausea and pain in the jaw, arm, shoulder, chest, or back; sweating; shortness of breath; or lightheadedness; call 911 for emergency care.       Post-operative Infection  What is a wound infection?    watch for signs of infection. Signs that the wound/incision is infected include:   Pus or cloudy fluid draining from the incision.   A pimple or yellow crust forming on the incision   The scab is increasing in size.   Increasing redness occurs around the incisions  A red streak is spreading from the wound toward the heart.   The incision has become extremely tender and/or hot   The lymph node draining that area of skin may become large and tender.   You may develop a fever over 100?F (37.8?C).     What is the cause?   Most skin infections follow breaks in the skin (for example, surgery,  from cuts, puncture wounds, animal bites, splinters, thorns, or burns). Bacteria (especially staphylococcus or streptococcus) then invade the wound and cause the infection.    Deeper wounds (like surgical incisions) are much more likely to become infected than superficial wounds (for example, scrapes).     What is the treatment?   Call your doctor's clinic if you feel you have the beginnings of an infection   Antibiotics You will probably need antibiotics prescribed by your healthcare provider. This medicine will kill the germs that are causing the wound infection. Try not to forget any of the doses.  Even if you feel better in a few days, take the antibiotic until it is completely gone to keep the infection from flaring up again.    Fever and pain  relief Take acetaminophen or ibuprofen if you develop a fever over 102?F (39?C)    How can I help prevent infections?   Wash around all new incisions vigorously with soap and water for 5 to 10 minutes to remove dirt and bacteria.      When should I call my healthcare provider?   Call IMMEDIATELY if:   The redness keeps spreading.   The wound becomes extremely painful.   Call during office hours if:   The fever is not gone 48 hours after you start taking an antibiotic.   The wound infection does not look better 3 days after your start taking an antibiotic.   The wound isn't completely healed within 10 days.   You have other questions or concerns.     Breakthrough Bleeding    How/why does it occur?   There are many causes of breakthrough bleeding onto your dressing. The two most common causes are increased activity and increased NSAID use.     How is it treated?   The treatment for breakthrough dressing bleeding depends on the cause. For simple problems such as a saturated dressing, you may need to reinforce the dressing with more gauze and tape and put slight pressure on the site.  Call your healthcare provider if something else is causing bleeding.          __________________________________________________________________________________________________________________________________  IMPORTANT NUMBERS:    [facility] [place] Main Number:  [phone], [phone]  Pharmacy:  [phone]  Same Day Surgery:  [phone], Monday - Friday until 8:30 p.m.  Emergency Room:  [phone]      [facility]:  [phone]                                                                             [facility] Clinic:  [phone]   [facility]:  [phone]

## 2020-07-22 NOTE — ANESTHESIA POSTPROCEDURE EVALUATION
Patient: Laura Grewal    Procedure(s):  total vaginal hysterectomy,  bilateral salpigectomy    Diagnosis:Dysmenorrhea [N94.6]  Pelvic pain in female [R10.2]  Diagnosis Additional Information: No value filed.    Anesthesia Type:  General    Note:  Anesthesia Post Evaluation    Patient location during evaluation: Bedside  Patient participation: Able to fully participate in evaluation  Level of consciousness: awake  Pain management: adequate  Airway patency: patent  Cardiovascular status: acceptable  Respiratory status: acceptable  Hydration status: acceptable  PONV: none     Anesthetic complications: None          Last vitals:  Vitals:    07/22/20 1606 07/22/20 1610 07/22/20 1615   BP:   118/85   Pulse:   74   Resp:  16 16   Temp:      SpO2: (!) 88% 98% 99%         Electronically Signed By: CIELO Quintana CRNA  July 22, 2020  4:34 PM

## 2020-07-23 ENCOUNTER — TELEPHONE (OUTPATIENT)
Dept: OBGYN | Facility: CLINIC | Age: 30
End: 2020-07-23

## 2020-07-23 DIAGNOSIS — G89.18 ACUTE POSTOPERATIVE PAIN: ICD-10-CM

## 2020-07-23 DIAGNOSIS — G89.18 ACUTE POSTOPERATIVE PAIN: Primary | ICD-10-CM

## 2020-07-23 RX ORDER — HYDROXYZINE PAMOATE 50 MG/1
50 CAPSULE ORAL EVERY 6 HOURS PRN
Qty: 20 CAPSULE | Refills: 0 | Status: SHIPPED | OUTPATIENT
Start: 2020-07-23 | End: 2020-09-04

## 2020-07-23 RX ORDER — HYDROCODONE BITARTRATE AND ACETAMINOPHEN 5; 325 MG/1; MG/1
1-2 TABLET ORAL EVERY 4 HOURS PRN
Qty: 10 TABLET | Refills: 0 | Status: SHIPPED | OUTPATIENT
Start: 2020-07-23 | End: 2020-07-24

## 2020-07-23 NOTE — OR NURSING
"Pt discharged. She did not appear to be in acute distress a this time. Pt reports no change in pain, but reports \"it;s tolerable.\"    Kim Alston RN on 7/22/2020 at 7:11 PM    "

## 2020-07-23 NOTE — TELEPHONE ENCOUNTER
Reason for Call:  Other prescription    Detailed comments: [facility] insurance will not approve more than 8 per 24 hours- can dispense 10 but it will be for a 2 day supply.    Per Dr. Chaidez - dispense only 8 for now - pt to call back tomorrow to let us know how they worked.    Phone Number Patient can be reached at: [phone]    Best Time:     Can we leave a detailed message on this number? YES    Call taken on 7/23/2020 at 4:15 PM by Violetta Arteaga

## 2020-07-23 NOTE — TELEPHONE ENCOUNTER
"Return call to patient.  Spoke with patient on the phone.    S-(situation): Patient reports she is not receiving adequate pain control with medications she was sent home from hospital with. Patient reports pain level was not less than 5 when discharged. Patient reports one time pain was a 2/10 but then \" spiked up.\"     Patient reports taking oxycodone 1 tablet per time, takes second tablet if first one is not working. Last had at 0500 2 tablets. Patient reports current pain is 6-7/10.    Patient reports pain at best has been 2/10 when in the hospital, otherwise steady at 5.    Patient reports taking tylenol last in the hospital and just had Ibuprofen now.     Patient discharged from hospital last night around 1900.    Patient denies fevers, sweats or chills. Patient denies nausea or vomiting. Patient reports normal urination. Patient has not had a bowel movement since surgery, + flatus. Patient reports \" cramping is really bad, cramps on steroids. Patient reports some bleeding with wiping with urination and only a couple spots on my pad.     Patient reports having 15 tablets left.   Patient home from hospital last night around 1900.    B-(background): 7/22/2020 Procedure: Total vaginal hysterectomy, bilateral salpingectomy, cystoscopy     A-(assessment): POD #1, post op pain    R-(recommendations): Reassurance provided. Reviewed pain following procedures and realistic expectation of pain control.    Patient advised to create a spreadsheet on paper and document administration of Acetaminophen, Ibuprofen, and Oxycodone. Patient advised to write down time of day it is to be taken per prescription directions and follow and record when taken. Idea for this is to keep patient on track for taking pain medication regularly to optimize pain control.    Patient advised to take it easy and rest. Allow time for healing.     Will send to Dr. Chaidez as FYI and for any additional advice. Will call patient later this afternoon to " check on pain.    Renuka Peters   [facility]  RN

## 2020-07-23 NOTE — TELEPHONE ENCOUNTER
Agree with advice given.  Recommend taking 2 oxy at a time for now.  Can call in refill if necessary, but it sounds like she has enough to last at least through today.      Also she can try other supportive management like hot packs or cold packs depending on preference.      I could also try adding gabapentin and/or vistaril if she wants to try additional medication    Mago Chaidez M.D.

## 2020-07-23 NOTE — TELEPHONE ENCOUNTER
Pt states she spoke to someone earlier about her pain and it is not getting better.    -Violetta Arteaga  [facility] [place]

## 2020-07-23 NOTE — TELEPHONE ENCOUNTER
"Patient returning call to clinic.  States she has been taking pain medication as instructed today. Patient has been taking 2 Oxycodone every 3 hours along with the Ibuprofen and Acetaminophen.      Patient reports pain is currently at 7/10. Patient reports \" this is just not working for me. I need something else. I can try one of those other 2 things she recommended but I want something different than the Oxycodone.\"    Will send to Dr. Chaidez to review and advise.  Thank you.    Renuka Peters   [facility]  RN      "

## 2020-07-23 NOTE — TELEPHONE ENCOUNTER
Reason for Call:  Other     Detailed comments: Pt had hysterectomy 07/22. Says she is having a lot of pain and the pain medication is not helping. Was not able to sleep last night. Requesting a different prescription    PHARMACY:  [facility] [place]     Please contact pt    Phone Number Patient can be reached at: Home number on file [phone] (home)    Best Time: Any    Can we leave a detailed message on this number? YES    Call taken on 7/23/2020 at 9:18 AM by Denise Behrendt

## 2020-07-23 NOTE — TELEPHONE ENCOUNTER
Pt notified of below.  Pt reports understanding.  Pt does not have further questions or concerns.    Patient will check in tomorrow with clinic to update us as to how she is doing on the hydrocodone.    Renuka Peters   [facility]  RN

## 2020-07-23 NOTE — TELEPHONE ENCOUNTER
Norco and vistaril ordered    Patient to call us tomorrow to see how this works.  She should continue with ibuprofen and tylenol.  She should not mix oxycodone with norco.  Inform patient Narcan was ordered for system recommendations.     Mago Chaidez M.D.

## 2020-07-24 LAB — COPATH REPORT: NORMAL

## 2020-07-24 RX ORDER — HYDROCODONE BITARTRATE AND ACETAMINOPHEN 5; 325 MG/1; MG/1
1-2 TABLET ORAL EVERY 4 HOURS PRN
Qty: 20 TABLET | Refills: 0 | Status: SHIPPED | OUTPATIENT
Start: 2020-07-24 | End: 2020-07-30

## 2020-07-24 NOTE — RESULT ENCOUNTER NOTE
Pt notified of below.  Pt reports understanding.  Pt does not have further questions or concerns.    Renuka Peters   [facility]  RN

## 2020-07-24 NOTE — TELEPHONE ENCOUNTER
Reason for call:  Patient reporting a symptom    Symptom or request: Pt states the medication did help a lot more yesterday.  Requesting a refill to [facility] [place].    Additional comments:     Phone Number patient can be reached at:  Home number on file [phone] (home)    Best Time:      Can we leave a detailed message on this number:  YES    Call taken on 7/24/2020 at 10:35 AM by Violetta Arteaga

## 2020-07-24 NOTE — TELEPHONE ENCOUNTER
Medication refilled.  Please remind patient to discontinue extra tylenol since Norco already has in place.     Please reach out to patient on Monday to see how she is doing.     Mago Chaidez M.D.

## 2020-07-24 NOTE — TELEPHONE ENCOUNTER
Pharmacy calling to let provider know that insurance will only cover 8 tabs of the norco per day.  Instead of a 2 day supply the 20 will be a 3 day supply.    Pharmacy needs an ok to fill - pt is there now to .    [phone] [facility] [place].    -Violetta SARGENT [facility]  [facility] [place]

## 2020-07-29 ENCOUNTER — TELEPHONE (OUTPATIENT)
Dept: OBGYN | Facility: CLINIC | Age: 30
End: 2020-07-29

## 2020-07-29 ENCOUNTER — NURSE TRIAGE (OUTPATIENT)
Dept: NURSING | Facility: CLINIC | Age: 30
End: 2020-07-29

## 2020-07-29 DIAGNOSIS — Z98.890 POSTOPERATIVE STATE: ICD-10-CM

## 2020-07-29 DIAGNOSIS — Z98.890 POSTOPERATIVE STATE: Primary | ICD-10-CM

## 2020-07-29 LAB
ALBUMIN SERPL-MCNC: 3.8 G/DL (ref 3.4–5)
ALP SERPL-CCNC: 148 U/L (ref 40–150)
ALT SERPL W P-5'-P-CCNC: 103 U/L (ref 0–50)
ANION GAP SERPL CALCULATED.3IONS-SCNC: 4 MMOL/L (ref 3–14)
AST SERPL W P-5'-P-CCNC: 36 U/L (ref 0–45)
BASOPHILS # BLD AUTO: 0 10E9/L (ref 0–0.2)
BASOPHILS NFR BLD AUTO: 0.2 %
BILIRUB SERPL-MCNC: 0.5 MG/DL (ref 0.2–1.3)
BUN SERPL-MCNC: 10 MG/DL (ref 7–30)
CALCIUM SERPL-MCNC: 9 MG/DL (ref 8.5–10.1)
CHLORIDE SERPL-SCNC: 104 MMOL/L (ref 94–109)
CO2 SERPL-SCNC: 27 MMOL/L (ref 20–32)
CREAT SERPL-MCNC: 0.69 MG/DL (ref 0.52–1.04)
DIFFERENTIAL METHOD BLD: NORMAL
EOSINOPHIL # BLD AUTO: 0.1 10E9/L (ref 0–0.7)
EOSINOPHIL NFR BLD AUTO: 1.3 %
ERYTHROCYTE [DISTWIDTH] IN BLOOD BY AUTOMATED COUNT: 13.7 % (ref 10–15)
GFR SERPL CREATININE-BSD FRML MDRD: >90 ML/MIN/{1.73_M2}
GLUCOSE SERPL-MCNC: 109 MG/DL (ref 70–99)
HCT VFR BLD AUTO: 40.3 % (ref 35–47)
HGB BLD-MCNC: 13.3 G/DL (ref 11.7–15.7)
LYMPHOCYTES # BLD AUTO: 2.3 10E9/L (ref 0.8–5.3)
LYMPHOCYTES NFR BLD AUTO: 22.9 %
MCH RBC QN AUTO: 28.7 PG (ref 26.5–33)
MCHC RBC AUTO-ENTMCNC: 33 G/DL (ref 31.5–36.5)
MCV RBC AUTO: 87 FL (ref 78–100)
MONOCYTES # BLD AUTO: 0.7 10E9/L (ref 0–1.3)
MONOCYTES NFR BLD AUTO: 7.4 %
NEUTROPHILS # BLD AUTO: 6.8 10E9/L (ref 1.6–8.3)
NEUTROPHILS NFR BLD AUTO: 68.2 %
PLATELET # BLD AUTO: 328 10E9/L (ref 150–450)
POTASSIUM SERPL-SCNC: 3.8 MMOL/L (ref 3.4–5.3)
PROT SERPL-MCNC: 7.9 G/DL (ref 6.8–8.8)
RBC # BLD AUTO: 4.64 10E12/L (ref 3.8–5.2)
SODIUM SERPL-SCNC: 135 MMOL/L (ref 133–144)
WBC # BLD AUTO: 10 10E9/L (ref 4–11)

## 2020-07-29 PROCEDURE — 80053 COMPREHEN METABOLIC PANEL: CPT | Performed by: OBSTETRICS & GYNECOLOGY

## 2020-07-29 PROCEDURE — 85025 COMPLETE CBC W/AUTO DIFF WBC: CPT | Performed by: OBSTETRICS & GYNECOLOGY

## 2020-07-29 PROCEDURE — 36415 COLL VENOUS BLD VENIPUNCTURE: CPT | Performed by: OBSTETRICS & GYNECOLOGY

## 2020-07-29 NOTE — TELEPHONE ENCOUNTER
I think we need to know if she is having fevers.  Is there any way for her to either get a thermometer or borrow one?    I could see her in office tomorrow and we could pre-order labs (CBC with diff and CMP).      Even if her BMs are small and hard, she is able to move her bowels, so that is reassuring.  Recommend continued stool softeners.      As far as body aches, that is a really non-specific symptom, so it is hard to know what to make of it without knowing her temp.  Continue with tylenol/ibuprofen.     Mago Chaidez M.D.

## 2020-07-29 NOTE — TELEPHONE ENCOUNTER
"Return call to patient.  Spoke with patient on the phone.  Update from triage note from this morning    S-(situation): two questions that patient has immediately,  \"Am I getting sick? Am I getting an infection? \"    Patient reports \"my body aches all day long like I am getting a flu or something.\" Patient reports this started on Sunday when patient woke up it. Patient reports she is not able to sleep due to body aching and also cramping which she feels like she feel s    Patient reports sweats and chills last night, does not feel like she is burning up.     Patient does not have a thermometer.    Patient denies any vaginal bleeding.     Patient reports some dizziness and lightheaded on and off, when up and doing things. Patient reports a couple of headaches \" here and there.\"     Patient reports she is off hydrocodone now. Patient taking Ibuprofen 600 mg prn.     Patient reports normal appetite, not eating much during the day and eats at dinner. Patient reports food is not appealing right now. Patient reports taste and smell is fine.     B-(background): 7/22/2020 Total vaginal hysterectomy, bilateral salpingectomy, cystoscopy     A-(assessment): post op body aches, sweats, chills     R-(recommendations): Reassurance provided. Attempt at getting thermometer. Ibuprofen and Tylenol. Aggressive hydration. Stool softeners.    Lab appointment for today and clinic appointment tomorrow with Dr. Chaidez.    See other triage note for additional details.    Pt in agreement and reports understanding.      Renuka Peters   [facility]  RN      "

## 2020-07-29 NOTE — TELEPHONE ENCOUNTER
Just had vaginal hysterectomy 7/22 with Dr. Chaidez out of [place]    On Sunday she woke up with body aches and she vomited a small amount only one time  Since then has been having the body aches, but no further vomiting  -rates body aches 5/10  -hot/cold flashes   -constant   -No thermometer so does not know if she has a fever    Off and on lightheadedness and dizziness  -Intermittent  -rates 3-4/10  -happens when up and moving around  -questions if it from doing too much  -sits down to breath for a bit and that helps    Patient is complaining of continued Constipation   -was unable to pass stool until 3 days ago  -taking stool softener  -3 days ago, and then every day since then, she has been able to have bowel movements.   -They are small, hard and round.     Still has some abdominal cramping, feels like waiting for period to come.   -not too severe  -what she expected    No vaginal bleeding  No vaginal discharge  No vaginal odors   No abdominal pain, except minor cramps   No urinary symptoms  No diarrhea  No constant headaches    Triaged to a disposition of Immediate office evaluation. OK to have in person appointment with Dr. Chaidez or her team tomorrow, as patient would like to wait until then. Call transferred to scheduling, but patient hung up prior to scheduling picking up. Scheduling attempted to reach patient but there was no answer.   -Patient had mentioned that she wanted to talk to Dr. Chaidez's Nurse tomorrow to see if she needed in person or telephone.   -This RN had advised in person due to unknown if has fever, and post-op circumstances.     COVID 19 Nurse Triage Plan/Patient Instructions    Please be aware that novel coronavirus (COVID-19) may be circulating in the community. If you develop symptoms such as fever, cough, or SOB or if you have concerns about the presence of another infection including coronavirus (COVID-19), please contact your health care provider or visit [email or web address].      Disposition/Instructions    In-Person Visit with provider recommended. Reference Visit Selection Guide.    Thank you for taking steps to prevent the spread of this virus.  o Limit your contact with others.  o Wear a simple mask to cover your cough.  o Wash your hands well and often.    Resources    [facility]: About COVID-19: [email or web address]    CDC: What to Do If You're Sick: www.cdc.gov/coronavirus/2019-ncov/about/steps-when-sick.html    CDC: Ending Home Isolation: www.cdc.gov/coronavirus/2019-ncov/hcp/disposition-in-home-patients.html     CDC: Caring for Someone: www.cdc.gov/coronavirus/2019-ncov/if-you-are-sick/care-for-someone.html     [facility]: Interim Guidance for Hospital Discharge to Home: www.[facility].[place]/diseases/coronavirus/hcp/hospdischarge.pdf    [facility] clinical trials (COVID-19 research studies): [email or web address].[facility]-clinical-trials     Below are the COVID-19 hotlines at the [place] Department of [facility] ([facility]). Interpreters are available.   o For health questions: Call 785-886-3888 or 1-543.540.6693 (7 a.m. to 7 p.m.)  o For questions about schools and childcare: Call 240-381-1547 or 1-370.385.1243 (7 a.m. to 7 p.m.)     Reason for Disposition    [1] Caller has URGENT question AND [2] triager unable to answer question    Additional Information    Negative: Sounds like a life-threatening emergency to the triager    Negative: Chest pain    Negative: Difficulty breathing    Negative: Surgical incision symptoms and questions    Negative: [1] Discomfort (pain, burning or stinging) when passing urine AND [2] male    Negative: [1] Discomfort (pain, burning or stinging) when passing urine AND [2] female    Negative: Constipation    Negative: New or worsening leg (calf, thigh) pain    Negative: New or worsening leg swelling    Negative: Dizziness is severe, or persists > 24 hours after surgery    Negative: Pain, redness, swelling, or pus at IV Site     Negative: Symptoms arising from use of a urinary catheter ([place] or [place])    Negative: Cast problems or questions    Negative: Medication question    Negative: [1] Widespread rash AND [2] bright red, sunburn-like    Negative: [1] SEVERE headache AND [2] after spinal (epidural) anesthesia    Negative: [1] Vomiting AND [2] persists > 4 hours    Negative: [1] Vomiting AND [2] abdomen looks much more swollen than usual    Negative: [1] Drinking very little AND [2] dehydration suspected (e.g., no urine > 12 hours, very dry mouth, very lightheaded)    Negative: Patient sounds very sick or weak to the triager    Negative: Sounds like a serious complication to the triager    Negative: [1] SEVERE post-op pain (e.g., excruciating, pain scale 8-10) AND [2] not controlled with pain medications    Negative: Fever > 100.5 F (38.1 C)    Protocols used: POST-OP SYMPTOMS AND [id number]    Naima Burnett RN on 7/29/2020 at 2:13 AM

## 2020-07-29 NOTE — TELEPHONE ENCOUNTER
Reason for call:  Patient reporting a symptom    Symptom or request: Had surgery a week ago - starting Sunday woke up with body aching like a cold or flu sx.  Later took a shower and vomited in the shower.  Hasn't thrown up since but still has body aches - all day every day since Sunday.    Duration (how long have symptoms been present): since Sunday    Have you been treated for this before? No    Additional comments:     Phone Number patient can be reached at:  Home number on file [phone] (home)    Best Time:      Can we leave a detailed message on this number:  YES    Call taken on 7/29/2020 at 8:55 AM by Violetta Arteaga

## 2020-07-29 NOTE — TELEPHONE ENCOUNTER
Will route to Dr. Chaidez to review and advise on recommendations for patient. Dr. Chaidez doing virtual visits tomorrow ( Thursday ), full on Friday.     Do you want patient to be seen in ER?  Urgent Care?  Continue to monitor?    [facility] visit with onset of body aches as this can be isolated symptom of COVID     Thank you.    Renuka Peters   [facility]  RN

## 2020-07-29 NOTE — TELEPHONE ENCOUNTER
Pt notified of below.  Pt reports understanding.  Pt does not have further questions or concerns.    Assisted patient with lab appointment for today and clinic appointment tomorrow.    Renuka Peters   [facility]  RN

## 2020-07-30 ENCOUNTER — TELEPHONE (OUTPATIENT)
Dept: OBGYN | Facility: CLINIC | Age: 30
End: 2020-07-30

## 2020-07-30 ENCOUNTER — OFFICE VISIT (OUTPATIENT)
Dept: OBGYN | Facility: CLINIC | Age: 30
End: 2020-07-30
Payer: COMMERCIAL

## 2020-07-30 VITALS
SYSTOLIC BLOOD PRESSURE: 123 MMHG | DIASTOLIC BLOOD PRESSURE: 82 MMHG | WEIGHT: 168.6 LBS | HEIGHT: 63 IN | BODY MASS INDEX: 29.88 KG/M2 | TEMPERATURE: 97.6 F | HEART RATE: 92 BPM | RESPIRATION RATE: 16 BRPM

## 2020-07-30 DIAGNOSIS — G89.18 POST-OP PAIN: Primary | ICD-10-CM

## 2020-07-30 PROCEDURE — 99024 POSTOP FOLLOW-UP VISIT: CPT | Performed by: OBSTETRICS & GYNECOLOGY

## 2020-07-30 RX ORDER — HYDROCODONE BITARTRATE AND IBUPROFEN 5; 200 MG/1; MG/1
1 TABLET ORAL EVERY 6 HOURS PRN
Qty: 5 TABLET | Refills: 0 | Status: SHIPPED | OUTPATIENT
Start: 2020-07-30 | End: 2020-08-06

## 2020-07-30 ASSESSMENT — MIFFLIN-ST. JEOR: SCORE: 1463.89

## 2020-07-30 NOTE — PROGRESS NOTES
"Laura is a 29 year old 1 week s/p total vaginal hysterectomy  complicated by achiness.  She is currently requiring Ibuprofen and Vicodin for pain management.  - vaginal bleeding, - hot flashes.  - GI/ complaints.  Energy level is low.  Denies fever.    The pathology report showed:   Uterus, cervix, bilateral fallopian tubes: Total vaginal hysterectomy and   bilateral salpingectomy:   - Weakly proliferative endometrium, negative for hyperplasia and atypia   - Unremarkable myometrium   - Benign cervix   - Benign bilateral fallopian tubes, one with microscopic benign   adenomatoid tumor    PE: /82 (BP Location: Right arm, Cuff Size: Adult Regular)   Pulse 92   Temp 97.6  F (36.4  C)   Resp 16   Ht 1.6 m (5' 3\")   Wt 76.5 kg (168 lb 9.6 oz)   LMP 07/11/2020   BMI 29.87 kg/m    Abd: soft, non tender, no masses  NEFG  Vagina: cuff healing, no lesions    Component      Latest Ref Rng & Units 7/29/2020   WBC      4.0 - 11.0 10e9/L 10.0   RBC Count      3.8 - 5.2 10e12/L 4.64   Hemoglobin      11.7 - 15.7 g/dL 13.3   Hematocrit      35.0 - 47.0 % 40.3   MCV      78 - 100 fl 87   MCH      26.5 - 33.0 pg 28.7   MCHC      31.5 - 36.5 g/dL 33.0   RDW      10.0 - 15.0 % 13.7   Platelet Count      150 - 450 10e9/L 328   % Neutrophils      % 68.2   % Lymphocytes      % 22.9   % Monocytes      % 7.4   % Eosinophils      % 1.3   % Basophils      % 0.2   Absolute Neutrophil      1.6 - 8.3 10e9/L 6.8   Absolute Lymphocytes      0.8 - 5.3 10e9/L 2.3   Absolute Monocytes      0.0 - 1.3 10e9/L 0.7   Absolute Eosinophils      0.0 - 0.7 10e9/L 0.1   Absolute Basophils      0.0 - 0.2 10e9/L 0.0   Diff Method       Automated Method   Sodium      133 - 144 mmol/L 135   Potassium      3.4 - 5.3 mmol/L 3.8   Chloride      94 - 109 mmol/L 104   Carbon Dioxide      20 - 32 mmol/L 27   Anion Gap      3 - 14 mmol/L 4   Glucose      70 - 99 mg/dL 109 (H)   Urea Nitrogen      7 - 30 mg/dL 10   Creatinine      0.52 - 1.04 mg/dL 0.69 "   GFR Estimate      >60 mL/min/1.73:m2 >90   GFR Estimate If Black      >60 mL/min/1.73:m2 >90   Calcium      8.5 - 10.1 mg/dL 9.0   Bilirubin Total      0.2 - 1.3 mg/dL 0.5   Albumin      3.4 - 5.0 g/dL 3.8   Protein Total      6.8 - 8.8 g/dL 7.9   Alkaline Phosphatase      40 - 150 U/L 148   ALT      0 - 50 U/L 103 (H)   AST      0 - 45 U/L 36         A/P 1 week s/p tvh    1. Patient reassured regarding post-operative healing.  Discussed no evidence of post-operative infection given her normal WBC and afebrile status.  Patient pain medication refilled--will try to use quincy-profen rather than tylenol based since her LFTs are a little elevated.     Mago Chaidez M.D.

## 2020-07-30 NOTE — TELEPHONE ENCOUNTER
"Reason for call:  Patient reporting a symptom    Symptom or request: Pt states liver enzemes were high last time- told she should get it checked out.  Pt unsure where to go to have this \"looked at\"    Duration (how long have symptoms been present):     Have you been treated for this before? No    Additional comments:     Phone Number patient can be reached at:  Home number on file [phone] (home)    Best Time:      Can we leave a detailed message on this number:  YES    Call taken on 7/30/2020 at 1:39 PM by Violetta Arteaga    "

## 2020-08-04 ENCOUNTER — TELEPHONE (OUTPATIENT)
Dept: OBGYN | Facility: CLINIC | Age: 30
End: 2020-08-04

## 2020-08-04 DIAGNOSIS — G89.18 POST-OP PAIN: ICD-10-CM

## 2020-08-04 NOTE — TELEPHONE ENCOUNTER
Prior Authorization Retail Medication Request    Medication/Dose: Hydrocodone Ibuprofen 5-200mg  ICD code (if different than what is on RX):  Post-op pain [G89.18]  - Primary   Previously Tried and Failed:   week s/p total vaginal hysterectomy  complicated by achiness.  She is currently requiring Ibuprofen and Vicodin for pain management  Rationale:      Insurance Name:  [employer]  Insurance ID:  KEY: [id number]      Pharmacy Information (if different than what is on RX)  Name:  [facility]  Phone:  [phone]

## 2020-08-04 NOTE — TELEPHONE ENCOUNTER
Central Prior Authorization Team  Phone: [phone]    PA Initiation    Medication: Hydrocodone Ibuprofen 5-200mg  Insurance Company: [employer] - Phone [phone] Fax [phone]  Pharmacy Filling the Rx: [facility] [address] [place] [address] - [address]  Filling Pharmacy Phone: [phone]  Filling Pharmacy Fax:    Start Date: 8/4/2020

## 2020-08-05 NOTE — TELEPHONE ENCOUNTER
Prior Authorization Approval    Authorization Effective Date: 5/5/2020  Authorization Expiration Date: 8/5/2021  Medication: Hydrocodone Ibuprofen 5-200mg- APPROVED   Approved Dose/Quantity:   Reference #:     Insurance Company: [employer] - Phone [phone] Fax [phone]  Expected CoPay:       CoPay Card Available:      Foundation Assistance Needed:    Which Pharmacy is filling the prescription (Not needed for infusion/clinic administered): [facility] - [place], [place] - [address]  Pharmacy Notified: Yes  Patient Notified: Comment:  **Instructed pharmacy to notify patient when script is ready to /ship.**

## 2020-08-06 RX ORDER — HYDROCODONE BITARTRATE AND IBUPROFEN 5; 200 MG/1; MG/1
1 TABLET ORAL EVERY 6 HOURS PRN
Qty: 5 TABLET | Refills: 0 | Status: SHIPPED | OUTPATIENT
Start: 2020-08-06 | End: 2020-09-04

## 2020-08-06 NOTE — TELEPHONE ENCOUNTER
Rx refilled for same quantity.  This should be the last refill needed as we are now 2 weeks from surgery.     Mago Chaidez M.D.

## 2020-08-06 NOTE — TELEPHONE ENCOUNTER
Rec'd rx request for Hydrocodone, date last filled on 07-30-20 for a qty of 5 with 0 refills.    Date of last ov 07-30-20.    Message from pharmacy: Therapeutic Change-Non Formulary Please Change.    To provider to approve controlled substance.    Treva Lemon  [facility] RN

## 2020-09-04 ENCOUNTER — OFFICE VISIT (OUTPATIENT)
Dept: OBGYN | Facility: CLINIC | Age: 30
End: 2020-09-04
Payer: COMMERCIAL

## 2020-09-04 VITALS
HEART RATE: 77 BPM | DIASTOLIC BLOOD PRESSURE: 86 MMHG | BODY MASS INDEX: 29.41 KG/M2 | SYSTOLIC BLOOD PRESSURE: 123 MMHG | WEIGHT: 166 LBS | TEMPERATURE: 98.6 F

## 2020-09-04 DIAGNOSIS — Z09 POSTOP CHECK: Primary | ICD-10-CM

## 2020-09-04 PROBLEM — N94.6 DYSMENORRHEA: Status: RESOLVED | Noted: 2020-06-26 | Resolved: 2020-09-04

## 2020-09-04 PROBLEM — R10.2 PELVIC PAIN IN FEMALE: Status: RESOLVED | Noted: 2020-06-26 | Resolved: 2020-09-04

## 2020-09-04 PROCEDURE — 99024 POSTOP FOLLOW-UP VISIT: CPT | Performed by: OBSTETRICS & GYNECOLOGY

## 2020-09-04 RX ORDER — GABAPENTIN 300 MG/1
300 CAPSULE ORAL AT BEDTIME
COMMUNITY
End: 2020-09-24

## 2020-09-04 NOTE — NURSING NOTE
"Initial /86 (BP Location: Left arm, Patient Position: Chair, Cuff Size: Adult Regular)   Pulse 77   Temp 98.6  F (37  C) (Tympanic)   Wt 75.3 kg (166 lb)   LMP 07/11/2020   Breastfeeding No   BMI 29.41 kg/m   Estimated body mass index is 29.41 kg/m  as calculated from the following:    Height as of 7/30/20: 1.6 m (5' 3\").    Weight as of this encounter: 75.3 kg (166 lb). .    Johanne Mcnamara MA    "

## 2020-09-04 NOTE — PROGRESS NOTES
Laura is a 29 year old  6 weeks s/p total vaginal hysterectomy complicated by acute post-op pain control issues.  She is currently requiring nothing for pain management.  - vaginal bleeding, - hot flashes.  - GI/ complaints.  Energy level is good.  Denies fever.    The pathology report showed:   Uterus, cervix, bilateral fallopian tubes: Total vaginal hysterectomy and   bilateral salpingectomy:   - Weakly proliferative endometrium, negative for hyperplasia and atypia   - Unremarkable myometrium   - Benign cervix   - Benign bilateral fallopian tubes, one with microscopic benign   adenomatoid tumor    PE: /86 (BP Location: Left arm, Patient Position: Chair, Cuff Size: Adult Regular)   Pulse 77   Temp 98.6  F (37  C) (Tympanic)   Wt 75.3 kg (166 lb)   LMP 2020   Breastfeeding No   BMI 29.41 kg/m    Abd: soft, non tender, no masses  NEFG  Vagina: cuff well healed, no lesions  BME: deferred    A/P 6 weeks s/p tvh    1. No further restrictions.  RTC prn or annually    Mago Chaidez M.D.

## 2020-09-24 ENCOUNTER — VIRTUAL VISIT (OUTPATIENT)
Dept: FAMILY MEDICINE | Facility: CLINIC | Age: 30
End: 2020-09-24
Payer: COMMERCIAL

## 2020-09-24 DIAGNOSIS — F41.1 GAD (GENERALIZED ANXIETY DISORDER): ICD-10-CM

## 2020-09-24 DIAGNOSIS — M54.2 NECK PAIN: Primary | ICD-10-CM

## 2020-09-24 PROCEDURE — 99214 OFFICE O/P EST MOD 30 MIN: CPT | Mod: 95 | Performed by: NURSE PRACTITIONER

## 2020-09-24 RX ORDER — GABAPENTIN 300 MG/1
CAPSULE ORAL
Qty: 90 CAPSULE | Refills: 2 | Status: SHIPPED | OUTPATIENT
Start: 2020-09-24 | End: 2021-03-22

## 2020-09-24 RX ORDER — HYDROXYZINE PAMOATE 25 MG/1
25 CAPSULE ORAL 3 TIMES DAILY PRN
Qty: 30 CAPSULE | Refills: 2 | Status: SHIPPED | OUTPATIENT
Start: 2020-09-24 | End: 2021-02-01

## 2020-09-24 ASSESSMENT — ANXIETY QUESTIONNAIRES
1. FEELING NERVOUS, ANXIOUS, OR ON EDGE: NEARLY EVERY DAY
GAD7 TOTAL SCORE: 12
3. WORRYING TOO MUCH ABOUT DIFFERENT THINGS: NOT AT ALL
IF YOU CHECKED OFF ANY PROBLEMS ON THIS QUESTIONNAIRE, HOW DIFFICULT HAVE THESE PROBLEMS MADE IT FOR YOU TO DO YOUR WORK, TAKE CARE OF THINGS AT HOME, OR GET ALONG WITH OTHER PEOPLE: EXTREMELY DIFFICULT
6. BECOMING EASILY ANNOYED OR IRRITABLE: NEARLY EVERY DAY
7. FEELING AFRAID AS IF SOMETHING AWFUL MIGHT HAPPEN: NOT AT ALL
2. NOT BEING ABLE TO STOP OR CONTROL WORRYING: NOT AT ALL
5. BEING SO RESTLESS THAT IT IS HARD TO SIT STILL: NEARLY EVERY DAY

## 2020-09-24 ASSESSMENT — PATIENT HEALTH QUESTIONNAIRE - PHQ9
5. POOR APPETITE OR OVEREATING: NEARLY EVERY DAY
SUM OF ALL RESPONSES TO PHQ QUESTIONS 1-9: 17

## 2020-09-24 NOTE — PATIENT INSTRUCTIONS
Continue Prozac 20 mg daily  Try Vistaril 25 mg 3 times daily as needed for anxiety and sleep  Increase Gabapentin to 300 mg AM and 600 mg at bedtime    Let me know if no improvement in 2-3 weeks
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
## 2020-09-25 ASSESSMENT — ANXIETY QUESTIONNAIRES: GAD7 TOTAL SCORE: 12

## 2020-11-16 ENCOUNTER — HEALTH MAINTENANCE LETTER (OUTPATIENT)
Age: 30
End: 2020-11-16

## 2021-01-19 ENCOUNTER — HOSPITAL ENCOUNTER (EMERGENCY)
Facility: CLINIC | Age: 31
Discharge: HOME | End: 2021-01-19
Attending: NURSE PRACTITIONER | Admitting: NURSE PRACTITIONER
Payer: COMMERCIAL

## 2021-01-19 VITALS
HEART RATE: 80 BPM | RESPIRATION RATE: 18 BRPM | BODY MASS INDEX: 29.23 KG/M2 | OXYGEN SATURATION: 98 % | SYSTOLIC BLOOD PRESSURE: 119 MMHG | HEIGHT: 63 IN | WEIGHT: 165 LBS | DIASTOLIC BLOOD PRESSURE: 74 MMHG | TEMPERATURE: 97.6 F

## 2021-01-19 DIAGNOSIS — M62.89 MUSCLE TIGHTNESS: ICD-10-CM

## 2021-01-19 DIAGNOSIS — M54.2 NECK PAIN: ICD-10-CM

## 2021-01-19 PROCEDURE — G0463 HOSPITAL OUTPT CLINIC VISIT: HCPCS | Performed by: NURSE PRACTITIONER

## 2021-01-19 PROCEDURE — 99214 OFFICE O/P EST MOD 30 MIN: CPT | Performed by: NURSE PRACTITIONER

## 2021-01-19 RX ORDER — CYCLOBENZAPRINE HCL 10 MG
10 TABLET ORAL 3 TIMES DAILY PRN
Qty: 15 TABLET | Refills: 0 | Status: SHIPPED | OUTPATIENT
Start: 2021-01-19 | End: 2021-05-06

## 2021-01-19 ASSESSMENT — MIFFLIN-ST. JEOR: SCORE: 1442.57

## 2021-01-19 ASSESSMENT — ENCOUNTER SYMPTOMS
NECK PAIN: 1
CONSTITUTIONAL NEGATIVE: 1
RESPIRATORY NEGATIVE: 1
NEUROLOGICAL NEGATIVE: 1
CARDIOVASCULAR NEGATIVE: 1
BACK PAIN: 1

## 2021-01-20 NOTE — ED PROVIDER NOTES
History     Chief Complaint   Patient presents with     Neck Pain     old injury frin years ago. oncresed pain over past month     HPI  Laura Grewal is a 30 year old female who presents the urgent care for evaluation of neck pain.  Patient reports that she previously injured her neck many years ago at work ([employer]).  Since this injury she has been combating neck muscle strain.  Over the course of the last month the neck muscle tightness has worsened and is radiating into the bilateral shoulders, right worse than left. No new injury or trauma. Reports the entire upper back/neck/shoulders feeling tight and is effecting her sleep. Had previously tried physical therapy. Tylenol, ibuprofen, hot showers and Icy Hot. Denies fevers, headache, dizziness, numbness and tingling, saddle anesthesia, and loss of bowel or bladder control.     Allergies:  Allergies   Allergen Reactions     Tramadol Other (See Comments)     Mood swings     Zoloft [Serotonin Reuptake Inhibitors] Other (See Comments)     Migraine Headaches     Methylergonovine Anxiety       Problem List:    Patient Active Problem List    Diagnosis Date Noted     Insomnia, idiopathic 07/20/2018     Priority: Medium     Migraine without status migrainosus, not intractable, unspecified migraine type 06/09/2016     Priority: Medium     KERRY (generalized anxiety disorder) 06/09/2016     Priority: Medium     Did not tolerate Zoloft.  Currently trying lexapro       Intermittent asthma 05/27/2014     Priority: Medium     GERD (gastroesophageal reflux disease) 06/10/2013     Priority: Medium     Low back pain 07/03/2011     Priority: Medium     Psoriasis 10/16/2008     Priority: Medium     Has tried clindamycin, triamcinolone, and differin.  On nothing currently.       Congenital vascular nevus 05/24/2007     Priority: Medium     Right forehead       Temporomandibular joint disorder 03/20/2007     Priority: Medium     right side       CARDIOVASCULAR  SCREENING; LDL GOAL LESS THAN 130 01/31/2012     Priority: Low        Past Medical History:    Past Medical History:   Diagnosis Date     Acute sinusitis, unspecified      Chickenpox      Dysmenorrhea 06/26/2020     Genital warts 12/9/2015     Sinus tachycardia 1/14/2015     Uncomplicated asthma      Unspecified otitis media        Past Surgical History:    Past Surgical History:   Procedure Laterality Date     HYSTERECTOMY VAGINAL N/A 7/22/2020    Procedure: total vaginal hysterectomy,  bilateral salpigectomy;  Surgeon: Mago Chaidez MD;  Location: [facility] OR     MOUTH SURGERY      wisdom teeth       Family History:    Family History   Problem Relation Age of Onset     C.A.D. Maternal Grandmother      Asthma Maternal Grandmother      Heart Disease Maternal Grandmother         pacemaker     Dementia Maternal Grandfather      Cancer - colorectal Paternal Grandmother         colon     Cerebrovascular Disease Paternal Grandmother      Celiac Disease Sister      Coronary Artery Disease Paternal Grandfather         also pacemaker     Asthma Sister      Gastrointestinal Disease Sister         celiac       Social History:  Marital Status:  Single [1]  Social History     Tobacco Use     Smoking status: Never Smoker     Smokeless tobacco: Never Used   Substance Use Topics     Alcohol use: Yes     Alcohol/week: 0.0 standard drinks     Comment: very little     Drug use: No        Medications:         cyclobenzaprine (FLEXERIL) 10 MG tablet       diclofenac (VOLTAREN) 1 % topical gel       albuterol (PROAIR HFA/PROVENTIL HFA/VENTOLIN HFA) 108 (90 Base) MCG/ACT inhaler       FLUoxetine (PROZAC) 20 MG capsule       gabapentin (NEURONTIN) 300 MG capsule       hydrOXYzine (VISTARIL) 25 MG capsule       rizatriptan (MAXALT) 10 MG tablet          Review of Systems   Constitutional: Negative.    Respiratory: Negative.    Cardiovascular: Negative.    Musculoskeletal: Positive for back pain and neck pain.   Skin: Negative.   "  Neurological: Negative.    All other systems reviewed and are negative.      Physical Exam   BP: 119/74  Pulse: 80  Temp: 97.6  F (36.4  C)  Resp: 18  Height: 160 cm (5' 3\")  Weight: 74.8 kg (165 lb)  SpO2: 98 %    Physical Exam  Constitutional:       General: She is not in acute distress.     Appearance: Normal appearance.   Neck:      Musculoskeletal: Full passive range of motion without pain and normal range of motion. Muscular tenderness (diffuse ludivina-spinal 'tight' pain and into the bilateral trapezius muscles, R>L) present. No crepitus, injury, pain with movement, torticollis or spinous process tenderness.   Cardiovascular:      Rate and Rhythm: Normal rate.   Pulmonary:      Effort: Pulmonary effort is normal.   Musculoskeletal: Normal range of motion.   Skin:     General: Skin is warm.      Capillary Refill: Capillary refill takes less than 2 seconds.   Neurological:      General: No focal deficit present.      Mental Status: She is alert.   Psychiatric:         Mood and Affect: Mood normal.       ED Course        Procedures    No results found for this or any previous visit (from the past 24 hour(s)).    Medications - No data to display    Assessments & Plan (with Medical Decision Making)   Laura Grewal is a 30 year old female who presents the urgent care for evaluation of neck pain.  Patient reports that she previously injured her neck many years ago at work ([employer]).  Since this injury she has been combating neck muscle strain.  Over the course of the last month the neck muscle tightness has worsened and is radiating into the bilateral shoulders, right worse than left. No new injury or trauma. Reports the entire upper back/neck/shoulders feeling tight and is effecting her sleep. Had previously tried physical therapy. Tylenol, ibuprofen, hot showers and Icy Hot.     Exam as above, no worrisome findings. Vital signs normal. No need for imaging at this time. Rx for flexeril and " diclofenac. Suggested trying physical therapy again, chiropractics, massage. Return precautions reviewed, all questions answered. Patient is agreeable to plan of care and discharged in no acute distress.     I have reviewed the nursing notes.    I have reviewed the findings, diagnosis, plan and need for follow up with the patient.    Discharge Medication List as of 1/19/2021  6:23 PM      START taking these medications    Details   cyclobenzaprine (FLEXERIL) 10 MG tablet Take 1 tablet (10 mg) by mouth 3 times daily as needed for muscle spasms, Disp-15 tablet, R-0, E-Prescribe      diclofenac (VOLTAREN) 1 % topical gel Apply 4 g topically 4 times daily for 7 days, Disp-112 g, R-0, E-Prescribe             Final diagnoses:   Neck pain   Muscle tightness       1/19/2021   [facility] EMERGENCY DEPT     Julieta Dey, APRN CNP  01/19/21 5530

## 2021-01-29 DIAGNOSIS — F41.1 GAD (GENERALIZED ANXIETY DISORDER): ICD-10-CM

## 2021-01-29 NOTE — TELEPHONE ENCOUNTER
Patient is out of medication, would like refill today, if possible. Virginia Hernandez on 1/29/2021 at 3:25 PM

## 2021-02-01 RX ORDER — HYDROXYZINE PAMOATE 25 MG/1
CAPSULE ORAL
Qty: 30 CAPSULE | Refills: 0 | Status: SHIPPED | OUTPATIENT
Start: 2021-02-01 | End: 2021-03-02

## 2021-02-01 NOTE — TELEPHONE ENCOUNTER
"Requested Prescriptions   Pending Prescriptions Disp Refills     hydrOXYzine (VISTARIL) 25 MG capsule [Pharmacy Med Name: hydrOXYzine Pamoate 25 MG Oral Capsule] 30 capsule 0     Sig: TAKE 1 CAPSULE BY MOUTH THREE TIMES DAILY AS NEEDED FOR ANXIETY OR  INSOMNIA       Antihistamines Protocol Passed - 1/29/2021  3:34 PM        Passed - Recent (12 mo) or future (30 days) visit within the authorizing provider's specialty     Patient has had an office visit with the authorizing provider or a provider within the authorizing providers department within the previous 12 mos or has a future within next 30 days. See \"Patient Info\" tab in inbasket, or \"Choose Columns\" in Meds & Orders section of the refill encounter.              Passed - Patient is age 3 or older     Apply age and/or weight-based dosing for peds patients age 3 and older.    Forward request to provider for patients under the age of 3.          Passed - Medication is active on med list             "

## 2021-02-27 DIAGNOSIS — F41.1 GAD (GENERALIZED ANXIETY DISORDER): ICD-10-CM

## 2021-03-01 NOTE — TELEPHONE ENCOUNTER
"Requested Prescriptions   Pending Prescriptions Disp Refills     hydrOXYzine (VISTARIL) 25 MG capsule [Pharmacy Med Name: hydrOXYzine Pamoate 25 MG Oral Capsule] 30 capsule 0     Sig: TAKE 1 CAPSULE BY MOUTH THREE TIMES DAILY AS NEEDED FOR ANXIETY OR  INSOMNIA       Antihistamines Protocol Passed - 2/27/2021  3:25 PM        Passed - Recent (12 mo) or future (30 days) visit within the authorizing provider's specialty     Patient has had an office visit with the authorizing provider or a provider within the authorizing providers department within the previous 12 mos or has a future within next 30 days. See \"Patient Info\" tab in inbasket, or \"Choose Columns\" in Meds & Orders section of the refill encounter.              Passed - Patient is age 3 or older     Apply age and/or weight-based dosing for peds patients age 3 and older.    Forward request to provider for patients under the age of 3.          Passed - Medication is active on med list             "

## 2021-03-02 RX ORDER — HYDROXYZINE PAMOATE 25 MG/1
CAPSULE ORAL
Qty: 30 CAPSULE | Refills: 0 | Status: SHIPPED | OUTPATIENT
Start: 2021-03-02 | End: 2021-03-23

## 2021-03-20 DIAGNOSIS — M54.2 NECK PAIN: ICD-10-CM

## 2021-03-22 RX ORDER — GABAPENTIN 300 MG/1
CAPSULE ORAL
Qty: 90 CAPSULE | Refills: 3 | Status: SHIPPED | OUTPATIENT
Start: 2021-03-22

## 2021-03-22 NOTE — TELEPHONE ENCOUNTER
Requested Prescriptions   Pending Prescriptions Disp Refills     gabapentin (NEURONTIN) 300 MG capsule [Pharmacy Med Name: Gabapentin 300 MG Oral Capsule] 90 capsule 0     Sig: TAKE 1 CAPSULE BY MOUTH IN THE MORNING AND 2 AT BEDTIME       There is no refill protocol information for this order

## 2021-03-23 DIAGNOSIS — F41.1 GAD (GENERALIZED ANXIETY DISORDER): ICD-10-CM

## 2021-03-24 RX ORDER — HYDROXYZINE PAMOATE 25 MG/1
CAPSULE ORAL
Qty: 30 CAPSULE | Refills: 0 | Status: SHIPPED | OUTPATIENT
Start: 2021-03-24

## 2021-05-06 ENCOUNTER — TELEPHONE (OUTPATIENT)
Dept: FAMILY MEDICINE | Facility: CLINIC | Age: 31
End: 2021-05-06

## 2021-05-06 ENCOUNTER — OFFICE VISIT (OUTPATIENT)
Dept: FAMILY MEDICINE | Facility: CLINIC | Age: 31
End: 2021-05-06
Payer: COMMERCIAL

## 2021-05-06 VITALS
RESPIRATION RATE: 16 BRPM | HEART RATE: 102 BPM | HEIGHT: 63 IN | SYSTOLIC BLOOD PRESSURE: 122 MMHG | DIASTOLIC BLOOD PRESSURE: 72 MMHG | OXYGEN SATURATION: 98 % | WEIGHT: 171 LBS | TEMPERATURE: 97.7 F | BODY MASS INDEX: 30.3 KG/M2

## 2021-05-06 DIAGNOSIS — R09.81 SINUS CONGESTION: ICD-10-CM

## 2021-05-06 DIAGNOSIS — M54.2 CHRONIC NECK PAIN: Primary | ICD-10-CM

## 2021-05-06 DIAGNOSIS — G89.29 CHRONIC NECK PAIN: Primary | ICD-10-CM

## 2021-05-06 DIAGNOSIS — F41.1 GAD (GENERALIZED ANXIETY DISORDER): ICD-10-CM

## 2021-05-06 LAB
LABORATORY COMMENT REPORT: NORMAL
SARS-COV-2 RNA RESP QL NAA+PROBE: NEGATIVE
SARS-COV-2 RNA RESP QL NAA+PROBE: NORMAL
SPECIMEN SOURCE: NORMAL
SPECIMEN SOURCE: NORMAL

## 2021-05-06 PROCEDURE — 99214 OFFICE O/P EST MOD 30 MIN: CPT | Performed by: NURSE PRACTITIONER

## 2021-05-06 PROCEDURE — U0003 INFECTIOUS AGENT DETECTION BY NUCLEIC ACID (DNA OR RNA); SEVERE ACUTE RESPIRATORY SYNDROME CORONAVIRUS 2 (SARS-COV-2) (CORONAVIRUS DISEASE [COVID-19]), AMPLIFIED PROBE TECHNIQUE, MAKING USE OF HIGH THROUGHPUT TECHNOLOGIES AS DESCRIBED BY CMS-2020-01-R: HCPCS | Performed by: NURSE PRACTITIONER

## 2021-05-06 PROCEDURE — U0005 INFEC AGEN DETEC AMPLI PROBE: HCPCS | Performed by: NURSE PRACTITIONER

## 2021-05-06 RX ORDER — FLUOXETINE 20 MG/1
30 TABLET, FILM COATED ORAL DAILY
Qty: 45 TABLET | Refills: 3 | Status: SHIPPED | OUTPATIENT
Start: 2021-05-06

## 2021-05-06 RX ORDER — CELECOXIB 100 MG/1
100 CAPSULE ORAL 2 TIMES DAILY PRN
Qty: 60 CAPSULE | Refills: 1 | Status: SHIPPED | OUTPATIENT
Start: 2021-05-06

## 2021-05-06 ASSESSMENT — ANXIETY QUESTIONNAIRES
5. BEING SO RESTLESS THAT IT IS HARD TO SIT STILL: NEARLY EVERY DAY
IF YOU CHECKED OFF ANY PROBLEMS ON THIS QUESTIONNAIRE, HOW DIFFICULT HAVE THESE PROBLEMS MADE IT FOR YOU TO DO YOUR WORK, TAKE CARE OF THINGS AT HOME, OR GET ALONG WITH OTHER PEOPLE: NOT DIFFICULT AT ALL
GAD7 TOTAL SCORE: 21
1. FEELING NERVOUS, ANXIOUS, OR ON EDGE: NEARLY EVERY DAY
3. WORRYING TOO MUCH ABOUT DIFFERENT THINGS: NEARLY EVERY DAY
7. FEELING AFRAID AS IF SOMETHING AWFUL MIGHT HAPPEN: NEARLY EVERY DAY
6. BECOMING EASILY ANNOYED OR IRRITABLE: NEARLY EVERY DAY
2. NOT BEING ABLE TO STOP OR CONTROL WORRYING: NEARLY EVERY DAY

## 2021-05-06 ASSESSMENT — PATIENT HEALTH QUESTIONNAIRE - PHQ9
5. POOR APPETITE OR OVEREATING: NEARLY EVERY DAY
SUM OF ALL RESPONSES TO PHQ QUESTIONS 1-9: 14

## 2021-05-06 ASSESSMENT — MIFFLIN-ST. JEOR: SCORE: 1469.78

## 2021-05-06 NOTE — TELEPHONE ENCOUNTER
Prior Authorization Retail Medication Request    Medication/Dose: fluoxetine  ICD code (if different than what is on RX):    Previously Tried and Failed:  Elavil; celexa; lexapro; zoloft; desyrel; fluoxetine 10 mg  Rationale:  Patient has used since 2014 and has failed at lower dosing.      Insurance Name:  BC/[employer] NIELS[employer]  Insurance ID:  Not provided  M Key: [id number]      Pharmacy Information (if different than what is on RX)  Name:    Phone:

## 2021-05-06 NOTE — LETTER
May 10, 2021      Laura Grewal  [address] [facility] [address]        Dear ,    We are writing to inform you of your test results.    Covid negative.    Resulted Orders   Symptomatic COVID-19 Virus (Coronavirus) by PCR   Result Value Ref Range    COVID-19 Virus PCR to [facility] - Source Nasopharyngeal     COVID-19 Virus PCR to [facility] - Result       Test received-See reflex to IDDL test SARS CoV2 (COVID-19) Virus RT-PCR   SARS-CoV-2 COVID-19 Virus (Coronavirus) by PCR   Result Value Ref Range    SARS-CoV-2 Virus Specimen Source Nasopharyngeal     SARS-CoV-2 PCR Result NEGATIVE       Comment:      SARS-CoV2 (COVID-19) RNA not detected, presumed negative.    SARS-CoV-2 PCR Comment       Testing was performed using the [employer] SARS-CoV-2 Assay on the [employer] Instrument System.   Additional information about this Emergency Use Authorization (EUA) assay can be found via   the Lab Guide.        Comment:      This test should be ordered for the detection of SARS-CoV-2 in individuals who   meet SARS-CoV-2 clinical and/or epidemiological criteria. Test performance is   unknown in asymptomatic patients.  This test is for in vitro diagnostic use under the FDA EUA for laboratories   certified under CLIA to perform high complexity testing. This test has not   been FDA cleared or approved.  A negative result does not rule out the presence of PCR inhibitors in the   specimen or target RNA in concentration below the limit of detection for the   assay. The possibility of a false negative should be considered if the   patient's recent exposure or clinical presentation suggests COVID-19.  This test was validated by the [facility]   [facility]. This laboratory is certified under the Clinical   Laboratory Improvement Amendments of 1988 (CLIA-88) as qualified to perform   high complexity laboratory testing.         If you have any questions or concerns, please call the clinic at the  number listed above.       Sincerely,  CIELO Ward CNP

## 2021-05-06 NOTE — PROGRESS NOTES
Assessment & Plan     Chronic neck pain  -patient tried physical therapy without improvement  -recommended TENS unit, patient will think about  -she tried Gabapentin and Flexeril without relief, recommended to start Celebrex and follow up with pain clinic to discuss other options for treatment    - PAIN MANAGEMENT REFERRAL; Future  - celecoxib (CELEBREX) 100 MG capsule; Take 1 capsule (100 mg) by mouth 2 times daily as needed for moderate pain    Sinus congestion  -recommended symptomatic treatment, Flonase, nasal saline irrigations over the counter   -she had mild cough, sinus congestion, states her son had cold, her symptoms improving and patient needs Covid test so she can return to work   - Symptomatic COVID-19 Virus (Coronavirus) by PCR    KERRY (generalized anxiety disorder)  -increased Prozac to 30 mg daily  -follow up in 4-6 weeks if still no improvement   - FLUoxetine 20 MG tablet; Take 1.5 tablets (30 mg) by mouth daily      Depression Screening Follow Up    PHQ 5/6/2021   PHQ-9 Total Score 14   Q9: Thoughts of better off dead/self-harm past 2 weeks Not at all       CIELO Li [facility]    Nadine Sanchez is a 30 year old who presents for the following health issues     Chief Complaint   Patient presents with     Cough     Recheck Medication     Vistaril and Gabapentin- states they were not helpful          HPI     Anxiety Follow-Up    How are you doing with your anxiety since your last visit? Worsened- has been out of work for one week due to a cough, has been having neck pain so she isnt sleeping     Are you having other symptoms that might be associated with anxiety? no    Have you had a significant life event? Transportation Concerns, stressed about looking for a house     Are you feeling depressed? No    Do you have any concerns with your use of alcohol or other drugs? No    Social History     Tobacco Use     Smoking status: Never Smoker     Smokeless  "tobacco: Never Used   Substance Use Topics     Alcohol use: Yes     Alcohol/week: 0.0 standard drinks     Comment: very little     Drug use: No     KERRY-7 SCORE 5/7/2020 9/24/2020 5/6/2021   Total Score - - -   Total Score 18 12 21     PHQ 5/7/2020 9/24/2020 5/6/2021   PHQ-9 Total Score 13 17 14   Q9: Thoughts of better off dead/self-harm past 2 weeks Not at all Not at all Not at all       Neck Pain      Duration: YEARS     Description:   Location: back of neck   Radiation: into both of her shoulders     Intensity:  Moderate to Severe     Accompanying signs and symptoms: injury happened at an old job     History (similar episodes/previous evaluation): YES, has been seen for this before, was seen in the ER on 1/19.     Precipitating or alleviating factors: None    Therapies tried and outcome: Gabapentin, muscle relaxers - not helpful        Acute Illness  Acute illness concerns: Allergies, cough   Onset/Duration:   Symptoms:  Fever: no  Chills/Sweats: no  Headache (location?): YES  Sinus Pressure: no  Conjunctivitis:  no  Ear Pain: no  Rhinorrhea: no  Congestion: YES- nasal   Sore Throat: no  Cough: YES-dry and productive   Wheeze: no  Decreased Appetite: no  Nausea: no  Vomiting: no  Diarrhea: no  Dysuria/Freq.: no  Dysuria or Hematuria: no  Fatigue/Achiness: no  Sick/Strep Exposure: son had a cold   Therapies tried and outcome: tylenol, advil, tea     Review of Systems   Constitutional, HEENT, cardiovascular, pulmonary, gi and gu systems are negative, except as otherwise noted.      Objective    /72 (BP Location: Right arm, Patient Position: Sitting, Cuff Size: Adult Regular)   Pulse 102   Temp 97.7  F (36.5  C) (Tympanic)   Resp 16   Ht 1.6 m (5' 3\")   Wt 77.6 kg (171 lb)   LMP 07/11/2020   SpO2 98%   BMI 30.29 kg/m    Body mass index is 30.29 kg/m .  Physical Exam   GENERAL: healthy, alert and no distress  EYES: Eyes grossly normal to inspection, PERRL and conjunctivae and sclerae normal  NECK: no " adenopathy, no asymmetry, masses, or scars and thyroid normal to palpation  RESP: lungs clear to auscultation - no rales, rhonchi or wheezes  CV: regular rate and rhythm, normal S1 S2, no S3 or S4, no murmur, click or rub, no peripheral edema and peripheral pulses strong  MS: no gross musculoskeletal defects noted, no edema  NEURO: Normal strength and tone, mentation intact and speech normal  PSYCH: mentation appears normal, affect normal/bright

## 2021-05-07 ASSESSMENT — ANXIETY QUESTIONNAIRES: GAD7 TOTAL SCORE: 21

## 2021-05-07 ASSESSMENT — ASTHMA QUESTIONNAIRES: ACT_TOTALSCORE: 25

## 2021-05-11 NOTE — TELEPHONE ENCOUNTER
Central Prior Authorization Team  Phone: [phone]    PA Initiation    Medication: fluoxetine  Insurance Company: [employer] - Phone [phone] Fax [phone]  Pharmacy Filling the Rx: [facility] [address] [place] [address] - [address]  Filling Pharmacy Phone: [phone]  Filling Pharmacy Fax:    Start Date: 5/11/2021

## 2021-05-11 NOTE — TELEPHONE ENCOUNTER
Prior Authorization Approval    Authorization Effective Date: 2/11/2021  Authorization Expiration Date: 5/11/2022  Medication: fluoxetine- APPROVED   Approved Dose/Quantity:   Reference #:     Insurance Company: [employer] - Phone [phone] Fax [phone]  Expected CoPay:       CoPay Card Available:      Foundation Assistance Needed:    Which Pharmacy is filling the prescription (Not needed for infusion/clinic administered): [facility] [address] [place] [address] - [facility] [address]  Pharmacy Notified: Yes  Patient Notified:  **Instructed pharmacy to notify patient when script is ready to /ship.**

## 2021-09-18 ENCOUNTER — HEALTH MAINTENANCE LETTER (OUTPATIENT)
Age: 31
End: 2021-09-18

## 2022-01-08 ENCOUNTER — HEALTH MAINTENANCE LETTER (OUTPATIENT)
Age: 32
End: 2022-01-08

## 2022-09-15 NOTE — TELEPHONE ENCOUNTER
"Requested Prescriptions   Pending Prescriptions Disp Refills     hydrOXYzine (VISTARIL) 25 MG capsule 30 capsule 0       Antihistamines Protocol Passed - 3/23/2021  2:06 PM        Passed - Recent (12 mo) or future (30 days) visit within the authorizing provider's specialty     Patient has had an office visit with the authorizing provider or a provider within the authorizing providers department within the previous 12 mos or has a future within next 30 days. See \"Patient Info\" tab in inbasket, or \"Choose Columns\" in Meds & Orders section of the refill encounter.              Passed - Patient is age 3 or older     Apply age and/or weight-based dosing for peds patients age 3 and older.    Forward request to provider for patients under the age of 3.          Passed - Medication is active on med list             "
Patient expressed no known problems or needs

## 2022-11-19 ENCOUNTER — HEALTH MAINTENANCE LETTER (OUTPATIENT)
Age: 32
End: 2022-11-19

## 2022-12-08 ENCOUNTER — MEDICAL CORRESPONDENCE (OUTPATIENT)
Dept: HEALTH INFORMATION MANAGEMENT | Facility: CLINIC | Age: 32
End: 2022-12-08

## 2022-12-09 ENCOUNTER — TRANSCRIBE ORDERS (OUTPATIENT)
Dept: OTHER | Age: 32
End: 2022-12-09

## 2022-12-09 DIAGNOSIS — D22.9 NEVUS SEBACEOUS: Primary | ICD-10-CM

## 2023-03-30 NOTE — TELEPHONE ENCOUNTER
Chest pocket flushed with 0.9 normal saline 250 ml in 500.000 poly myxin Unable to reach patient or leave msg.  1st attempt, call picked up and dropped.  2nd attempt went right to  which has not been set up yet.    Tiff MUNOZ RN

## 2023-04-09 ENCOUNTER — HEALTH MAINTENANCE LETTER (OUTPATIENT)
Age: 33
End: 2023-04-09

## (undated) DEVICE — GOWN LG DISP 9515

## (undated) DEVICE — GLOVE PROTEXIS MICRO 5.5  2D73PM55

## (undated) DEVICE — DECANTER VIAL 2006S

## (undated) DEVICE — SPONGE PACK VAGINAL 4X36"

## (undated) DEVICE — CATH TRAY FOLEY SURESTEP 16FR W/URINE MTR STATLK LF A303416A

## (undated) DEVICE — SOL WATER IRRIG 1000ML BOTTLE 07139-09

## (undated) DEVICE — SOL NACL 0.9% INJ 1000ML BAG 07983-09

## (undated) DEVICE — ESU LIGASURE IMPACT OPEN SEALER/DVDR CVD LG JAW LF4418

## (undated) DEVICE — PAD PERI INDIV WRAP 11" 2022

## (undated) DEVICE — SU VICRYL 0 CT-2 CR 8X18" J727D

## (undated) DEVICE — PACK LAPAROSCOPY/PELVISCOPY STD

## (undated) DEVICE — GLOVE PROTEXIS BLUE W/NEU-THERA 6.0  2D73EB60

## (undated) DEVICE — SU VICRYL 0 CT-1 36" J946H

## (undated) DEVICE — SOL NACL 0.9% IRRIG 1000ML BOTTLE 07138-09

## (undated) DEVICE — STOCKING SLEEVE COMPRESSION CALF MED

## (undated) DEVICE — TUBING SUCTION MEDI-VAC 1/4"X20' N620A

## (undated) DEVICE — SUCTION TIP YANKAUER STR K87

## (undated) DEVICE — BLADE KNIFE SURG 10 371110

## (undated) RX ORDER — PROPOFOL 10 MG/ML
INJECTION, EMULSION INTRAVENOUS
Status: DISPENSED
Start: 2020-07-22

## (undated) RX ORDER — LIDOCAINE HYDROCHLORIDE 10 MG/ML
INJECTION, SOLUTION INFILTRATION; PERINEURAL
Status: DISPENSED
Start: 2020-07-22

## (undated) RX ORDER — ONDANSETRON 2 MG/ML
INJECTION INTRAMUSCULAR; INTRAVENOUS
Status: DISPENSED
Start: 2020-07-22

## (undated) RX ORDER — OXYCODONE HYDROCHLORIDE 5 MG/1
TABLET ORAL
Status: DISPENSED
Start: 2020-07-22

## (undated) RX ORDER — HYDROMORPHONE HYDROCHLORIDE 1 MG/ML
INJECTION, SOLUTION INTRAMUSCULAR; INTRAVENOUS; SUBCUTANEOUS
Status: DISPENSED
Start: 2020-07-22

## (undated) RX ORDER — HYDROXYZINE HYDROCHLORIDE 25 MG/1
TABLET, FILM COATED ORAL
Status: DISPENSED
Start: 2020-07-22

## (undated) RX ORDER — KETOROLAC TROMETHAMINE 30 MG/ML
INJECTION, SOLUTION INTRAMUSCULAR; INTRAVENOUS
Status: DISPENSED
Start: 2020-07-22

## (undated) RX ORDER — LIDOCAINE HYDROCHLORIDE 10 MG/ML
INJECTION, SOLUTION EPIDURAL; INFILTRATION; INTRACAUDAL; PERINEURAL
Status: DISPENSED
Start: 2020-07-22

## (undated) RX ORDER — FENTANYL CITRATE 50 UG/ML
INJECTION, SOLUTION INTRAMUSCULAR; INTRAVENOUS
Status: DISPENSED
Start: 2020-07-22

## (undated) RX ORDER — DEXAMETHASONE SODIUM PHOSPHATE 4 MG/ML
INJECTION, SOLUTION INTRA-ARTICULAR; INTRALESIONAL; INTRAMUSCULAR; INTRAVENOUS; SOFT TISSUE
Status: DISPENSED
Start: 2020-07-22

## (undated) RX ORDER — PHENAZOPYRIDINE HYDROCHLORIDE 200 MG/1
TABLET, FILM COATED ORAL
Status: DISPENSED
Start: 2020-07-22

## (undated) RX ORDER — ACETAMINOPHEN 325 MG/1
TABLET ORAL
Status: DISPENSED
Start: 2020-07-22

## (undated) RX ORDER — CEFAZOLIN SODIUM 2 G/100ML
INJECTION, SOLUTION INTRAVENOUS
Status: DISPENSED
Start: 2020-07-22